# Patient Record
Sex: FEMALE | Race: WHITE | Employment: UNEMPLOYED | URBAN - NONMETROPOLITAN AREA
[De-identification: names, ages, dates, MRNs, and addresses within clinical notes are randomized per-mention and may not be internally consistent; named-entity substitution may affect disease eponyms.]

---

## 2017-01-05 ENCOUNTER — OFFICE VISIT (OUTPATIENT)
Dept: NEUROLOGY | Age: 67
End: 2017-01-05
Payer: COMMERCIAL

## 2017-01-05 VITALS
HEART RATE: 95 BPM | OXYGEN SATURATION: 92 % | DIASTOLIC BLOOD PRESSURE: 68 MMHG | BODY MASS INDEX: 27.82 KG/M2 | SYSTOLIC BLOOD PRESSURE: 111 MMHG | HEIGHT: 63 IN | WEIGHT: 157 LBS

## 2017-01-05 DIAGNOSIS — G61.0 GUILLAIN BARRÉ SYNDROME (HCC): Primary | ICD-10-CM

## 2017-01-05 DIAGNOSIS — M79.642 PAIN IN BOTH HANDS: ICD-10-CM

## 2017-01-05 DIAGNOSIS — R26.9 GAIT ABNORMALITY: ICD-10-CM

## 2017-01-05 DIAGNOSIS — R20.0 NUMBNESS: ICD-10-CM

## 2017-01-05 DIAGNOSIS — R53.1 WEAKNESS: ICD-10-CM

## 2017-01-05 DIAGNOSIS — M79.641 PAIN IN BOTH HANDS: ICD-10-CM

## 2017-01-05 PROCEDURE — 99214 OFFICE O/P EST MOD 30 MIN: CPT | Performed by: PSYCHIATRY & NEUROLOGY

## 2017-01-05 RX ORDER — HYDROCODONE BITARTRATE AND ACETAMINOPHEN 5; 325 MG/1; MG/1
1 TABLET ORAL EVERY 6 HOURS PRN
Qty: 60 TABLET | Refills: 0 | Status: SHIPPED | OUTPATIENT
Start: 2017-01-05 | End: 2017-02-04

## 2017-04-05 ENCOUNTER — OFFICE VISIT (OUTPATIENT)
Dept: NEUROLOGY | Age: 67
End: 2017-04-05
Payer: COMMERCIAL

## 2017-04-05 VITALS
SYSTOLIC BLOOD PRESSURE: 136 MMHG | DIASTOLIC BLOOD PRESSURE: 84 MMHG | RESPIRATION RATE: 16 BRPM | HEART RATE: 112 BPM | WEIGHT: 170 LBS | HEIGHT: 63 IN | BODY MASS INDEX: 30.12 KG/M2

## 2017-04-05 DIAGNOSIS — M79.641 PAIN IN BOTH HANDS: ICD-10-CM

## 2017-04-05 DIAGNOSIS — R20.0 NUMBNESS: ICD-10-CM

## 2017-04-05 DIAGNOSIS — G61.0 GUILLAIN BARRÉ SYNDROME (HCC): Primary | ICD-10-CM

## 2017-04-05 DIAGNOSIS — R53.1 WEAKNESS: ICD-10-CM

## 2017-04-05 DIAGNOSIS — M79.642 PAIN IN BOTH HANDS: ICD-10-CM

## 2017-04-05 DIAGNOSIS — R26.9 GAIT ABNORMALITY: ICD-10-CM

## 2017-04-05 PROCEDURE — 99214 OFFICE O/P EST MOD 30 MIN: CPT | Performed by: PSYCHIATRY & NEUROLOGY

## 2017-04-05 RX ORDER — GABAPENTIN 300 MG/1
300 CAPSULE ORAL 3 TIMES DAILY
Qty: 270 CAPSULE | Refills: 3 | Status: SHIPPED | OUTPATIENT
Start: 2017-04-05 | End: 2017-08-22

## 2017-06-28 ENCOUNTER — TELEPHONE (OUTPATIENT)
Dept: NEUROLOGY | Age: 67
End: 2017-06-28

## 2017-08-22 ENCOUNTER — OFFICE VISIT (OUTPATIENT)
Dept: NEUROLOGY | Age: 67
End: 2017-08-22
Payer: MEDICARE

## 2017-08-22 VITALS
HEIGHT: 63 IN | WEIGHT: 170 LBS | HEART RATE: 107 BPM | OXYGEN SATURATION: 92 % | SYSTOLIC BLOOD PRESSURE: 133 MMHG | DIASTOLIC BLOOD PRESSURE: 83 MMHG | BODY MASS INDEX: 30.12 KG/M2

## 2017-08-22 DIAGNOSIS — R26.9 GAIT ABNORMALITY: ICD-10-CM

## 2017-08-22 DIAGNOSIS — R20.0 NUMBNESS: ICD-10-CM

## 2017-08-22 DIAGNOSIS — M79.641 PAIN IN BOTH HANDS: ICD-10-CM

## 2017-08-22 DIAGNOSIS — R53.1 WEAKNESS: ICD-10-CM

## 2017-08-22 DIAGNOSIS — G61.0 GUILLAIN BARRÉ SYNDROME (HCC): Primary | ICD-10-CM

## 2017-08-22 DIAGNOSIS — M79.642 PAIN IN BOTH HANDS: ICD-10-CM

## 2017-08-22 PROCEDURE — 99214 OFFICE O/P EST MOD 30 MIN: CPT | Performed by: PSYCHIATRY & NEUROLOGY

## 2018-08-14 LAB
ALBUMIN SERPL-MCNC: 4.5 G/DL (ref 3.5–5.2)
ALP BLD-CCNC: 59 U/L (ref 35–104)
ALT SERPL-CCNC: 13 U/L (ref 5–33)
ANION GAP SERPL CALCULATED.3IONS-SCNC: 17 MMOL/L (ref 7–19)
AST SERPL-CCNC: 20 U/L (ref 5–32)
BASOPHILS ABSOLUTE: 0.1 K/UL (ref 0–0.2)
BASOPHILS RELATIVE PERCENT: 0.6 % (ref 0–1)
BILIRUB SERPL-MCNC: 0.4 MG/DL (ref 0.2–1.2)
BUN BLDV-MCNC: 15 MG/DL (ref 8–23)
CALCIUM SERPL-MCNC: 9.3 MG/DL (ref 8.8–10.2)
CHLORIDE BLD-SCNC: 99 MMOL/L (ref 98–111)
CHOLESTEROL, TOTAL: 231 MG/DL (ref 160–199)
CO2: 26 MMOL/L (ref 22–29)
CREAT SERPL-MCNC: <0.5 MG/DL (ref 0.5–0.9)
EOSINOPHILS ABSOLUTE: 0.2 K/UL (ref 0–0.6)
EOSINOPHILS RELATIVE PERCENT: 2.7 % (ref 0–5)
GFR NON-AFRICAN AMERICAN: >60
GLUCOSE BLD-MCNC: 100 MG/DL (ref 74–109)
HCT VFR BLD CALC: 42.2 % (ref 37–47)
HDLC SERPL-MCNC: 130 MG/DL (ref 65–121)
HEMOGLOBIN: 13.5 G/DL (ref 12–16)
LDL CHOLESTEROL CALCULATED: 90 MG/DL
LYMPHOCYTES ABSOLUTE: 1.7 K/UL (ref 1.1–4.5)
LYMPHOCYTES RELATIVE PERCENT: 18.5 % (ref 20–40)
MCH RBC QN AUTO: 31.8 PG (ref 27–31)
MCHC RBC AUTO-ENTMCNC: 32 G/DL (ref 33–37)
MCV RBC AUTO: 99.3 FL (ref 81–99)
MONOCYTES ABSOLUTE: 0.7 K/UL (ref 0–0.9)
MONOCYTES RELATIVE PERCENT: 7.3 % (ref 0–10)
NEUTROPHILS ABSOLUTE: 6.3 K/UL (ref 1.5–7.5)
NEUTROPHILS RELATIVE PERCENT: 70.5 % (ref 50–65)
PDW BLD-RTO: 14.1 % (ref 11.5–14.5)
PLATELET # BLD: 237 K/UL (ref 130–400)
PMV BLD AUTO: 10.8 FL (ref 9.4–12.3)
POTASSIUM SERPL-SCNC: 4.1 MMOL/L (ref 3.5–5)
RBC # BLD: 4.25 M/UL (ref 4.2–5.4)
SODIUM BLD-SCNC: 142 MMOL/L (ref 136–145)
TOTAL PROTEIN: 7.7 G/DL (ref 6.6–8.7)
TRIGL SERPL-MCNC: 55 MG/DL (ref 0–149)
TSH SERPL DL<=0.05 MIU/L-ACNC: 1.24 UIU/ML (ref 0.27–4.2)
WBC # BLD: 9 K/UL (ref 4.8–10.8)

## 2018-12-27 ENCOUNTER — OFFICE VISIT (OUTPATIENT)
Dept: PULMONOLOGY | Facility: CLINIC | Age: 68
End: 2018-12-27

## 2018-12-27 VITALS
WEIGHT: 177 LBS | RESPIRATION RATE: 16 BRPM | OXYGEN SATURATION: 94 % | SYSTOLIC BLOOD PRESSURE: 130 MMHG | HEIGHT: 63 IN | BODY MASS INDEX: 31.36 KG/M2 | DIASTOLIC BLOOD PRESSURE: 80 MMHG | HEART RATE: 76 BPM

## 2018-12-27 DIAGNOSIS — J44.9 COPD, VERY SEVERE (HCC): Primary | ICD-10-CM

## 2018-12-27 DIAGNOSIS — J84.10 PULMONARY FIBROSIS, UNSPECIFIED (HCC): ICD-10-CM

## 2018-12-27 DIAGNOSIS — J96.11 CHRONIC RESPIRATORY FAILURE WITH HYPOXIA (HCC): ICD-10-CM

## 2018-12-27 PROCEDURE — 99213 OFFICE O/P EST LOW 20 MIN: CPT | Performed by: INTERNAL MEDICINE

## 2018-12-27 RX ORDER — ACETAMINOPHEN 500 MG
500 TABLET ORAL EVERY 6 HOURS PRN
COMMUNITY

## 2018-12-27 RX ORDER — ALBUTEROL SULFATE 2.5 MG/3ML
2.5 SOLUTION RESPIRATORY (INHALATION)
Status: ON HOLD | COMMUNITY
End: 2019-07-05

## 2018-12-27 RX ORDER — BUDESONIDE AND FORMOTEROL FUMARATE DIHYDRATE 160; 4.5 UG/1; UG/1
2 AEROSOL RESPIRATORY (INHALATION)
COMMUNITY
End: 2019-12-27

## 2018-12-27 NOTE — PROGRESS NOTES
"Subjective   Joana Cuevas is a 68 y.o. female.     Chief Complaint   Patient presents with   • COPD      Pt with copd , pulmonary fibrosis, chronic respiratory failure with hypoxia, RBILD.  FEV1 32% predicted 2017 with normal dlco.  She has had prior admission with severe acute respiratory failure.  Hypoxic response to exercise with tachycardia on 6MWT.  Prior hx interstitial and nodular infiltrates 2016.    History of Present Illness   She is using the oxygen around the clock except when by open flame.  She is benefiting from it.  No exacerbations, no acute complaints.  She has mild intermittent dyspnea in chest on exertion.    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   has a past surgical history that includes Hysterectomy; Mastectomy; and Tonsilectomy, adenoidectomy, bilateral myringotomy and tubes.  family history includes Cancer in her mother; Diabetes in her mother; Heart failure in her father.   reports that she quit smoking about 3 years ago. Her smoking use included cigarettes. She has a 70.00 pack-year smoking history. she has never used smokeless tobacco.  No Known Allergies  Medications    Current Outpatient Medications:   •  acetaminophen (TYLENOL) 500 MG tablet, Take 500 mg by mouth., Disp: , Rfl:   •  albuterol (PROVENTIL) (2.5 MG/3ML) 0.083% nebulizer solution, 2.5 mg., Disp: , Rfl:   •  budesonide-formoterol (SYMBICORT) 160-4.5 MCG/ACT inhaler, Inhale., Disp: , Rfl:   •  O2 (OXYGEN), Inhale., Disp: , Rfl:   •  tiotropium (SPIRIVA) 18 MCG per inhalation capsule, Place  into inhaler and inhale., Disp: , Rfl:     Review of Systems   Constitutional: Negative for chills and fever.   Cardiovascular: Negative for chest pain.   Gastrointestinal: Negative for nausea and vomiting.     ------------------------------------  Objective   /80   Pulse 76   Resp 16   Ht 160 cm (63\")   Wt " 80.3 kg (177 lb)   SpO2 94% Comment: pd of 3  Breastfeeding? No   BMI 31.35 kg/m²   Physical Exam   Constitutional: She appears well-developed. She does not appear ill. No distress.   HENT:   Head: Atraumatic.   Nose: Nose normal.   Eyes: Conjunctivae and EOM are normal. No scleral icterus.   Neck: Neck supple.   Cardiovascular: Normal rate, regular rhythm, S1 normal and S2 normal.   Pulmonary/Chest: Effort normal and breath sounds normal. No stridor.   Abdominal: Soft.   Musculoskeletal: She exhibits no deformity.   Lymphadenopathy:     She has no cervical adenopathy.   Neurological: She is alert.   Skin: Skin is warm. No rash noted.   Psychiatric: She has a normal mood and affect.         ------------------------------------  Assessment/Plan   Joana was seen today for copd.    Diagnoses and all orders for this visit:    COPD, very severe (CMS/HCC)    Chronic respiratory failure with hypoxia (CMS/HCC)    Pulmonary fibrosis, unspecified (CMS/HCC)  -     XR Chest 2 View; Future      Patient's Body mass index is 31.35 kg/m². BMI is above normal parameters. Recommendations include: referral to primary care.      Pulmonary status stable with copd/chronic RF compensated and pulm fibrosis probably postinflammatory.  No changes in regimen today.

## 2019-01-21 ENCOUNTER — HOSPITAL ENCOUNTER (OUTPATIENT)
Dept: GENERAL RADIOLOGY | Facility: HOSPITAL | Age: 69
Discharge: HOME | End: 2019-01-21
Attending: INTERNAL MEDICINE | Admitting: INTERNAL MEDICINE

## 2019-01-21 DIAGNOSIS — J84.10 PULMONARY FIBROSIS, UNSPECIFIED (HCC): ICD-10-CM

## 2019-01-21 PROCEDURE — 71046 X-RAY EXAM CHEST 2 VIEWS: CPT

## 2019-01-22 DIAGNOSIS — J84.10 PULMONARY FIBROSIS (HCC): Primary | ICD-10-CM

## 2019-02-07 ENCOUNTER — TELEPHONE (OUTPATIENT)
Dept: PULMONOLOGY | Facility: CLINIC | Age: 69
End: 2019-02-07

## 2019-02-07 RX ORDER — CEFDINIR 300 MG/1
300 CAPSULE ORAL 2 TIMES DAILY
Qty: 14 CAPSULE | Refills: 0 | Status: SHIPPED | OUTPATIENT
Start: 2019-02-07 | End: 2019-02-14

## 2019-02-07 NOTE — TELEPHONE ENCOUNTER
Patient called to say she has started coughing up dark green sputum, chest congestion, no fever.  Patient is supposed to have a repeat cxr on 2/22/19.

## 2019-03-01 ENCOUNTER — HOSPITAL ENCOUNTER (OUTPATIENT)
Dept: GENERAL RADIOLOGY | Facility: HOSPITAL | Age: 69
Discharge: HOME | End: 2019-03-01
Admitting: INTERNAL MEDICINE

## 2019-03-01 DIAGNOSIS — R91.8 LUNG INFILTRATE: Primary | ICD-10-CM

## 2019-03-01 DIAGNOSIS — J84.10 PULMONARY FIBROSIS (HCC): ICD-10-CM

## 2019-03-01 PROCEDURE — 71046 X-RAY EXAM CHEST 2 VIEWS: CPT

## 2019-03-07 ENCOUNTER — APPOINTMENT (OUTPATIENT)
Dept: CT IMAGING | Facility: HOSPITAL | Age: 69
End: 2019-03-07

## 2019-03-08 ENCOUNTER — HOSPITAL ENCOUNTER (OUTPATIENT)
Dept: NEUROLOGY | Age: 69
Discharge: HOME | End: 2019-03-08
Payer: MEDICARE

## 2019-03-08 PROCEDURE — 95911 NRV CNDJ TEST 9-10 STUDIES: CPT | Performed by: PSYCHIATRY & NEUROLOGY

## 2019-03-08 PROCEDURE — 95886 MUSC TEST DONE W/N TEST COMP: CPT

## 2019-03-08 PROCEDURE — 95911 NRV CNDJ TEST 9-10 STUDIES: CPT

## 2019-03-08 PROCEDURE — 95886 MUSC TEST DONE W/N TEST COMP: CPT | Performed by: PSYCHIATRY & NEUROLOGY

## 2019-03-12 ENCOUNTER — HOSPITAL ENCOUNTER (OUTPATIENT)
Dept: CT IMAGING | Facility: HOSPITAL | Age: 69
Discharge: HOME | End: 2019-03-12
Admitting: INTERNAL MEDICINE

## 2019-03-12 DIAGNOSIS — R91.8 LUNG INFILTRATE: ICD-10-CM

## 2019-03-12 PROCEDURE — 71250 CT THORAX DX C-: CPT

## 2019-03-13 ENCOUNTER — TELEPHONE (OUTPATIENT)
Dept: PULMONOLOGY | Facility: CLINIC | Age: 69
End: 2019-03-13

## 2019-03-13 NOTE — TELEPHONE ENCOUNTER
Ar Kendrick MD  [facility]             Please call the patient regarding her abnormal result.  There is a nodule that we will need to recheck with a follow up ct in a few months.  Have her keep follow up and we will discuss further.

## 2019-03-13 NOTE — TELEPHONE ENCOUNTER
Called Daughter (Dex) of the results.    Daughter says that there is residual pneumonia and is still coughing up mucus that is dark green.    She is wanting to know if she needs antibiotics or steroids.

## 2019-03-14 DIAGNOSIS — R05.9 COUGH: Primary | ICD-10-CM

## 2019-03-14 RX ORDER — CEFDINIR 300 MG/1
300 CAPSULE ORAL 2 TIMES DAILY
Qty: 14 CAPSULE | Refills: 0 | Status: SHIPPED | OUTPATIENT
Start: 2019-03-14 | End: 2019-03-21

## 2019-04-02 ENCOUNTER — APPOINTMENT (OUTPATIENT)
Dept: CT IMAGING | Facility: HOSPITAL | Age: 69
End: 2019-04-02

## 2019-04-10 PROBLEM — J44.9 STAGE 4 VERY SEVERE COPD BY GOLD CLASSIFICATION: Status: ACTIVE | Noted: 2019-04-10

## 2019-04-17 ENCOUNTER — OFFICE VISIT (OUTPATIENT)
Dept: PULMONOLOGY | Facility: CLINIC | Age: 69
End: 2019-04-17

## 2019-04-17 VITALS
HEIGHT: 63 IN | DIASTOLIC BLOOD PRESSURE: 90 MMHG | SYSTOLIC BLOOD PRESSURE: 140 MMHG | OXYGEN SATURATION: 97 % | BODY MASS INDEX: 31.54 KG/M2 | HEART RATE: 109 BPM | WEIGHT: 178 LBS

## 2019-04-17 DIAGNOSIS — J44.9 STAGE 4 VERY SEVERE COPD BY GOLD CLASSIFICATION (HCC): Primary | ICD-10-CM

## 2019-04-17 DIAGNOSIS — R91.1 LUNG NODULE, SOLITARY: ICD-10-CM

## 2019-04-17 DIAGNOSIS — R05.9 COUGH: ICD-10-CM

## 2019-04-17 DIAGNOSIS — J84.10 PULMONARY FIBROSIS (HCC): ICD-10-CM

## 2019-04-17 DIAGNOSIS — J96.11 CHRONIC RESPIRATORY FAILURE WITH HYPOXIA (HCC): ICD-10-CM

## 2019-04-17 DIAGNOSIS — J84.115 RESPIRATORY BRONCHIOLITIS INTERSTITIAL LUNG DISEASE (HCC): ICD-10-CM

## 2019-04-17 LAB
DIFF CAP.CO: NORMAL ML/MMHG SEC
FEV1/FVC: NORMAL %
FEV1: NORMAL LITERS
FVC VOL RESPIRATORY: NORMAL LITERS

## 2019-04-17 PROCEDURE — 94010 BREATHING CAPACITY TEST: CPT | Performed by: INTERNAL MEDICINE

## 2019-04-17 PROCEDURE — 94729 DIFFUSING CAPACITY: CPT | Performed by: INTERNAL MEDICINE

## 2019-04-17 PROCEDURE — 99214 OFFICE O/P EST MOD 30 MIN: CPT | Performed by: INTERNAL MEDICINE

## 2019-04-17 RX ORDER — GLUCOSAMINE/D3/BOSWELLIA SERRA 1500MG-400
1000 TABLET ORAL DAILY
COMMUNITY

## 2019-04-17 NOTE — PROGRESS NOTES
Subjective   Joana Cuevas is a 68 y.o. female.     Background: Pt with copd , pulmonary fibrosis, chronic respiratory failure with hypoxia, RBILD.  FEV1 32% predicted 2017 with normal dlco.  She has had prior admission with severe acute respiratory failure.  Hypoxic response to exercise with tachycardia on 6MWT.  Prior hx interstitial and nodular infiltrates 2016    Chief Complaint   Patient presents with   • Shortness of Breath        History of Present Illness   She has chronic shortness of breath in chest on exertion alleviated by oxygen and alleviated with rest, associated with intermittent aggravating cough.  She has hx guillain barre syndrome and cant take flu shots.    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   has a past surgical history that includes Hysterectomy; Mastectomy; and Tonsilectomy, adenoidectomy, bilateral myringotomy and tubes.  family history includes Cancer in her mother; Diabetes in her mother; Heart failure in her father.   reports that she quit smoking about 4 years ago. Her smoking use included cigarettes. She has a 70.00 pack-year smoking history. She has never used smokeless tobacco. She reports that she does not use drugs.  No Known Allergies  Medications    Current Outpatient Medications:   •  acetaminophen (TYLENOL) 500 MG tablet, Take 500 mg by mouth., Disp: , Rfl:   •  albuterol (PROVENTIL) (2.5 MG/3ML) 0.083% nebulizer solution, 2.5 mg., Disp: , Rfl:   •  Biotin 16002 MCG tablet, Take  by mouth., Disp: , Rfl:   •  budesonide-formoterol (SYMBICORT) 160-4.5 MCG/ACT inhaler, Inhale., Disp: , Rfl:   •  MAGNESIUM PO, Take  by mouth., Disp: , Rfl:   •  O2 (OXYGEN), Inhale., Disp: , Rfl:   •  tiotropium (SPIRIVA) 18 MCG per inhalation capsule, Place  into inhaler and inhale., Disp: , Rfl:     Review of Systems   Constitutional: Negative for chills and fever.  "  Respiratory: Positive for shortness of breath. Negative for cough.    Cardiovascular: Negative for chest pain.   Gastrointestinal: Negative for nausea and vomiting.   Neurological: Positive for weakness.       Objective   /90   Pulse 109   Ht 160 cm (63\")   Wt 80.7 kg (178 lb)   SpO2 97% Comment: 3l  Breastfeeding? No   BMI 31.53 kg/m²   Physical Exam   Constitutional: She appears well-developed. She does not appear ill. No distress.   HENT:   Head: Atraumatic.   Nose: Nose normal.   Eyes: Conjunctivae and EOM are normal. No scleral icterus.   Neck: Neck supple.   Cardiovascular: Normal rate, regular rhythm, S1 normal and S2 normal.   Pulmonary/Chest: Effort normal. She has wheezes. She has rales.   Abdominal: Soft. She exhibits no distension. There is no tenderness.   Musculoskeletal: She exhibits no deformity.   Lymphadenopathy:     She has no cervical adenopathy.   Neurological: She is alert.   Skin: Skin is warm. No rash noted.   Psychiatric: She has a normal mood and affect.     -----------------------------------------------------------------------------------------------  Recent Imaging:  EXAMINATION: CT CHEST WO CONTRAST- 3/12/2019 3:29 PM CDT  HISTORY: Follow up infiltrate on cxr.; R91.8-Other nonspecific abnormal  finding of lung field. Remote history of breast cancer.  DOSE: 260 mGycm (Automatic exposure control technique was implemented in  an effort to keep the radiation dose as low as possible without  compromising image quality)  REPORT: Spiral CT of the chest was performed without intravenous  contrast from the thoracic inlet through the upper abdomen.  Reconstructed coronal and sagittal images are also reviewed.  Comparison: CT chest without contrast 12/13/2016.  Review of lung windows demonstrates hyperinflation of lungs compatible  with moderate diffuse centrilobular emphysema. There are coarse  peripheral predominantly reticular interstitial infiltrates in the left  lower lobe and " lingula that may represent residual pneumonia but no lung  masses identified. In the deep posterior costophrenic sulcus on image  129 there is a 9.8 mm pleural-based nodule which is noncalcified which  needs follow-up. There is mild interstitial thickening within the  anterior aspect of the right upper lobe, without consolidation. This  could be acute or chronic. No pneumothorax or pleural effusion is  identified. The airways are patent. Review of soft tissue windows  demonstrate some low-attenuation nodules in the thyroid gland, greater  on the right. One nodule right measures 1.4 cm and another measures  approximately 1.7 cm. Ultrasound of the thyroid gland is recommended.  There is evidence of previous bilateral mastectomies and axillary lymph  node dissection. No intrathoracic lymphadenopathy is identified. Heavy  coronary artery calcification is noted at the proximal LAD. Heart size  is normal. In the upper abdomen, there is increased attenuation  dependently within the gallbladder wall related to cholelithiasis or  sludge. No inflammation of the gallbladder is visualized. Review of bone  windows shows no acute findings.  IMPRESSION:  1. No lung mass is identified, there are coarse reticular interstitial  infiltrates in the left lower lobe and lingula which may represent  residual pneumonia. Some degree of interstitial fibrosis is also likely.  Follow-up is recommended for a 9.8 mm pleural-based nodule in the deep  left posterior costophrenic sulcus which is not calcified. Consider  repeat chest CT in 3 months.  2. No evidence of intrathoracic lymphadenopathy.   3. Previous bilateral mastectomies and axillary lymph node dissection.  4. Hyperattenuating dependent density in the gallbladder possibly  cholelithiasis or sludge.  5. 2 low-attenuation nodules in the right thyroid lobe measuring greater  than 1 cm for which thyroid ultrasound is recommended for  follow-up.  -----------------------------------------------------------------------------------------------  Pulmonary Functions Testing Results:  FEV1   Date Value Ref Range Status   04/17/2019 36% liters Final     FVC   Date Value Ref Range Status   04/17/2019 68% liters Final     FEV1/FVC   Date Value Ref Range Status   04/17/2019 42.15% % Final     DLCO   Date Value Ref Range Status   04/17/2019 42% ml/mmHg sec Final      My interpretation of PFT: severe airflow obstruction with reduced   -----------------------------------------------------------------------------------------------  Assessment/Plan   Problem List Items Addressed This Visit        Respiratory    Stage 4 very severe COPD by GOLD classification (CMS/[facility]) - Primary    Relevant Orders    Pulmonary Function Test (Completed)    Respiratory bronchiolitis interstitial lung disease (CMS/[facility])    Chronic respiratory failure with hypoxia (CMS/[facility])    Pulmonary fibrosis (CMS/[facility])    Cough      Other Visit Diagnoses     Lung nodule, solitary        Relevant Orders    CT Chest Without Contrast        Patient's Body mass index is 31.53 kg/m². BMI is above normal parameters. Recommendations include: referral to primary care.      She has had pneumonia and also has a lung nodule that needs follow up.  She is not a candidate for vaccines.  pft appears stable.  Cough is generally better since smoking cessation, so rbild appears to be resolved.  Repeat ct in June.       Electronically signed by Ar Kendrick MD, 4/17/2019, 11:03 AM

## 2019-05-15 RX ORDER — TIOTROPIUM BROMIDE 18 UG/1
CAPSULE ORAL; RESPIRATORY (INHALATION)
Qty: 90 CAPSULE | Refills: 3 | Status: ON HOLD | OUTPATIENT
Start: 2019-05-15 | End: 2019-07-05

## 2019-07-05 ENCOUNTER — APPOINTMENT (OUTPATIENT)
Dept: MRI IMAGING | Facility: HOSPITAL | Age: 69
End: 2019-07-05

## 2019-07-05 ENCOUNTER — HOSPITAL ENCOUNTER (OUTPATIENT)
Facility: HOSPITAL | Age: 69
Setting detail: OBSERVATION
Discharge: HOME | End: 2019-07-06
Attending: EMERGENCY MEDICINE | Admitting: INTERNAL MEDICINE

## 2019-07-05 ENCOUNTER — APPOINTMENT (OUTPATIENT)
Dept: CARDIOLOGY | Facility: HOSPITAL | Age: 69
End: 2019-07-05

## 2019-07-05 ENCOUNTER — APPOINTMENT (OUTPATIENT)
Dept: CT IMAGING | Facility: HOSPITAL | Age: 69
End: 2019-07-05

## 2019-07-05 ENCOUNTER — APPOINTMENT (OUTPATIENT)
Dept: ULTRASOUND IMAGING | Facility: HOSPITAL | Age: 69
End: 2019-07-05

## 2019-07-05 DIAGNOSIS — S01.01XA LACERATION OF SCALP, INITIAL ENCOUNTER: ICD-10-CM

## 2019-07-05 DIAGNOSIS — R55 SYNCOPE, UNSPECIFIED SYNCOPE TYPE: Primary | ICD-10-CM

## 2019-07-05 PROBLEM — S01.91XA LACERATION OF HEAD: Status: ACTIVE | Noted: 2019-07-05

## 2019-07-05 LAB
ABO GROUP BLD: NORMAL
ALBUMIN SERPL-MCNC: 4.3 G/DL (ref 3.5–5)
ALBUMIN/GLOB SERPL: 1.4 G/DL (ref 1.1–2.5)
ALP SERPL-CCNC: 46 U/L (ref 24–120)
ALT SERPL W P-5'-P-CCNC: 20 U/L (ref 0–54)
ANION GAP SERPL CALCULATED.3IONS-SCNC: 12 MMOL/L (ref 4–13)
APTT PPP: 30 SECONDS (ref 24.1–35)
ARTICHOKE IGE QN: 64 MG/DL (ref 0–99)
AST SERPL-CCNC: 49 U/L (ref 7–45)
BASOPHILS # BLD AUTO: 0.08 10*3/MM3 (ref 0–0.2)
BASOPHILS NFR BLD AUTO: 0.8 % (ref 0–2)
BH CV ECHO MEAS - AO MAX PG (FULL): 4.1 MMHG
BH CV ECHO MEAS - AO MAX PG: 8.6 MMHG
BH CV ECHO MEAS - AO MEAN PG (FULL): 1 MMHG
BH CV ECHO MEAS - AO MEAN PG: 3 MMHG
BH CV ECHO MEAS - AO ROOT AREA (BSA CORRECTED): 1.7
BH CV ECHO MEAS - AO ROOT AREA: 7.1 CM^2
BH CV ECHO MEAS - AO ROOT DIAM: 3 CM
BH CV ECHO MEAS - AO V2 MAX: 147 CM/SEC
BH CV ECHO MEAS - AO V2 MEAN: 73.7 CM/SEC
BH CV ECHO MEAS - AO V2 VTI: 18.5 CM
BH CV ECHO MEAS - AVA(I,A): 2.9 CM^2
BH CV ECHO MEAS - AVA(I,D): 2.9 CM^2
BH CV ECHO MEAS - AVA(V,A): 2.3 CM^2
BH CV ECHO MEAS - AVA(V,D): 2.3 CM^2
BH CV ECHO MEAS - BSA(HAYCOCK): 1.9 M^2
BH CV ECHO MEAS - BSA: 1.8 M^2
BH CV ECHO MEAS - BZI_BMI: 30.3 KILOGRAMS/M^2
BH CV ECHO MEAS - BZI_METRIC_HEIGHT: 160 CM
BH CV ECHO MEAS - BZI_METRIC_WEIGHT: 77.6 KG
BH CV ECHO MEAS - EDV(CUBED): 89.3 ML
BH CV ECHO MEAS - EDV(MOD-SP4): 104 ML
BH CV ECHO MEAS - EDV(TEICH): 91 ML
BH CV ECHO MEAS - EF(CUBED): 63 %
BH CV ECHO MEAS - EF(MOD-SP4): 51.8 %
BH CV ECHO MEAS - EF(TEICH): 54.6 %
BH CV ECHO MEAS - ESV(CUBED): 33.1 ML
BH CV ECHO MEAS - ESV(MOD-SP4): 50.1 ML
BH CV ECHO MEAS - ESV(TEICH): 41.3 ML
BH CV ECHO MEAS - FS: 28.2 %
BH CV ECHO MEAS - IVS/LVPW: 0.94
BH CV ECHO MEAS - IVSD: 0.75 CM
BH CV ECHO MEAS - LA DIMENSION: 2.9 CM
BH CV ECHO MEAS - LA/AO: 0.97
BH CV ECHO MEAS - LAT PEAK E' VEL: 7.7 CM/SEC
BH CV ECHO MEAS - LV DIASTOLIC VOL/BSA (35-75): 57.5 ML/M^2
BH CV ECHO MEAS - LV MASS(C)D: 106.7 GRAMS
BH CV ECHO MEAS - LV MASS(C)DI: 59 GRAMS/M^2
BH CV ECHO MEAS - LV MAX PG: 4.6 MMHG
BH CV ECHO MEAS - LV MEAN PG: 2 MMHG
BH CV ECHO MEAS - LV SYSTOLIC VOL/BSA (12-30): 27.7 ML/M^2
BH CV ECHO MEAS - LV V1 MAX: 107 CM/SEC
BH CV ECHO MEAS - LV V1 MEAN: 64.4 CM/SEC
BH CV ECHO MEAS - LV V1 VTI: 16.9 CM
BH CV ECHO MEAS - LVIDD: 4.5 CM
BH CV ECHO MEAS - LVIDS: 3.2 CM
BH CV ECHO MEAS - LVLD AP4: 8 CM
BH CV ECHO MEAS - LVLS AP4: 6.7 CM
BH CV ECHO MEAS - LVOT AREA (M): 3.1 CM^2
BH CV ECHO MEAS - LVOT AREA: 3.1 CM^2
BH CV ECHO MEAS - LVOT DIAM: 2 CM
BH CV ECHO MEAS - LVPWD: 0.79 CM
BH CV ECHO MEAS - MED PEAK E' VEL: 8.92 CM/SEC
BH CV ECHO MEAS - MV DEC TIME: 0.19 SEC
BH CV ECHO MEAS - MV E MAX VEL: 63 CM/SEC
BH CV ECHO MEAS - RAP SYSTOLE: 5 MMHG
BH CV ECHO MEAS - RVSP: 10.5 MMHG
BH CV ECHO MEAS - SI(AO): 72.3 ML/M^2
BH CV ECHO MEAS - SI(CUBED): 31.1 ML/M^2
BH CV ECHO MEAS - SI(LVOT): 29.3 ML/M^2
BH CV ECHO MEAS - SI(MOD-SP4): 29.8 ML/M^2
BH CV ECHO MEAS - SI(TEICH): 27.5 ML/M^2
BH CV ECHO MEAS - SV(AO): 130.8 ML
BH CV ECHO MEAS - SV(CUBED): 56.2 ML
BH CV ECHO MEAS - SV(LVOT): 53.1 ML
BH CV ECHO MEAS - SV(MOD-SP4): 53.9 ML
BH CV ECHO MEAS - SV(TEICH): 49.7 ML
BH CV ECHO MEAS - TR MAX VEL: 117 CM/SEC
BH CV ECHO MEASUREMENTS AVERAGE E/E' RATIO: 7.58
BILIRUB SERPL-MCNC: 0.4 MG/DL (ref 0.1–1)
BLD GP AB SCN SERPL QL: NEGATIVE
BUN BLD-MCNC: 8 MG/DL (ref 5–21)
BUN/CREAT SERPL: 21.6 (ref 7–25)
CALCIUM SPEC-SCNC: 8.9 MG/DL (ref 8.4–10.4)
CHLORIDE SERPL-SCNC: 94 MMOL/L (ref 98–110)
CHOLEST SERPL-MCNC: 172 MG/DL (ref 130–200)
CO2 SERPL-SCNC: 30 MMOL/L (ref 24–31)
CREAT BLD-MCNC: 0.37 MG/DL (ref 0.5–1.4)
CRP SERPL-MCNC: 0.51 MG/DL (ref 0–0.99)
DEPRECATED RDW RBC AUTO: 48.5 FL (ref 40–54)
EOSINOPHIL # BLD AUTO: 0.57 10*3/MM3 (ref 0–0.7)
EOSINOPHIL NFR BLD AUTO: 5.9 % (ref 0–4)
ERYTHROCYTE [DISTWIDTH] IN BLOOD BY AUTOMATED COUNT: 13.4 % (ref 12–15)
ERYTHROCYTE [SEDIMENTATION RATE] IN BLOOD: 11 MM/HR (ref 0–20)
FOLATE SERPL-MCNC: 9.9 NG/ML (ref 4.78–24.2)
GFR SERPL CREATININE-BSD FRML MDRD: >150 ML/MIN/1.73
GLOBULIN UR ELPH-MCNC: 3.1 GM/DL
GLUCOSE BLD-MCNC: 116 MG/DL (ref 70–100)
HCT VFR BLD AUTO: 37.1 % (ref 37–47)
HDLC SERPL-MCNC: 93 MG/DL
HGB BLD-MCNC: 12.4 G/DL (ref 12–16)
HOLD SPECIMEN: NORMAL
HOLD SPECIMEN: NORMAL
IMM GRANULOCYTES # BLD AUTO: 0.02 10*3/MM3 (ref 0–0.05)
IMM GRANULOCYTES NFR BLD AUTO: 0.2 % (ref 0–5)
INR PPP: 0.86 (ref 0.91–1.09)
LDLC/HDLC SERPL: 0.68 {RATIO}
LEFT ATRIUM VOLUME INDEX: 16.9 ML/M2
LEFT ATRIUM VOLUME: 30.6 CM3
LV EF 2D ECHO EST: 55 %
LYMPHOCYTES # BLD AUTO: 2.52 10*3/MM3 (ref 0.72–4.86)
LYMPHOCYTES NFR BLD AUTO: 26.3 % (ref 15–45)
MAXIMAL PREDICTED HEART RATE: 154 BPM
MCH RBC QN AUTO: 32.5 PG (ref 28–32)
MCHC RBC AUTO-ENTMCNC: 33.4 G/DL (ref 33–36)
MCV RBC AUTO: 97.1 FL (ref 82–98)
MONOCYTES # BLD AUTO: 0.85 10*3/MM3 (ref 0.19–1.3)
MONOCYTES NFR BLD AUTO: 8.9 % (ref 4–12)
NEUTROPHILS # BLD AUTO: 5.54 10*3/MM3 (ref 1.87–8.4)
NEUTROPHILS NFR BLD AUTO: 57.9 % (ref 39–78)
NRBC BLD AUTO-RTO: 0 /100 WBC (ref 0–0.2)
NT-PROBNP SERPL-MCNC: 172 PG/ML (ref 0–900)
PLATELET # BLD AUTO: 254 10*3/MM3 (ref 130–400)
PMV BLD AUTO: 10.5 FL (ref 6–12)
POTASSIUM BLD-SCNC: 4.3 MMOL/L (ref 3.5–5.3)
PROT SERPL-MCNC: 7.4 G/DL (ref 6.3–8.7)
PROTHROMBIN TIME: 12 SECONDS (ref 11.9–14.6)
RBC # BLD AUTO: 3.82 10*6/MM3 (ref 4.2–5.4)
RH BLD: POSITIVE
SODIUM BLD-SCNC: 136 MMOL/L (ref 135–145)
STRESS TARGET HR: 131 BPM
T&S EXPIRATION DATE: NORMAL
TRIGL SERPL-MCNC: 80 MG/DL (ref 0–149)
TROPONIN I SERPL-MCNC: <0.012 NG/ML (ref 0–0.03)
VIT B12 BLD-MCNC: 576 PG/ML (ref 239–931)
WBC NRBC COR # BLD: 9.58 10*3/MM3 (ref 4.8–10.8)
WHOLE BLOOD HOLD SPECIMEN: NORMAL
WHOLE BLOOD HOLD SPECIMEN: NORMAL

## 2019-07-05 PROCEDURE — 71260 CT THORAX DX C+: CPT

## 2019-07-05 PROCEDURE — 25010000002 ONDANSETRON PER 1 MG: Performed by: INTERNAL MEDICINE

## 2019-07-05 PROCEDURE — 94799 UNLISTED PULMONARY SVC/PX: CPT

## 2019-07-05 PROCEDURE — G0378 HOSPITAL OBSERVATION PER HR: HCPCS

## 2019-07-05 PROCEDURE — 25010000002 PERFLUTREN 6.52 MG/ML SUSPENSION: Performed by: INTERNAL MEDICINE

## 2019-07-05 PROCEDURE — 72125 CT NECK SPINE W/O DYE: CPT

## 2019-07-05 PROCEDURE — 93005 ELECTROCARDIOGRAM TRACING: CPT | Performed by: INTERNAL MEDICINE

## 2019-07-05 PROCEDURE — 86901 BLOOD TYPING SEROLOGIC RH(D): CPT | Performed by: EMERGENCY MEDICINE

## 2019-07-05 PROCEDURE — 99214 OFFICE O/P EST MOD 30 MIN: CPT | Performed by: INTERNAL MEDICINE

## 2019-07-05 PROCEDURE — 36415 COLL VENOUS BLD VENIPUNCTURE: CPT

## 2019-07-05 PROCEDURE — 93880 EXTRACRANIAL BILAT STUDY: CPT

## 2019-07-05 PROCEDURE — 93010 ELECTROCARDIOGRAM REPORT: CPT | Performed by: INTERNAL MEDICINE

## 2019-07-05 PROCEDURE — 93880 EXTRACRANIAL BILAT STUDY: CPT | Performed by: SURGERY

## 2019-07-05 PROCEDURE — 85025 COMPLETE CBC W/AUTO DIFF WBC: CPT | Performed by: EMERGENCY MEDICINE

## 2019-07-05 PROCEDURE — 70450 CT HEAD/BRAIN W/O DYE: CPT

## 2019-07-05 PROCEDURE — 86900 BLOOD TYPING SEROLOGIC ABO: CPT | Performed by: EMERGENCY MEDICINE

## 2019-07-05 PROCEDURE — 85651 RBC SED RATE NONAUTOMATED: CPT | Performed by: INTERNAL MEDICINE

## 2019-07-05 PROCEDURE — 86140 C-REACTIVE PROTEIN: CPT | Performed by: INTERNAL MEDICINE

## 2019-07-05 PROCEDURE — 80053 COMPREHEN METABOLIC PANEL: CPT | Performed by: EMERGENCY MEDICINE

## 2019-07-05 PROCEDURE — 93005 ELECTROCARDIOGRAM TRACING: CPT | Performed by: EMERGENCY MEDICINE

## 2019-07-05 PROCEDURE — 85730 THROMBOPLASTIN TIME PARTIAL: CPT | Performed by: EMERGENCY MEDICINE

## 2019-07-05 PROCEDURE — 72128 CT CHEST SPINE W/O DYE: CPT

## 2019-07-05 PROCEDURE — 86850 RBC ANTIBODY SCREEN: CPT | Performed by: EMERGENCY MEDICINE

## 2019-07-05 PROCEDURE — 84484 ASSAY OF TROPONIN QUANT: CPT | Performed by: EMERGENCY MEDICINE

## 2019-07-05 PROCEDURE — 96375 TX/PRO/DX INJ NEW DRUG ADDON: CPT

## 2019-07-05 PROCEDURE — 25010000002 HYDROMORPHONE PER 4 MG: Performed by: INTERNAL MEDICINE

## 2019-07-05 PROCEDURE — 99285 EMERGENCY DEPT VISIT HI MDM: CPT

## 2019-07-05 PROCEDURE — 84484 ASSAY OF TROPONIN QUANT: CPT | Performed by: INTERNAL MEDICINE

## 2019-07-05 PROCEDURE — 80061 LIPID PANEL: CPT | Performed by: INTERNAL MEDICINE

## 2019-07-05 PROCEDURE — 93306 TTE W/DOPPLER COMPLETE: CPT | Performed by: INTERNAL MEDICINE

## 2019-07-05 PROCEDURE — 82746 ASSAY OF FOLIC ACID SERUM: CPT | Performed by: INTERNAL MEDICINE

## 2019-07-05 PROCEDURE — 83880 ASSAY OF NATRIURETIC PEPTIDE: CPT | Performed by: EMERGENCY MEDICINE

## 2019-07-05 PROCEDURE — 93306 TTE W/DOPPLER COMPLETE: CPT

## 2019-07-05 PROCEDURE — 70551 MRI BRAIN STEM W/O DYE: CPT

## 2019-07-05 PROCEDURE — 85610 PROTHROMBIN TIME: CPT | Performed by: EMERGENCY MEDICINE

## 2019-07-05 PROCEDURE — 72131 CT LUMBAR SPINE W/O DYE: CPT

## 2019-07-05 PROCEDURE — 93005 ELECTROCARDIOGRAM TRACING: CPT

## 2019-07-05 PROCEDURE — 25010000002 IOPAMIDOL 61 % SOLUTION: Performed by: EMERGENCY MEDICINE

## 2019-07-05 PROCEDURE — 82607 VITAMIN B-12: CPT | Performed by: INTERNAL MEDICINE

## 2019-07-05 PROCEDURE — 94760 N-INVAS EAR/PLS OXIMETRY 1: CPT

## 2019-07-05 RX ORDER — NALOXONE HCL 0.4 MG/ML
0.4 VIAL (ML) INJECTION
Status: DISCONTINUED | OUTPATIENT
Start: 2019-07-05 | End: 2019-07-06 | Stop reason: HOSPADM

## 2019-07-05 RX ORDER — ONDANSETRON 4 MG/1
4 TABLET, FILM COATED ORAL EVERY 6 HOURS PRN
Status: DISCONTINUED | OUTPATIENT
Start: 2019-07-05 | End: 2019-07-06 | Stop reason: HOSPADM

## 2019-07-05 RX ORDER — IPRATROPIUM BROMIDE AND ALBUTEROL SULFATE 2.5; .5 MG/3ML; MG/3ML
3 SOLUTION RESPIRATORY (INHALATION)
Status: DISCONTINUED | OUTPATIENT
Start: 2019-07-05 | End: 2019-07-06 | Stop reason: HOSPADM

## 2019-07-05 RX ORDER — ALBUTEROL SULFATE 90 UG/1
2 AEROSOL, METERED RESPIRATORY (INHALATION) EVERY 4 HOURS PRN
COMMUNITY
End: 2019-12-27

## 2019-07-05 RX ORDER — BUDESONIDE AND FORMOTEROL FUMARATE DIHYDRATE 160; 4.5 UG/1; UG/1
2 AEROSOL RESPIRATORY (INHALATION)
Status: DISCONTINUED | OUTPATIENT
Start: 2019-07-05 | End: 2019-07-06 | Stop reason: HOSPADM

## 2019-07-05 RX ORDER — LIDOCAINE HYDROCHLORIDE AND EPINEPHRINE BITARTRATE 20; .01 MG/ML; MG/ML
INJECTION, SOLUTION SUBCUTANEOUS
Status: COMPLETED
Start: 2019-07-05 | End: 2019-07-05

## 2019-07-05 RX ORDER — LIDOCAINE HYDROCHLORIDE AND EPINEPHRINE BITARTRATE 20; .01 MG/ML; MG/ML
10 INJECTION, SOLUTION SUBCUTANEOUS ONCE
Status: COMPLETED | OUTPATIENT
Start: 2019-07-05 | End: 2019-07-05

## 2019-07-05 RX ORDER — SODIUM CHLORIDE 0.9 % (FLUSH) 0.9 %
3-10 SYRINGE (ML) INJECTION AS NEEDED
Status: DISCONTINUED | OUTPATIENT
Start: 2019-07-05 | End: 2019-07-06 | Stop reason: HOSPADM

## 2019-07-05 RX ORDER — HYDROMORPHONE HYDROCHLORIDE 1 MG/ML
0.5 INJECTION, SOLUTION INTRAMUSCULAR; INTRAVENOUS; SUBCUTANEOUS
Status: DISCONTINUED | OUTPATIENT
Start: 2019-07-05 | End: 2019-07-06 | Stop reason: HOSPADM

## 2019-07-05 RX ORDER — ONDANSETRON 2 MG/ML
4 INJECTION INTRAMUSCULAR; INTRAVENOUS EVERY 6 HOURS PRN
Status: DISCONTINUED | OUTPATIENT
Start: 2019-07-05 | End: 2019-07-06 | Stop reason: HOSPADM

## 2019-07-05 RX ORDER — SODIUM CHLORIDE 0.9 % (FLUSH) 0.9 %
3 SYRINGE (ML) INJECTION EVERY 12 HOURS SCHEDULED
Status: DISCONTINUED | OUTPATIENT
Start: 2019-07-05 | End: 2019-07-06 | Stop reason: HOSPADM

## 2019-07-05 RX ORDER — IPRATROPIUM BROMIDE AND ALBUTEROL SULFATE 2.5; .5 MG/3ML; MG/3ML
3 SOLUTION RESPIRATORY (INHALATION) EVERY 4 HOURS PRN
Status: DISCONTINUED | OUTPATIENT
Start: 2019-07-05 | End: 2019-07-06 | Stop reason: HOSPADM

## 2019-07-05 RX ORDER — OXYCODONE AND ACETAMINOPHEN 7.5; 325 MG/1; MG/1
1 TABLET ORAL EVERY 4 HOURS PRN
Status: DISCONTINUED | OUTPATIENT
Start: 2019-07-05 | End: 2019-07-06 | Stop reason: HOSPADM

## 2019-07-05 RX ORDER — OXYCODONE HYDROCHLORIDE AND ACETAMINOPHEN 5; 325 MG/1; MG/1
1 TABLET ORAL ONCE
Status: COMPLETED | OUTPATIENT
Start: 2019-07-05 | End: 2019-07-05

## 2019-07-05 RX ADMIN — OXYCODONE HYDROCHLORIDE AND ACETAMINOPHEN 1 TABLET: 7.5; 325 TABLET ORAL at 19:56

## 2019-07-05 RX ADMIN — IOPAMIDOL 100 ML: 612 INJECTION, SOLUTION INTRAVENOUS at 03:19

## 2019-07-05 RX ADMIN — IPRATROPIUM BROMIDE AND ALBUTEROL SULFATE 3 ML: 2.5; .5 SOLUTION RESPIRATORY (INHALATION) at 18:31

## 2019-07-05 RX ADMIN — HYDROMORPHONE HYDROCHLORIDE 0.5 MG: 1 INJECTION, SOLUTION INTRAMUSCULAR; INTRAVENOUS; SUBCUTANEOUS at 13:45

## 2019-07-05 RX ADMIN — LIDOCAINE HYDROCHLORIDE AND EPINEPHRINE BITARTRATE 10 ML: 20; .01 INJECTION, SOLUTION SUBCUTANEOUS at 02:00

## 2019-07-05 RX ADMIN — OXYCODONE HYDROCHLORIDE AND ACETAMINOPHEN 1 TABLET: 5; 325 TABLET ORAL at 05:08

## 2019-07-05 RX ADMIN — THROMBIN, TOPICAL (BOVINE) 5000 UNITS: KIT at 02:14

## 2019-07-05 RX ADMIN — BUDESONIDE AND FORMOTEROL FUMARATE DIHYDRATE 2 PUFF: 160; 4.5 AEROSOL RESPIRATORY (INHALATION) at 18:31

## 2019-07-05 RX ADMIN — LIDOCAINE HYDROCHLORIDE,EPINEPHRINE BITARTRATE 10 ML: 20; .01 INJECTION, SOLUTION INFILTRATION; PERINEURAL at 02:00

## 2019-07-05 RX ADMIN — IPRATROPIUM BROMIDE AND ALBUTEROL SULFATE 3 ML: 2.5; .5 SOLUTION RESPIRATORY (INHALATION) at 07:05

## 2019-07-05 RX ADMIN — IPRATROPIUM BROMIDE AND ALBUTEROL SULFATE 3 ML: 2.5; .5 SOLUTION RESPIRATORY (INHALATION) at 10:24

## 2019-07-05 RX ADMIN — OXYCODONE HYDROCHLORIDE AND ACETAMINOPHEN 1 TABLET: 7.5; 325 TABLET ORAL at 08:48

## 2019-07-05 RX ADMIN — PERFLUTREN: 6.52 INJECTION, SUSPENSION INTRAVENOUS at 11:45

## 2019-07-05 RX ADMIN — ONDANSETRON HYDROCHLORIDE 4 MG: 2 SOLUTION INTRAMUSCULAR; INTRAVENOUS at 13:42

## 2019-07-05 RX ADMIN — SILVER NITRATE APPLICATORS 1 EACH: 25; 75 STICK TOPICAL at 02:11

## 2019-07-05 RX ADMIN — SODIUM CHLORIDE, PRESERVATIVE FREE 3 ML: 5 INJECTION INTRAVENOUS at 08:48

## 2019-07-05 RX ADMIN — IPRATROPIUM BROMIDE AND ALBUTEROL SULFATE 3 ML: 2.5; .5 SOLUTION RESPIRATORY (INHALATION) at 14:48

## 2019-07-05 RX ADMIN — BUDESONIDE AND FORMOTEROL FUMARATE DIHYDRATE 2 PUFF: 160; 4.5 AEROSOL RESPIRATORY (INHALATION) at 09:26

## 2019-07-05 RX ADMIN — SODIUM CHLORIDE, PRESERVATIVE FREE 3 ML: 5 INJECTION INTRAVENOUS at 20:05

## 2019-07-05 RX ADMIN — IPRATROPIUM BROMIDE AND ALBUTEROL SULFATE 3 ML: 2.5; .5 SOLUTION RESPIRATORY (INHALATION) at 22:47

## 2019-07-05 NOTE — ED NOTES
MD GONZALEZ REMAINS AT BEDSIDE.  BLEEDING CONTROLLED.  WOUND CLOSURE CONTINUING AT THIS TIME. .     Otis Zimmer, RN  07/05/19 [id number]

## 2019-07-05 NOTE — CONSULTS
PULMONARY & CRITICAL CARE CONSULT - [facility]    19, 3:41 PM  Patient Care Team:  Sonu Lennon MD as PCP - General  Sonu Lennon MD as PCP - Family Medicine  Ar Kendrick MD as Consulting Physician (Pulmonary Disease)  [facility]  Name: Joana Cuevas  : 1950  MRN: [id number]  Contact Serial Number [id number]    Chief complaint: Pulmonary fibrosis  HPI:  We have been consulted by Cuba Velázquez DO to see this 68 y.o. female born on 1950.  She is known to me to have a history of COPD pulmonary fibrosis chronic respiratory failure with hypoxia, respiratory bronchiolitis interstitial lung disease with an FEV1 of 32% predicted in 2017 and normal diffusion capacity.  She has had a hypoxic response to exercise with tachycardia on a 6-minute walk test.  We have been following her with repeat imaging to reassess pulmonary fibrosis and bronchiectasis.  She was due to see me in the next couple of weeks with plans for a repeat CT of the chest.  In the meantime she has had a syncopal episode leading to a fall and a severe superficial head injury requiring extensive suturing and fortunately no associated brain injury.  She is in the hospital for evaluation of the syncope.  CT angiogram was done as part of that work-up and Dr. Velázquez is asked me to see her.  She has mild intermittent shortness of breath in her chest aggravated by exertion alleviated by rest and worsening for 2 nights in a row earlier this week but that aspect of this has resolved, with symptoms present for several months and unchanged overall.  Past Medical History:   has a past medical history of Chronic bronchitis (CMS/HCC), Guillain Barré syndrome (CMS/HCC) (2016), and Pneumonia.   has a past surgical history that includes Hysterectomy; Mastectomy; and Tonsilectomy, adenoidectomy, bilateral myringotomy and tubes.  No Known Allergies  Medications:    budesonide-formoterol 2 puff Inhalation BID - RT    ipratropium-albuterol 3 mL Nebulization Q4H - RT   sodium chloride 3 mL Intravenous Q12H        Family History:  Family History   Problem Relation Age of Onset   • Cancer Mother    • Diabetes Mother    • Heart failure Father      Social History:   reports that she quit smoking about 4 years ago. Her smoking use included cigarettes. She has a 70.00 pack-year smoking history. She has never used smokeless tobacco. She reports that she drinks alcohol. She reports that she does not use drugs.  Review of Systems:  Review of Systems   Constitutional: Negative for appetite change, diaphoresis, fatigue and fever.   HENT: Negative for congestion, sinus pressure, sneezing and trouble swallowing.    Eyes: Negative for visual disturbance.   Respiratory: Positive for cough and shortness of breath. Negative for chest tightness and stridor.    Cardiovascular: Negative for chest pain and leg swelling.   Gastrointestinal: Negative for abdominal pain, constipation, diarrhea, nausea and vomiting.   Endocrine: Negative for heat intolerance.   Genitourinary: Negative for hematuria.   Musculoskeletal: Negative for joint swelling.   Neurological: Positive for syncope and headaches. Negative for dizziness, seizures and light-headedness.   Hematological: Negative for adenopathy.   Psychiatric/Behavioral: Negative for agitation and sleep disturbance. The patient is not nervous/anxious.       Physical Exam:  Temp:  [97.5 °F (36.4 °C)-98.2 °F (36.8 °C)] 97.7 °F (36.5 °C)  Heart Rate:  [] 104  Resp:  [16-23] 18  BP: (108-151)/(68-99) 108/69No intake or output data in the 24 hours ending 07/05/19 1541      07/05/19  0157 07/05/19  0556   Weight: 88 kg (194 lb) 77.6 kg (171 lb 1.6 oz)     SpO2 Percentage    07/05/19 0733 07/05/19 1024 07/05/19 1448   SpO2: 96% 94% 96%     Physical Exam   Constitutional: She appears well-developed. No distress.   HENT:   Head: Normocephalic.   Nose: Nose normal.   Sutures left lateral head   Eyes: EOM are  normal. No scleral icterus.   Neck: No JVD present. No tracheal deviation present.   Cardiovascular: Normal rate and regular rhythm. Exam reveals no friction rub.   No murmur heard.  Pulmonary/Chest: Effort normal. No stridor. No respiratory distress. She has no wheezes. She has rales (non-velcro type).   Abdominal: Soft. There is no tenderness. There is no guarding.   Musculoskeletal: She exhibits no edema.   Skin: Skin is warm and dry. She is not diaphoretic.   Psychiatric: She has a normal mood and affect. Her behavior is normal.     Results from last 7 days   Lab Units 07/05/19  0155   WBC 10*3/mm3 9.58   HEMOGLOBIN g/dL 12.4   PLATELETS 10*3/mm3 254     Results from last 7 days   Lab Units 07/05/19  0155   SODIUM mmol/L 136   POTASSIUM mmol/L 4.3   CO2 mmol/L 30.0   BUN mg/dL 8   CREATININE mg/dL 0.37*   GLUCOSE mg/dL 116*         Lab Results   Component Value Date    PROBNP 172.0 07/05/2019        Recent radiology:   Imaging Results (last 72 hours)     Procedure Component Value Units Date/Time    MRI Brain Without Contrast [[id number]] Collected:  07/05/19 1328     Updated:  07/05/19 1340    Narrative:       EXAMINATION: MRI BRAIN WO CONTRAST- 7/5/2019 1:28 PM CDT  HISTORY: Syncope, fainting  COMPARISON: 07/05/2019  Technical: Multiplanar, multisequence imaging was performed through the  brain without the use of IV contrast.  FINDINGS:  There is no diffusion restriction to suggest acute ischemia.  There is normal-appearing anatomy at the craniocervical junction. The  pituitary gland is grossly normal in appearance.  There is no evidence of acute intracranial hemorrhage or midline shift.  There is a large left scalp hematoma with associated laceration.  The ventricles appear normal in configuration. Normal signal void is  seen in the carotid and basilar arteries.  There is some fluid in the sphenoid sinuses. Paranasal sinuses and  mastoid air cells otherwise appear clear. The visualized globes and  orbits are  unremarkable.  There are a few scattered periventricular and subcortical FLAIR signal  hyperintensities, a nonspecific finding most often seen as sequela of  chronic microvascular ischemia.    Impression:         1. Left scalp laceration with large hematoma.  2. No acute intracranial findings.  3. FLAIR signal abnormalities in the white matter which are nonspecific  but most often seen as sequela of chronic microvascular ischemia.  This report was finalized on 07/05/2019 13:37 by Dr. Ishan Jim MD.    US Carotid Bilateral [[id number]] Updated:  07/05/19 1243    CT Lumbar Spine Without Contrast [[id number]] Collected:  07/05/19 0719     Updated:  07/05/19 0724    Narrative:       EXAMINATION: CT LUMBAR SPINE WITHOUT IV CONTRAST 07/05/2019   COMPARISON: None.  HISTORY: Female, 68 years-old. FALL  TECHNIQUE: Multiple CT images were obtained of the lumbar spine without  IV contrast. The images were formatted in the axial, coronal and  sagittal planes.  Radiation dose equals DLP 1032 mGy-cm.  Automated exposure control dose  reduction technique was implemented.  FINDINGS:   Preservation of the normal lordosis of the lumbar spine.No acute  compression deformity. Subtle grade 1 anterolisthesis of L4 on L5  without pars defects identified. No abnormal soft tissue density within  the spinal canal.Mild disc height loss at multiple levels, worst at  L5-S1.The prevertebral and paraspinal soft tissues are unremarkable.    Impression:       No acute posttraumatic osseous finding.  This report was finalized on 07/05/2019 07:21 by Dr. Anita Nagel MD.    CT Thoracic Spine Without Contrast [[id number]] Collected:  07/05/19 0717     Updated:  07/05/19 0722    Narrative:       EXAMINATION:   CT thoracic spine without  contrast   DATE:   07/05/2019   HISTORY:  Fall  COMPARISON:  None  TECHNIQUE:  Routine non contrast of the thoracic spine were obtained.  The images were formatted in the axial, coronal and sagittal  planes.  Radiation dose equals  mGy-cm.  Automated exposure control dose  reduction technique was implemented.  FINDINGS:    THORACIC.  The thoracic spine is normal in position and alignment. No  acute compression deformity. No dislocation. No abnormal soft tissue  density within the spinal canal. Multilevel degenerative disc disease  with few Schmorl's nodes identified.     Emphysema.    Impression:       No acute osseous posttraumatic finding.  This report was finalized on 07/05/2019 07:19 by Dr. Anita Nagel MD.    CT Head Without Contrast [[id number]] Collected:  07/05/19 0713     Updated:  07/05/19 0721    Narrative:       EXAM:   CT OF THE HEAD WITHOUT IV CONTRAST   CT CERVICAL SPINE WITHOUT IV CONTRAST 07/05/2019  COMPARISON: None.   INDICATION: Trauma   PROCEDURE: Non contrast enhanced head CT and cervical spine CT were  performed. The head images were formatted in the axial plane at 5 mm  thick intervals. The cervical spine images were formatted in the axial,  coronal and sagittal planes.  FINDINGS:   HEAD: Left superficial scalp hematoma and laceration with radiopaque  foreign body present. Ventricles and cerebrospinal fluid spaces are  normal in size and configuration for the patient's age. There is no  evidence of mass-effect or midline shift. The gray-white differentiation  is preserved..  There is no evidence of intracranial contusion,  hemorrhage, or skull fracture.  The visualized portions of the paranasal  sinuses and mastoid air cells are unremarkable.  CERVICAL SPINE: The odontoid process is well approximated with the  anterior body of C1 and well aligned between the lateral masses of C1.  Subtle grade 1 anterolisthesis of C3 on C4. No acute compression  deformity. No dislocation. Well corticated calcific density along the  inferior endplate of C5 is favored to be chronic. There is no acute  fracture or dislocation. There is no paraspinal hematoma. Multilevel  degenerative changes with  disc disease and facet arthropathy.    Impression:       CT head. Left superficial scalp hematoma and laceration with radiopaque  foreign body. No associated calvarial fracture or acute intracranial  abnormalities.  CT cervical spine. No acute osseous posttraumatic finding.  This report was finalized on 07/05/2019 07:17 by Dr. Anita Nagel MD.    CT Cervical Spine Without Contrast [[id number]] Collected:  07/05/19 0713     Updated:  07/05/19 0721    Narrative:       EXAM:   CT OF THE HEAD WITHOUT IV CONTRAST   CT CERVICAL SPINE WITHOUT IV CONTRAST 07/05/2019  COMPARISON: None.   INDICATION: Trauma   PROCEDURE: Non contrast enhanced head CT and cervical spine CT were  performed. The head images were formatted in the axial plane at 5 mm  thick intervals. The cervical spine images were formatted in the axial,  coronal and sagittal planes.  FINDINGS:   HEAD: Left superficial scalp hematoma and laceration with radiopaque  foreign body present. Ventricles and cerebrospinal fluid spaces are  normal in size and configuration for the patient's age. There is no  evidence of mass-effect or midline shift. The gray-white differentiation  is preserved..  There is no evidence of intracranial contusion,  hemorrhage, or skull fracture.  The visualized portions of the paranasal  sinuses and mastoid air cells are unremarkable.  CERVICAL SPINE: The odontoid process is well approximated with the  anterior body of C1 and well aligned between the lateral masses of C1.  Subtle grade 1 anterolisthesis of C3 on C4. No acute compression  deformity. No dislocation. Well corticated calcific density along the  inferior endplate of C5 is favored to be chronic. There is no acute  fracture or dislocation. There is no paraspinal hematoma. Multilevel  degenerative changes with disc disease and facet arthropathy.    Impression:       CT head. Left superficial scalp hematoma and laceration with radiopaque  foreign body. No associated calvarial  fracture or acute intracranial  abnormalities.  CT cervical spine. No acute osseous posttraumatic finding.  This report was finalized on 07/05/2019 07:17 by Dr. Anita Nagel MD.    CT Chest With Contrast [[id number]] Collected:  07/05/19 0710     Updated:  07/05/19 0716    Narrative:       CT CHEST W CONTRAST- 7/5/2019 3:16 AM CDT  HISTORY: TRAUMA   COMPARISON: CT chest dated 03/12/2019   FINDINGS:   Neck base: The imaged portion of the neck and thyroid gland is  unremarkable.   Lungs: Emphysema. Bilateral jugular nodular opacities, which may reflect  atypical infection the proper clinical setting. No pneumothorax. No  pleural effusion is present. The trachea and bronchial tree are patent.   Heart: Normal in size and appearance. There is no pericardial effusion.   Vasculature: Aorta is normal in caliber and appearance without  significant atherosclerosis, stenosis, or aneurysm. Calcifications are  seen in the coronary arteries. There is atherosclerosis in the great  vessels without definite stenosis. The pulmonary arteries are enlarged,  suggestive of pulmonary arterial hypertension.  Lymph nodes: No enlarged axillary, hilar, or mediastinal lymph nodes.   Bones and soft tissues: No acute osseous or soft tissue abnormality is  seen. There are no worrisome osseous lesions.   Upper abdomen: No acute pathology. Hepatosteatosis.     Impression:       1.   No acute posttraumatic finding.  2.  Reticulonodular opacities in the lower lobes bilaterally and in the  lingula. Consider atypical infection the proper clinical setting..  This report was finalized on 07/05/2019 07:13 by Dr. Anita Nagel MD.        My radiograph interpretation/independent review of imaging: CT chest shows some fibrotic changes in the peripheral lateral lingula, bronchiectasis, absence of subpleural honeycombing  Other test results (not lab or imaging): Results for orders placed during the hospital encounter of 12/26/14   CONVERTED (HISTORICAL)  ECHO    Narrative Transthoracic Echocardiogram    Patient:     NEEL RAINEY                                      Med Rec#:    [id number]                    Date:     2014  MRN:         [id number]                    Pt. Type: Outpatient  Accession#:  [id number]                    Height:   162.56 cm/63.4   :         1950                 Weight:   64.41 kg/141.7   Age:         64y                        BSA:      1.69                        Sex:         F                            Referring:      [facility] ([facility])  Reading:        Driss Gray MD  Sonographer:    Madelin Whaley  Ordering:       SUNDAR SU  ______________________________________________________________________      Indication: Syncope    Rhythm:     HR         BP 98/57    Conclusions:     Evidence of mild diastolic dysfunction, otherwise normal cardiac  study.    Findings     Technical Comments:    The study quality is good.    Left Ventricle:    The left ventricular chamber size is normal. Global left ventricular  wall motion and contractility are within normal limits. There is normal  left ventricular systolic function. The estimated ejection fraction is  60-65%.  Abnormal left ventricular diastolic function is observed.   Left Atrium:    The left atrial chamber size is normal.   Right Ventricle:    The right ventricular chamber size and systolic function are within  normal limits.   Right Atrium:    The right atrial cavity size is normal. The intratrial septum is  intact.   Aortic Valve:    The aortic valve structure is normal. There is no evidence of aortic  regurgitation. There is no evidence of aortic stenosis.   Mitral Valve:    The mitral valve appears normal in structure and function. The mitral  valve leaflets appear normal. There is no evidence of mitral  regurgitation. There is no evidence of mitral stenosis.   Tricuspid Valve:    The tricuspid valve leaflets are normal.  There is no evidence of  tricuspid valve  regurgitation. There is no tricuspid stenosis.   Pulmonic Valve:    The pulmonic valve is not well visualized.   Pericardium:    The pericardium appears normal. There is no pericardial effusion.   Aorta:    There is no dilatation of the ascending aorta.   Measurements       Chambers 2D  Name                          Value                  Normal Range     IVSd (2D)                     0.9 cm                 none  LVPWd (2D)                    0.9 cm                 none  IVS:LVPW ratio (2D)           1 ratio                none  LVIDd (2D)                    5 cm                   none  LVIDs (2D)                    3.3 cm                 none  LVIDd (2D) index              2.96 cm/m2             none  LVIDs (2D) index              1.95 cm/m2             none  LV FS (2D)                    34 %                   none  LV FS (Teichholz) (2D)        34 %                   none  LV FS (cube) (2D)             34 %                   none  EF Teichholz (2D)             63 %                   none  Ao root diameter (2D)         3 cm                   none  LA dimension (AP) 2D          3.1 cm                 none  LA:Ao ratio (2D)              1.03 ratio             none  Aortic root diameter (2D) inde1.78 cm/m2             none  LA dimension (2D) index       1.83 cm/m2             none    Volumes/Mass  Name                          Value                  Normal Range     LA ESV SP 4CH (MOD)           37 ml                  none  LA ESV SP 2CH (MOD)           43 ml                  none  LA ESV BP (MOD)               40 ml                  none  LA ESV BP (MOD) index         23.67 ml/m2            none  LV EDV SP 4CH (MOD)           80 ml                  none  LV ESV SP 4CH (MOD)           32 ml                  none  EF SP 4CH (MOD)               60 %                   none  LV mass (2D)                  158.21 g               none  LV mass (2D) index            93.62 g/m2             none    Diastolic/Systolic  Function  Name                          Value                  Normal Range     MV E-wave Vmax                0.745 m/sec            none  MV deceleration time          127 msec               none  MV A-wave Vmax                0.893 m/sec            none  MV E:A ratio                  0.83 ratio             none  LV septal e' Vmax             0.0497 m/sec           none  LV lateral e' Vmax            0.0809 m/sec           none  LV average e' Vmax            0.07 m/sec             none  LV E:e' septal ratio          14.99 ratio            none  LV E:e' lateral ratio         9.21 ratio             none  LV average E:e' ratio         10.64 ratio            none    Aortic Valve  Name                          Value                  Normal Range     AV Vmax                       1.32 m/sec             none  AV VTI                        27.1 cm                none  AV peak gradient              6.97 mmHg              none  AV mean gradient              3 mmHg                 none  LVOT diameter                 2.3 cm                 none  LVOT Vmax                     1.05 m/sec             none  LVOT VTI                      23.2 cm                none  LVOT peak gradient            4 mmHg                 none  LVOT mean gradient            2 mmHg                 none  DOI (VTI)                     0.86 ratio             none  DOI (Vmax)                    0.8 ratio              none  SV LVOT                       96.34 ml               none  CARYL (continuity Vmax)         3.3 cm2                none  CARYL (continuity Vmax) index   1.95 cm2/m2            none  CARYL (continuity VTI)          3.56 cm2               none  CARYL (continuity VTI) index    2.11 cm2/m2            none            Electronically signed by Driss Gray MD on 12/26/2014 15:34:55  Thank you for the courtesy of this referral.     Independent review of ekg: Normal QT interval.  Sinus rhythm.  Problem List as identified by Epic (may contain historical,  inactive problems)  Patient Active Problem List   Diagnosis   • Stage 4 very severe COPD by GOLD classification (CMS/[facility])   • Respiratory bronchiolitis interstitial lung disease (CMS/HCC)   • Chronic respiratory failure with hypoxia (CMS/HCC)   • Pulmonary fibrosis (CMS/HCC)   • Cough   • Syncope   • Laceration of head     Pulmonary Assessment:  New problem (to me), with additional workup planned: Pulmonary fibrosis with pulmonary infiltrates described on CT of the chest.  I think this is probably part of her chronic process, warrants continued follow-up.  We will plan repeat permanent function test in the outpatient setting for this.  The CT scan that is scheduled in the coming days can be canceled.  New problem (to me), no additional workup planned: Syncope, under work-up  Other problems either stable, failing to improve or worsenin. Bronchiectasis, uncomplicated without acute exacerbation  2. Personal history of nicotine dependency, resolved  3. Laceration of head, sutured    Recommend/plan:   · We will plan outpatient follow-up.  We will follow-up pulmonary function testing in the outpatient setting.  · No need to repeat the CT since one was done here.  · Doubt syncopal episode is related to her underlying pulmonary disease.  · Okay with me for her to go home tomorrow in absence of new problems.  Call me if any other issues warranting my attention arise.    Thank you for this consult.  Electronically signed by Ar Kendrick MD, 19, 3:41 PM.

## 2019-07-05 NOTE — PLAN OF CARE
Problem: Patient Care Overview  Goal: Plan of Care Review  Outcome: Ongoing (interventions implemented as appropriate)   07/05/19 1523   Coping/Psychosocial   Plan of Care Reviewed With patient   Plan of Care Review   Progress no change   OTHER   Outcome Summary pt co head pain, medication given with relief. MRI done today. pt co dizzness and nausea. cont to monitor.      Goal: Individualization and Mutuality  Outcome: Ongoing (interventions implemented as appropriate)    Goal: Discharge Needs Assessment  Outcome: Ongoing (interventions implemented as appropriate)    Goal: Interprofessional Rounds/Family Conf  Outcome: Ongoing (interventions implemented as appropriate)      Problem: Syncope (Adult)  Goal: Identify Related Risk Factors and Signs and Symptoms  Outcome: Ongoing (interventions implemented as appropriate)    Goal: Physical Safety/Health Maintenance  Outcome: Outcome(s) achieved Date Met: 07/05/19    Goal: Optimal Emotional/Functional [place]  Outcome: Ongoing (interventions implemented as appropriate)      Problem: Fall Risk (Adult)  Goal: Identify Related Risk Factors and Signs and Symptoms  Outcome: Ongoing (interventions implemented as appropriate)    Goal: Absence of Fall  Outcome: Ongoing (interventions implemented as appropriate)      Problem: Pain, Chronic (Adult)  Goal: Identify Related Risk Factors and Signs and Symptoms  Outcome: Ongoing (interventions implemented as appropriate)    Goal: Acceptable Pain/Comfort Level and Functional Ability  Outcome: Ongoing (interventions implemented as appropriate)

## 2019-07-05 NOTE — ED NOTES
MD GONZALEZ REMAINS AT BEDSIDE FOR ATTEMPTED WOUND CLOSURE AND BLEEDING CONTROL.  DIRECT PRESSURE REMAINS IN EFFECT AFTER THROMBIN PLACEMENT.       Otis Zimmer RN  07/05/19 8668

## 2019-07-05 NOTE — ED NOTES
WOUND CLOSURE COMPLETED PER MD MCNAIR.  BLEEDING CONTROLLED WITH 4X4 DRESSING TO THE LEFT PARIETAL.      Otis Zimmer, RN  07/05/19 0255

## 2019-07-05 NOTE — ED PROVIDER NOTES
Subjective   This is a 68-year-old female presenting to the emergency department with a syncopal episode and a laceration to the head.  Patient states that she does not member everything but however she was getting ready for bed when she was putting on her nightgown and then she fell to the ground.  She believes that she may have hit the edge of her table and got a laceration to the head.  Daughter who is with her states that she was over the found her and called EMS there is a lot of bleeding on the ground.  Patient was alert is not able to get herself up.  Patient has a large amount of bleeding coming from the left side of her scalp thus the rest of the history cannot be obtained at this time secondary to needing control bleeding.            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   • Pneumonia        No Known Allergies    Past Surgical History:   Procedure Laterality Date   • HYSTERECTOMY     • MASTECTOMY     • TONSILECTOMY, ADENOIDECTOMY, BILATERAL MYRINGOTOMY AND TUBES         Family History   Problem Relation Age of Onset   • Cancer Mother    • Diabetes Mother    • Heart failure Father        Social History     Socioeconomic History   • Marital status:      Spouse name: Not on file   • Number of children: Not on file   • Years of education: Not on file   • Highest education level: Not on file   Tobacco Use   • Smoking status: Former Smoker     Packs/day: 2.00     Years: 35.00     Pack years: 70.00     Types: Cigarettes     Last attempt to quit:      Years since quittin.5   • Smokeless tobacco: Never Used   Substance and Sexual Activity   • Alcohol use: Yes   • Drug use: No   • Sexual activity: Defer           Objective   Physical Exam   Constitutional: She is oriented to person, place, and time. She appears well-developed and well-nourished.    HENT:   Head: Normocephalic.   Patient has what appears to be a venous bleed coming from the scalp difficult to control with direct pressure.  There is a large amount of hair as well as dried blood obscuring the view.   Eyes: EOM are normal. Pupils are equal, round, and reactive to light.   Neck: Normal range of motion. Neck supple.   C-collar in place no C-spine tenderness   Cardiovascular: Normal rate, regular rhythm and normal heart sounds.   2+ pulses in upper and lower extremities no chest wall tenderness   Pulmonary/Chest: Effort normal and breath sounds normal.   Abdominal: Soft. Bowel sounds are normal.   No abdominal tenderness   Musculoskeletal: Normal range of motion.   No pain to palpation of any of the extremities   Neurological: She is alert and oriented to person, place, and time.   Skin: Skin is warm. Capillary refill takes less than 2 seconds.   Psychiatric: She has a normal mood and affect. Her behavior is normal. Thought content normal.       Laceration Repair  Date/Time: 7/5/2019 4:14 AM  Performed by: Dipti Perez MD  Authorized by: Dipti Perez MD     Consent:     Consent obtained:  Verbal and emergent situation    Consent given by:  Patient    Risks discussed:  Infection, need for additional repair, nerve damage, pain, poor cosmetic result, poor wound healing, retained foreign body, vascular damage and tendon damage    Alternatives discussed:  No treatment  Anesthesia (see MAR for exact dosages):     Anesthesia method:  Local infiltration    Local anesthetic:  Lidocaine 1% WITH epi  Laceration details:     Location:  Scalp    Scalp location:  L parietal    Length (cm):  2    Depth (mm):  30  Exploration:     Hemostasis achieved with:  Direct pressure (Thrombin, silver nitrate sticks, figure 8 sutures)    Wound exploration: wound explored through full range of motion and entire depth of wound probed and visualized      Wound extent: fascia violated      Contaminated: no    Treatment:      Area cleansed with:  Saline (Hydrogen peroxide)    Amount of cleaning:  Extensive    Irrigation solution:  Sterile saline    Irrigation volume:  Large amounts of irrigation with hydrogen peroxide as well as saline were used to control bleeding and then cleaned around the wound  Mucous membrane repair:     Suture size:  3-0    Suture material:  Vicryl    Suture technique:  Simple interrupted    Number of sutures:  3  Skin repair:     Repair method:  Sutures    Suture size:  3-0    Suture technique:  Simple interrupted    Number of sutures:  8  Approximation:     Approximation:  Close  Post-procedure details:     Patient tolerance of procedure:  Tolerated with difficulty  Comments:      Hemostasis was difficult to control patient needed multiple figure 8 sutures in the deeper layer as well as a simple interrupted Vicryl suture to close the galea.               ED Course  ED Course as of Jul 05 0515 Fri Jul 05, 2019   0412 Patient emergently had her left scalp repaired with large amounts of gauze as well as soaked gauze and thrombin as well as silver nitrate sticks internally into the wound.  Upon evaluation of the wound it was approximately half a finger in diameter into the wound though the opening was approximately 2 cm on the left aspect of the parietal scalp.  Evaluation of the wound showed violation of the glia which was able to be repaired with absorbable Vicryl sutures.  Hemostasis was eventually controlled with figure-of-eight sutures.  [AP]   0510 Patient on reevaluation has decreased swelling of the wound and hemostasis is well controlled.  Patient's work-up thus far is negative but she will be admitted for drop syncope as she has high risk.  Case was discussed with Dr. Escalona.  I have a low suspicion for sepsis in this patient.  Imaging unremarkable for any injury.  The foreign body that they are visualizing on CAT scan of the head is a silver nitrate there was used and formulated with the blood which is  why it is visible on CAT scan.  [AP]      ED Course User Index  [AP] Dipti Perez MD                  [facility]      Final diagnoses:   Syncope, unspecified syncope type   Laceration of scalp, initial encounter            Dipti Perez MD  07/05/19 0418       Dipti Perez MD  07/05/19 0515

## 2019-07-05 NOTE — PROGRESS NOTES
Up in bed.  No distress.  No family currently present.  Discussed with her nurse, April.    She was seen after midnight by Dr. Escalona.  His notes are reviewed.  Also reviewed Dr. Perez's notes from the emergency department.  He had to repair a complex laceration of her left scalp over the temporoparietal area.  Dr. Arguelles checked on this today as well at Dr. Escalona's request.  He called me and felt that the repair was done very well.  She does have a hematoma and some bruising.    The patient had a syncopal episode in her kitchen last night while trying to get ready for bed.  She tells me that her family had been over and they had all had dinner together.  They did fireworks outside and then she came in to get ready for bed.  She does not remember anything about the event of passing out.  She woke up with blood on the floor and called her daughter who had her transported here.  She does not claim any dizziness or near syncope prior to the event.  She did not become overheated while being outside last night.    Her telemetry has been uneventful.  She has been in sinus rhythm the entire time that she has been here.  Orthostatic vital signs have been negative.  Dr. Escalona got a carotid duplex examination which shows no significant stenosis or abnormal flow.  The official report of her echocardiogram is pending, however, she has a normal aortic valve area and a normal ejection fraction by the measurements.  She does have a history of a PFO.  She had an MRI that failed to show a stroke.  She is intact neurologically.    We discussed the possibility of going home tonight.  However, she states that she has been dizzy upon standing up a few times today.  That was not evident yesterday.  We will continue to monitor orthostatic vital signs.  She has also been nauseous, which she feels is secondary to pain medication for her scalp laceration.    She does give history that she has been more short of breath than her baseline  over the last week.  However, she has clear lungs on exam and is on her home dose of oxygen.  She is due to have a repeat CT scan of her chest as an outpatient next week with Dr. Kendrick.  However, a CT of the chest was obtained in the emergency department last night showed reticulonodular opacities in the lower lobes bilaterally and in the lingula.  These are likely chronic findings.    She very much wants to see Dr. Kendrick as a courtesy prior to going home.    I told her that she will likely be able to go home tomorrow and she is agreeable.    Cuba Velázquez,   07/05/19  2:06 PM

## 2019-07-05 NOTE — ED NOTES
Pt was cleaned up (blood on head/face/neck/hair) and linens/gown changed.  Pain meds given, and ice water.  Belongings (pants) placed in bag and given to family     Cait Graves RN  07/05/19 8948

## 2019-07-05 NOTE — ED NOTES
"Approx. Time 02:00:  Dr. Perez at bedside to repair lac on left parietal head, bleeding not yet controlled.  Approx 2\" hakan area was shaved with pt. Permission to suture.       Cait Graves, RN  07/05/19 0528    "

## 2019-07-05 NOTE — PLAN OF CARE
Problem: Patient Care Overview  Goal: Plan of Care Review  Outcome: Ongoing (interventions implemented as appropriate)   07/05/19 0631   Coping/Psychosocial   Plan of Care Reviewed With patient   Plan of Care Review   Progress no change   OTHER   Outcome Summary Pt admitted this AM with syncopal episode resulting in fall and head laceration. Neurosurgery consulted to ensure wound is suitably sutured. Pt's family requests Dr. Kendrick be consulted as a courtesty. Pt to have 2DE, NICS, MRI brain today. VSS. ST on tele.

## 2019-07-05 NOTE — H&P
[facility] Medicine Services  HISTORY AND PHYSICAL    Date of Admission: 7/5/2019  Primary Care Physician: Sonu Lennon MD    Subjective     Chief Complaint: Syncope    History of Present Illness  Patient is a 68-year-old female past medical history of severe COPD and pulmonary fibrosis on home oxygen as well as remote history of Dixon Barré syndrome.  She was in her usual state of health tonight and she was getting ready for bed at about 1:00 in the morning.  She states she already had her gown on and was getting ready to climb into bed when she had a kathe syncopal episode.  She does not remember anything about it she woke up with her daughter and felt moisture on her hand when she touched her head.  She had significant hemorrhage.  She presented the emergency room and had a large deep laceration repaired on her posterior left scalp.  CT scans of the head and neck chest were all unremarkable for fractures.  The final report are still pending on them.  She does have a history of a PFO with significant right to left shunt that was supposed to be repaired several years ago.  The insurance company refused to cover the procedure because she had not had any events yet.  They primarily wanted to do it because of her oxygen demands and the thought that the shunt was causing her need for oxygen.  She denies any chest pain or note of anything abnormal prior to this.  She states she was wearing her oxygen and has been doing well otherwise recently.  She has been sutured up in the emergency room her examination is unremarkable neuro exam is normal.  She is being admitted for further evaluation of syncope.        Review of Systems   14 point review of systems negative except for as per HPI    Otherwise complete ROS reviewed and negative except as mentioned in the HPI.    Past Medical History:   Past Medical History:   Diagnosis Date   • Chronic bronchitis (CMS/HCC)    • Guillain  "Barré syndrome (CMS/[facility]) 8/12/2016   • Pneumonia      Past Surgical History:  Past Surgical History:   Procedure Laterality Date   • HYSTERECTOMY     • MASTECTOMY     • TONSILECTOMY, ADENOIDECTOMY, BILATERAL MYRINGOTOMY AND TUBES       Social History:  reports that she quit smoking about 4 years ago. Her smoking use included cigarettes. She has a 70.00 pack-year smoking history. She has never used smokeless tobacco. She reports that she drinks alcohol. She reports that she does not use drugs.    Family History: family history includes Cancer in her mother; Diabetes in her mother; Heart failure in her father.       Allergies:  No Known Allergies  Medications:  Prior to Admission medications    Medication Sig Start Date End Date Taking? Authorizing Provider   acetaminophen (TYLENOL) 500 MG tablet Take 500 mg by mouth.    Mike Davidson MD   albuterol (PROVENTIL) (2.5 MG/3ML) 0.083% nebulizer solution 2.5 mg.    Mike Davidson MD   Biotin 96963 MCG tablet Take  by mouth.    Mike Davidson MD   budesonide-formoterol (SYMBICORT) 160-4.5 MCG/ACT inhaler Inhale.    Mike Davidson MD   MAGNESIUM PO Take  by mouth.    Mike Davidson MD   O2 (OXYGEN) Inhale.    Mike Davidson MD   SPIRIVA HANDIHALER 18 MCG per inhalation capsule INHALE THE CONTENTS OF 1 CAPSULE EVERY DAY 5/15/19   Ar Kendrick MD     Objective     Vital Signs: /69   Pulse 108   Temp 97.5 °F (36.4 °C) (Oral)   Resp 20   Ht 160 cm (63\")   Wt 88 kg (194 lb)   SpO2 96%   BMI 34.37 kg/m²   Physical Exam  Gen.:  Well-nourished well-developed female in no acute distress  HEENT: Laceration left posterior scalp with significant swelling, normocephalic.  Pupils equal, round, and reactive to light. Extraocular movements intact.  Sclerae anicteric.  TMs negative.  Mucous membranes moist.  Neck is supple without lymphadenopathy.  No JVD is noted.  No carotid bruits are auscultated.  Oropharynx is without " erythema or exudate.   Chest: Mild wheezes bilaterally crackles bilaterally   CV: Regular rate and rhythm.  Normal S1-S2.  No gallops, murmurs. or rubs.  Abdomen: Soft, nontender, nondistended.  Active bowel sounds,  No hepatosplenomegaly.  No masses.  No hernias.  Extremities: No clubbing, edema, or cyanosis.  Capillary refill is normal.  Pulses are 2+ and symmetric at radial and dorsalis pedis.  Posterior tibial pulses are intact and 2+ palpable.  Neuro: Patient is awake, alert, and oriented ×3.  Cranial nerves II through XII are grossly intact.  Motor is 5 out of 5 bilaterally.  DTRs are 2+ and symmetric bilaterally. Sensory exam is nonfocal  Skin: Warm, dry, and intact.  No evidence of breakdown.  Sensorium: Normal thought and affect    Nursing notes and vital signs reviewed        Results Reviewed:  Lab Results (last 24 hours)     Procedure Component Value Units Date/Time    Troponin [[id number]]  (Normal) Collected:  07/05/19 0155    Specimen:  Blood Updated:  07/05/19 0455     Troponin I <0.012 ng/mL     BNP [[id number]]  (Normal) Collected:  07/05/19 0155    Specimen:  Blood Updated:  07/05/19 0455     proBNP 172.0 pg/mL     [place] Draw [[id number]] Collected:  07/05/19 0155    Specimen:  Blood Updated:  07/05/19 0300    Narrative:       The following orders were created for panel order [place] Draw.  Procedure                               Abnormality         Status                     ---------                               -----------         ------                     Light Blue Top[[id number]]                                   Final result               Green Top (Gel)[[id number]]                                  Final result               Lavender Top[[id number]]                                     Final result               Red Top[[id number]]                                          Final result                 Please view results for these tests on the individual orders.    Light Blue Top [[id number]]  Collected:  07/05/19 0155    Specimen:  Blood Updated:  07/05/19 0300     Extra Tube hold for add-on     Comment: Auto resulted       Green Top (Gel) [[id number]] Collected:  07/05/19 0155    Specimen:  Blood Updated:  07/05/19 0300     Extra Tube Hold for add-ons.     Comment: Auto resulted.       Lavender Top [[id number]] Collected:  07/05/19 0155    Specimen:  Blood Updated:  07/05/19 0300     Extra Tube hold for add-on     Comment: Auto resulted       Red Top [[id number]] Collected:  07/05/19 0155    Specimen:  Blood Updated:  07/05/19 0300     Extra Tube Hold for add-ons.     Comment: Auto resulted.       Comprehensive Metabolic Panel [[id number]]  (Abnormal) Collected:  07/05/19 0155    Specimen:  Blood Updated:  07/05/19 0258     Glucose 116 mg/dL      BUN 8 mg/dL      Creatinine 0.37 mg/dL      Sodium 136 mmol/L      Potassium 4.3 mmol/L      Chloride 94 mmol/L      CO2 30.0 mmol/L      Calcium 8.9 mg/dL      Total Protein 7.4 g/dL      Albumin 4.30 g/dL      ALT (SGPT) 20 U/L      AST (SGOT) 49 U/L      Alkaline Phosphatase 46 U/L      Total Bilirubin 0.4 mg/dL      eGFR Non African Amer >150 mL/min/1.73      Globulin 3.1 gm/dL      A/G Ratio 1.4 g/dL      BUN/Creatinine Ratio 21.6     Anion Gap 12.0 mmol/L     Narrative:       GFR Normal >60  Chronic Kidney Disease <60  Kidney Failure <15    aPTT [[id number]]  (Normal) Collected:  07/05/19 0155    Specimen:  Blood Updated:  07/05/19 0256     PTT 30.0 seconds     Protime-INR [[id number]]  (Abnormal) Collected:  07/05/19 0155    Specimen:  Blood Updated:  07/05/19 0256     Protime 12.0 Seconds      INR 0.86    CBC & Differential [[id number]] Collected:  07/05/19 0155    Specimen:  Blood Updated:  07/05/19 0250    Narrative:       The following orders were created for panel order CBC & Differential.  Procedure                               Abnormality         Status                     ---------                               -----------         ------                      CBC Auto Differential[[id number]]        Abnormal            Final result                 Please view results for these tests on the individual orders.    CBC Auto Differential [[id number]]  (Abnormal) Collected:  07/05/19 0155    Specimen:  Blood Updated:  07/05/19 0250     WBC 9.58 10*3/mm3      RBC 3.82 10*6/mm3      Hemoglobin 12.4 g/dL      Hematocrit 37.1 %      MCV 97.1 fL      MCH 32.5 pg      MCHC 33.4 g/dL      RDW 13.4 %      RDW-SD 48.5 fl      MPV 10.5 fL      Platelets 254 10*3/mm3      Neutrophil % 57.9 %      Lymphocyte % 26.3 %      Monocyte % 8.9 %      Eosinophil % 5.9 %      Basophil % 0.8 %      Immature Grans % 0.2 %      Neutrophils, Absolute 5.54 10*3/mm3      Lymphocytes, Absolute 2.52 10*3/mm3      Monocytes, Absolute 0.85 10*3/mm3      Eosinophils, Absolute 0.57 10*3/mm3      Basophils, Absolute 0.08 10*3/mm3      Immature Grans, Absolute 0.02 10*3/mm3      nRBC 0.0 /100 WBC         Imaging Results (last 24 hours)     Procedure Component Value Units Date/Time    CT Chest With Contrast [[id number]] Updated:  07/05/19 0347    CT Head Without Contrast [[id number]] Updated:  07/05/19 0347    CT Cervical Spine Without Contrast [[id number]] Updated:  07/05/19 0347    CT Thoracic Spine Without Contrast [[id number]] Updated:  07/05/19 0347    CT Lumbar Spine Without Contrast [[id number]] Updated:  07/05/19 0347        I have personally reviewed and interpreted the radiology studies and ECG obtained at time of admission.     Assessment / Plan     Assessment:   Active Hospital Problems    Diagnosis   • **Syncope   • Laceration of head   • Chronic respiratory failure with hypoxia (CMS/HCC)   1.  Syncope in someone with a known PFO plan is to admit serial neuro checks MRI of the brain.  We will also obtain a 2D echo with agitated saline as well as carotid ultrasound.  Patient will be monitored on telemetry and serial enzymes EKGs will be obtained.  She probably also needs a cardiology consultation  because if she does have this PFO and right-to-left shunt she very likely could have flipped a piece of black or clot over to the left side and cause this.  Which would indicate need for closure.  I am going to hold off on initiation of aspirin therapy due to her size and significant laceration.  Might want to make sure it is not in a bleed.  We will also check lipid panel.  2.  Head laceration after fall per recommendations of the ER and going to get neurosurgery to check the wound to make sure that it it is suitably sutured.  3.  COPD requiring oxygen continue bronchodilator therapy and supplemental oxygen.        Patient seen after midnight         Code Status: Full     I discussed the patient's findings and my recommendations with the patient and several other family members.  Her  is her healthcare power surrogate should she not be able to speak for herself    Estimated length of stay 1 to 2 days    Kb Escalona MD   07/05/19   5:53 AM

## 2019-07-06 VITALS
DIASTOLIC BLOOD PRESSURE: 71 MMHG | SYSTOLIC BLOOD PRESSURE: 121 MMHG | TEMPERATURE: 98.2 F | HEIGHT: 63 IN | BODY MASS INDEX: 30.55 KG/M2 | WEIGHT: 172.4 LBS | RESPIRATION RATE: 20 BRPM | HEART RATE: 105 BPM | OXYGEN SATURATION: 92 %

## 2019-07-06 PROCEDURE — 94799 UNLISTED PULMONARY SVC/PX: CPT

## 2019-07-06 PROCEDURE — G0378 HOSPITAL OBSERVATION PER HR: HCPCS

## 2019-07-06 PROCEDURE — 94760 N-INVAS EAR/PLS OXIMETRY 1: CPT

## 2019-07-06 PROCEDURE — 93005 ELECTROCARDIOGRAM TRACING: CPT | Performed by: EMERGENCY MEDICINE

## 2019-07-06 PROCEDURE — 93010 ELECTROCARDIOGRAM REPORT: CPT | Performed by: INTERNAL MEDICINE

## 2019-07-06 RX ORDER — CYCLOBENZAPRINE HCL 10 MG
5 TABLET ORAL 3 TIMES DAILY PRN
Status: DISCONTINUED | OUTPATIENT
Start: 2019-07-06 | End: 2019-07-06 | Stop reason: HOSPADM

## 2019-07-06 RX ORDER — OXYCODONE AND ACETAMINOPHEN 7.5; 325 MG/1; MG/1
1 TABLET ORAL EVERY 6 HOURS PRN
Qty: 12 TABLET | Refills: 0 | Status: SHIPPED | OUTPATIENT
Start: 2019-07-06 | End: 2019-07-15

## 2019-07-06 RX ORDER — CYCLOBENZAPRINE HCL 5 MG
5 TABLET ORAL 3 TIMES DAILY PRN
Qty: 15 TABLET | Refills: 1 | Status: SHIPPED | OUTPATIENT
Start: 2019-07-06 | End: 2020-01-16 | Stop reason: ALTCHOICE

## 2019-07-06 RX ADMIN — OXYCODONE HYDROCHLORIDE AND ACETAMINOPHEN 1 TABLET: 7.5; 325 TABLET ORAL at 06:21

## 2019-07-06 RX ADMIN — IPRATROPIUM BROMIDE AND ALBUTEROL SULFATE 3 ML: 2.5; .5 SOLUTION RESPIRATORY (INHALATION) at 06:19

## 2019-07-06 RX ADMIN — OXYCODONE HYDROCHLORIDE AND ACETAMINOPHEN 1 TABLET: 7.5; 325 TABLET ORAL at 11:54

## 2019-07-06 RX ADMIN — BUDESONIDE AND FORMOTEROL FUMARATE DIHYDRATE 2 PUFF: 160; 4.5 AEROSOL RESPIRATORY (INHALATION) at 06:19

## 2019-07-06 RX ADMIN — IPRATROPIUM BROMIDE AND ALBUTEROL SULFATE 3 ML: 2.5; .5 SOLUTION RESPIRATORY (INHALATION) at 10:18

## 2019-07-06 RX ADMIN — SODIUM CHLORIDE, PRESERVATIVE FREE 3 ML: 5 INJECTION INTRAVENOUS at 08:32

## 2019-07-06 RX ADMIN — IPRATROPIUM BROMIDE AND ALBUTEROL SULFATE 3 ML: 2.5; .5 SOLUTION RESPIRATORY (INHALATION) at 02:40

## 2019-07-06 RX ADMIN — CYCLOBENZAPRINE 5 MG: 10 TABLET, FILM COATED ORAL at 12:40

## 2019-07-06 NOTE — DISCHARGE SUMMARY
[facility]  DISCHARGE SUMMARY       Date of Admission: 7/5/2019  Date of Discharge:  7/6/2019  Primary Care Physician: Sonu Lennon MD    Presenting Problem/History of Present Illness:  Syncope, head laceration.     Final Discharge Diagnoses:  Active Hospital Problems    Diagnosis   • **Syncope   • Laceration of head   • Chronic respiratory failure with hypoxia (CMS/HCC)   • Pulmonary fibrosis (CMS/HCC)   • Stage 4 very severe COPD by GOLD classification (CMS/HCC)     Consults:   1. Dr. Arguelles with neurosurgery inspected her laceration repair.   2. Dr. Kendrick with pulmonary at the patient's request.     Procedures Performed: Layered laceration repair by Dr. Perez in the ER.     Pertinent Test Results:   Imaging Results (last 7 days)     Procedure Component Value Units Date/Time    MRI Brain Without Contrast [[id number]] Collected:  07/05/19 1328     Updated:  07/05/19 1340    Narrative:       EXAMINATION: MRI BRAIN WO CONTRAST- 7/5/2019 1:28 PM CDT     HISTORY: Syncope, fainting     COMPARISON: 07/05/2019     Technical: Multiplanar, multisequence imaging was performed through the  brain without the use of IV contrast.     FINDINGS:  There is no diffusion restriction to suggest acute ischemia.     There is normal-appearing anatomy at the craniocervical junction. The  pituitary gland is grossly normal in appearance.     There is no evidence of acute intracranial hemorrhage or midline shift.     There is a large left scalp hematoma with associated laceration.     The ventricles appear normal in configuration. Normal signal void is  seen in the carotid and basilar arteries.     There is some fluid in the sphenoid sinuses. Paranasal sinuses and  mastoid air cells otherwise appear clear. The visualized globes and  orbits are unremarkable.     There are a few scattered periventricular and subcortical FLAIR signal  hyperintensities, a nonspecific finding most often  seen as sequela of  chronic microvascular ischemia.       Impression:          1. Left scalp laceration with large hematoma.  2. No acute intracranial findings.  3. FLAIR signal abnormalities in the white matter which are nonspecific  but most often seen as sequela of chronic microvascular ischemia.  This report was finalized on 07/05/2019 13:37 by Dr. Ishan Jim MD.    US Carotid Bilateral [[id number]] Updated:  07/05/19 1243    CT Lumbar Spine Without Contrast [[id number]] Collected:  07/05/19 0719     Updated:  07/05/19 0724    Narrative:       EXAMINATION: CT LUMBAR SPINE WITHOUT IV CONTRAST 07/05/2019      COMPARISON: None.     HISTORY: Female, 68 years-old. FALL     TECHNIQUE: Multiple CT images were obtained of the lumbar spine without  IV contrast. The images were formatted in the axial, coronal and  sagittal planes.     Radiation dose equals DLP 1032 mGy-cm.  Automated exposure control dose  reduction technique was implemented.     FINDINGS:      Preservation of the normal lordosis of the lumbar spine.No acute  compression deformity. Subtle grade 1 anterolisthesis of L4 on L5  without pars defects identified. No abnormal soft tissue density within  the spinal canal.Mild disc height loss at multiple levels, worst at  L5-S1.The prevertebral and paraspinal soft tissues are unremarkable.          Impression:       No acute posttraumatic osseous finding.        This report was finalized on 07/05/2019 07:21 by Dr. Anita Nagel MD.    CT Thoracic Spine Without Contrast [[id number]] Collected:  07/05/19 0717     Updated:  07/05/19 0722    Narrative:       EXAMINATION:      CT thoracic spine without  contrast      DATE:   07/05/2019      HISTORY:  Fall     COMPARISON:  None     TECHNIQUE:  Routine non contrast of the thoracic spine were obtained.  The images were formatted in the axial, coronal and sagittal planes.     Radiation dose equals  mGy-cm.  Automated exposure control dose  reduction technique  was implemented.        FINDINGS:       THORACIC.  The thoracic spine is normal in position and alignment. No  acute compression deformity. No dislocation. No abnormal soft tissue  density within the spinal canal. Multilevel degenerative disc disease  with few Schmorl's nodes identified.     Emphysema.       Impression:       No acute osseous posttraumatic finding.  This report was finalized on 07/05/2019 07:19 by Dr. Anita Nagel MD.    CT Head Without Contrast [[id number]] Collected:  07/05/19 0713     Updated:  07/05/19 0721    Narrative:       EXAM:   CT OF THE HEAD WITHOUT IV CONTRAST   CT CERVICAL SPINE WITHOUT IV CONTRAST 07/05/2019     COMPARISON: None.      INDICATION: Trauma      PROCEDURE: Non contrast enhanced head CT and cervical spine CT were  performed. The head images were formatted in the axial plane at 5 mm  thick intervals. The cervical spine images were formatted in the axial,  coronal and sagittal planes.           FINDINGS:   HEAD: Left superficial scalp hematoma and laceration with radiopaque  foreign body present. Ventricles and cerebrospinal fluid spaces are  normal in size and configuration for the patient's age. There is no  evidence of mass-effect or midline shift. The gray-white differentiation  is preserved..  There is no evidence of intracranial contusion,  hemorrhage, or skull fracture.  The visualized portions of the paranasal  sinuses and mastoid air cells are unremarkable.     CERVICAL SPINE: The odontoid process is well approximated with the  anterior body of C1 and well aligned between the lateral masses of C1.  Subtle grade 1 anterolisthesis of C3 on C4. No acute compression  deformity. No dislocation. Well corticated calcific density along the  inferior endplate of C5 is favored to be chronic. There is no acute  fracture or dislocation. There is no paraspinal hematoma. Multilevel  degenerative changes with disc disease and facet arthropathy.          Impression:       CT  head. Left superficial scalp hematoma and laceration with radiopaque  foreign body. No associated calvarial fracture or acute intracranial  abnormalities.     CT cervical spine. No acute osseous posttraumatic finding.  This report was finalized on 07/05/2019 07:17 by Dr. Anita Nagel MD.    CT Cervical Spine Without Contrast [[id number]] Collected:  07/05/19 0713     Updated:  07/05/19 0721    Narrative:       EXAM:   CT OF THE HEAD WITHOUT IV CONTRAST   CT CERVICAL SPINE WITHOUT IV CONTRAST 07/05/2019     COMPARISON: None.      INDICATION: Trauma      PROCEDURE: Non contrast enhanced head CT and cervical spine CT were  performed. The head images were formatted in the axial plane at 5 mm  thick intervals. The cervical spine images were formatted in the axial,  coronal and sagittal planes.           FINDINGS:   HEAD: Left superficial scalp hematoma and laceration with radiopaque  foreign body present. Ventricles and cerebrospinal fluid spaces are  normal in size and configuration for the patient's age. There is no  evidence of mass-effect or midline shift. The gray-white differentiation  is preserved..  There is no evidence of intracranial contusion,  hemorrhage, or skull fracture.  The visualized portions of the paranasal  sinuses and mastoid air cells are unremarkable.     CERVICAL SPINE: The odontoid process is well approximated with the  anterior body of C1 and well aligned between the lateral masses of C1.  Subtle grade 1 anterolisthesis of C3 on C4. No acute compression  deformity. No dislocation. Well corticated calcific density along the  inferior endplate of C5 is favored to be chronic. There is no acute  fracture or dislocation. There is no paraspinal hematoma. Multilevel  degenerative changes with disc disease and facet arthropathy.          Impression:       CT head. Left superficial scalp hematoma and laceration with radiopaque  foreign body. No associated calvarial fracture or acute  intracranial  abnormalities.     CT cervical spine. No acute osseous posttraumatic finding.  This report was finalized on 07/05/2019 07:17 by Dr. Anita Nagel MD.    CT Chest With Contrast [[id number]] Collected:  07/05/19 0710     Updated:  07/05/19 0716    Narrative:       CT CHEST W CONTRAST- 7/5/2019 3:16 AM CDT     HISTORY: TRAUMA      COMPARISON: CT chest dated 03/12/2019      DLP: 519 mGy cm     TECHNIQUE: Serial helical tomographic images of the chest were acquired  following the infusion of Isovue contrast intravenously. Bone and soft  tissue algorithms were provided.       FINDINGS:   Neck base: The imaged portion of the neck and thyroid gland is  unremarkable.      Lungs: Emphysema. Bilateral jugular nodular opacities, which may reflect  atypical infection the proper clinical setting. No pneumothorax. No  pleural effusion is present. The trachea and bronchial tree are patent.      Heart: Normal in size and appearance. There is no pericardial effusion.      Vasculature: Aorta is normal in caliber and appearance without  significant atherosclerosis, stenosis, or aneurysm. Calcifications are  seen in the coronary arteries. There is atherosclerosis in the great  vessels without definite stenosis. The pulmonary arteries are enlarged,  suggestive of pulmonary arterial hypertension.     Lymph nodes: No enlarged axillary, hilar, or mediastinal lymph nodes.      Bones and soft tissues: No acute osseous or soft tissue abnormality is  seen. There are no worrisome osseous lesions.      Upper abdomen: No acute pathology. Hepatosteatosis.        Impression:       1.   No acute posttraumatic finding.  2.  Reticulonodular opacities in the lower lobes bilaterally and in the  lingula. Consider atypical infection the proper clinical setting..        This report was finalized on 07/05/2019 07:13 by Dr. Anita Nagel MD.        Results for orders placed during the hospital encounter of 07/05/19   Adult Transthoracic Echo  Complete With Contrast if Necessary Per Protocol (With Agitated Saline)    Narrative · Estimated EF = 55%.  · Left ventricular diastolic dysfunction.  · No evidence of pulmonary hypertension is present.  · No evidence of a patent foramen ovale. Saline test results are negative.  · Right ventricular cavity is moderately dilated.  · Mildly reduced right ventricular systolic function noted.  · RV to LV ratio >1        Lab Results (last 7 days)     Procedure Component Value Units Date/Time    Troponin [[id number]]  (Normal) Collected:  07/05/19 0847    Specimen:  Blood Updated:  07/05/19 0916     Troponin I <0.012 ng/mL     Folate [[id number]]  (Normal) Collected:  07/05/19 0641    Specimen:  Blood Updated:  07/05/19 0835     Folate 9.90 ng/mL     Vitamin B12 [[id number]]  (Normal) Collected:  07/05/19 0641    Specimen:  Blood Updated:  07/05/19 0835     Vitamin B-12 576 pg/mL     Troponin [[id number]]  (Normal) Collected:  07/05/19 0641    Specimen:  Blood Updated:  07/05/19 0723     Troponin I <0.012 ng/mL     Lipid Panel [[id number]] Collected:  07/05/19 0641    Specimen:  Blood Updated:  07/05/19 0721     Total Cholesterol 172 mg/dL      Triglycerides 80 mg/dL      HDL Cholesterol 93 mg/dL      LDL Cholesterol  64 mg/dL      LDL/HDL Ratio 0.68    C-reactive Protein [[id number]]  (Normal) Collected:  07/05/19 0641    Specimen:  Blood Updated:  07/05/19 0713     C-Reactive Protein 0.51 mg/dL     Sedimentation Rate [[id number]]  (Normal) Collected:  07/05/19 0641    Specimen:  Blood Updated:  07/05/19 0709     Sed Rate 11 mm/hr     Troponin [[id number]]  (Normal) Collected:  07/05/19 0155    Specimen:  Blood Updated:  07/05/19 0455     Troponin I <0.012 ng/mL     BNP [[id number]]  (Normal) Collected:  07/05/19 0155    Specimen:  Blood Updated:  07/05/19 0455     proBNP 172.0 pg/mL     Comprehensive Metabolic Panel [[id number]]  (Abnormal) Collected:  07/05/19 0155    Specimen:  Blood Updated:  07/05/19 0258     Glucose 116 mg/dL       BUN 8 mg/dL      Creatinine 0.37 mg/dL      Sodium 136 mmol/L      Potassium 4.3 mmol/L      Chloride 94 mmol/L      CO2 30.0 mmol/L      Calcium 8.9 mg/dL      Total Protein 7.4 g/dL      Albumin 4.30 g/dL      ALT (SGPT) 20 U/L      AST (SGOT) 49 U/L      Alkaline Phosphatase 46 U/L      Total Bilirubin 0.4 mg/dL      eGFR Non African Amer >150 mL/min/1.73      Globulin 3.1 gm/dL      A/G Ratio 1.4 g/dL      BUN/Creatinine Ratio 21.6     Anion Gap 12.0 mmol/L     Narrative:       GFR Normal >60  Chronic Kidney Disease <60  Kidney Failure <15    aPTT [[id number]]  (Normal) Collected:  07/05/19 0155    Specimen:  Blood Updated:  07/05/19 0256     PTT 30.0 seconds     Protime-INR [[id number]]  (Abnormal) Collected:  07/05/19 0155    Specimen:  Blood Updated:  07/05/19 0256     Protime 12.0 Seconds      INR 0.86    CBC Auto Differential [[id number]]  (Abnormal) Collected:  07/05/19 0155    Specimen:  Blood Updated:  07/05/19 0250     WBC 9.58 10*3/mm3      RBC 3.82 10*6/mm3      Hemoglobin 12.4 g/dL      Hematocrit 37.1 %      MCV 97.1 fL      MCH 32.5 pg      MCHC 33.4 g/dL      RDW 13.4 %      RDW-SD 48.5 fl      MPV 10.5 fL      Platelets 254 10*3/mm3      Neutrophil % 57.9 %      Lymphocyte % 26.3 %      Monocyte % 8.9 %      Eosinophil % 5.9 %      Basophil % 0.8 %      Immature Grans % 0.2 %      Neutrophils, Absolute 5.54 10*3/mm3      Lymphocytes, Absolute 2.52 10*3/mm3      Monocytes, Absolute 0.85 10*3/mm3      Eosinophils, Absolute 0.57 10*3/mm3      Basophils, Absolute 0.08 10*3/mm3      Immature Grans, Absolute 0.02 10*3/mm3      nRBC 0.0 /100 WBC         Hospital Course:  She was seen after midnight on 7/5 by Dr. Escalona.  His notes were reviewed. I also reviewed Dr. Perez's notes from the emergency department. He had to repair a complex laceration of her left scalp over the temporoparietal area.  Dr. Arguelles checked on this as well at Dr. Escalona's request.  He called me and felt that the repair was done  very well. She does have a hematoma and some bruising.     The patient had a syncopal episode in her kitchen while trying to get ready for bed.  She tells me that her family had been over and they had all had dinner together.  They did fireworks outside and then she came in to get ready for bed.  She does not remember anything about the event of passing out.  She woke up with blood on the floor and called her daughter who had her transported here.  She does not claim any dizziness or near syncope prior to the event.  She did not become overheated while being outside last night.     Her telemetry has been uneventful.  She occasionally has some low-grade sinus tachycardia. She has been in sinus rhythm the entire time that she has been here.  Orthostatic vital signs have been negative.  Dr. Escalona got a carotid duplex examination which shows no significant stenosis or abnormal flow. There are no significant problems on echocardiogram.  She had an MRI that failed to show a stroke.  She is intact neurologically.     She had some transient feelings of dizziness and nausea yesterday.  She attributed that to the pain medication.  These symptoms have resolved today.  She has not been orthostatic.       She does give history that she has been more short of breath than her baseline over the last week.  However, she has clear lungs on exam and is on her home dose of oxygen.  She is due to have a repeat CT scan of her chest as an outpatient next week with Dr. Kendrick.  However, a CT of the chest was obtained in the emergency department last night showed reticulonodular opacities in the lower lobes bilaterally and in the lingula. These are likely chronic findings. She very much wanted to see Dr. Kendrick as a courtesy prior to going home.  He saw her on the afternoon of 7/5.  He had no new recommendations.  He will cancel her upcoming CT as an outpatient to use the one that was done here in the emergency department.  She actually has  "an appointment to see him the week after next.    She is doing quite well.  Her only complaint today is some spasms, which is a chronic problem for her.  We will write a small dose of Flexeril and try dose here in the hospital.  She otherwise can follow with primary care this week and then keep regular scheduled follow-up with pulmonology and neurology as an outpatient.    Physical Exam on Discharge:  /71 (BP Location: Right arm, Patient Position: Lying)   Pulse 105   Temp 98.2 °F (36.8 °C) (Oral)   Resp 20   Ht 160 cm (63\")   Wt 78.2 kg (172 lb 6.4 oz)   SpO2 92%   BMI 30.54 kg/m²   Physical Exam   Constitutional: She is oriented to person, place, and time. She appears well-developed and well-nourished.   Up in bed.  No distress.  No family present.  Discussed with her nurse, Michelle.   HENT:   Head: Normocephalic.   She has some swelling and bruising around her laceration of the left temporoparietal area.  Well sutured and clean.  The sutures are dissolvable.   Eyes: Conjunctivae and EOM are normal. Pupils are equal, round, and reactive to light.   Neck: Neck supple. No JVD present.   Cardiovascular: Normal rate, regular rhythm, normal heart sounds and intact distal pulses. Exam reveals no gallop and no friction rub.   No murmur heard.  Pulmonary/Chest: Effort normal and breath sounds normal. No respiratory distress. She has no wheezes. She has no rales. She exhibits no tenderness.   On 3 L nasal cannula, which is her home dose.   Abdominal: Soft. Bowel sounds are normal. She exhibits no distension. There is no tenderness. There is no rebound and no guarding.   Musculoskeletal: Normal range of motion. She exhibits no edema, tenderness or deformity.   Neurological: She is alert and oriented to person, place, and time. She displays normal reflexes. No cranial nerve deficit. She exhibits normal muscle tone.   Skin: Skin is warm and dry. No rash noted.   Psychiatric: She has a normal mood and affect. Her " behavior is normal. Judgment and thought content normal.     Condition on Discharge: Good.     Discharge Disposition:  Home or Self Care    Discharge Medications:     Discharge Medications      New Medications      Instructions Start Date   cyclobenzaprine 5 MG tablet  Commonly known as:  FLEXERIL   5 mg, Oral, 3 Times Daily PRN      oxyCODONE-acetaminophen 7.5-325 MG per tablet  Commonly known as:  PERCOCET   1 tablet, Oral, Every 6 Hours PRN         Continue These Medications      Instructions Start Date   acetaminophen 500 MG tablet  Commonly known as:  TYLENOL   500 mg, Oral, Every 6 Hours PRN      albuterol sulfate  (90 Base) MCG/ACT inhaler  Commonly known as:  PROVENTIL HFA;VENTOLIN HFA;PROAIR HFA   2 puffs, Inhalation, Every 4 Hours PRN      Biotin 85226 MCG tablet   1,000 mcg, Oral, Daily      budesonide-formoterol 160-4.5 MCG/ACT inhaler  Commonly known as:  SYMBICORT   2 puffs, Inhalation, 2 Times Daily - RT      MAGNESIUM PO   400 mg, Oral, Daily      tiotropium 18 MCG per inhalation capsule  Commonly known as:  SPIRIVA   1 capsule, Inhalation, Daily - RT           Discharge Diet:   Diet Instructions     Diet: Regular; Thin      Discharge Diet:  Regular    Fluid Consistency:  Thin        Activity at Discharge:   Activity Instructions     Up WIth Assist          Follow-up Appointments:   Dr. Lennon in 1 week.     Future Appointments   Date Time Provider Department Center   7/15/2019  2:00 PM PAD [facility] CT 1 BH PAD CT BI [facility]   7/18/2019  3:30 PM Ar Kendrick MD [facility] PAD None     Test Results Pending at Discharge: None.     Cuba Velázquez DO  07/06/19  12:36 PM    Time: 25 minutes.

## 2019-07-06 NOTE — PLAN OF CARE
Problem: Patient Care Overview  Goal: Plan of Care Review  Outcome: Ongoing (interventions implemented as appropriate)   07/06/19 0633   Coping/Psychosocial   Plan of Care Reviewed With patient   Plan of Care Review   Progress improving   OTHER   Outcome Summary Pt c/o pain r/t scalp lac; medicated as ordered. Pt should d/c home today.       Problem: Fall Risk (Adult)  Goal: Absence of Fall  Outcome: Ongoing (interventions implemented as appropriate)   07/06/19 0633   Fall Risk (Adult)   Absence of Fall achieves outcome       Problem: Pain, Chronic (Adult)  Goal: Acceptable Pain/Comfort Level and Functional Ability  Outcome: Ongoing (interventions implemented as appropriate)   07/06/19 0633   Pain, Chronic (Adult)   Acceptable Pain/Comfort Level and Functional Ability making progress toward outcome

## 2019-07-18 ENCOUNTER — OFFICE VISIT (OUTPATIENT)
Dept: PULMONOLOGY | Facility: CLINIC | Age: 69
End: 2019-07-18

## 2019-07-18 VITALS
WEIGHT: 172 LBS | HEART RATE: 108 BPM | BODY MASS INDEX: 30.48 KG/M2 | OXYGEN SATURATION: 90 % | DIASTOLIC BLOOD PRESSURE: 80 MMHG | SYSTOLIC BLOOD PRESSURE: 140 MMHG | HEIGHT: 63 IN

## 2019-07-18 DIAGNOSIS — J84.115 RESPIRATORY BRONCHIOLITIS INTERSTITIAL LUNG DISEASE (HCC): ICD-10-CM

## 2019-07-18 DIAGNOSIS — J96.11 CHRONIC RESPIRATORY FAILURE WITH HYPOXIA (HCC): ICD-10-CM

## 2019-07-18 DIAGNOSIS — J84.10 PULMONARY FIBROSIS (HCC): ICD-10-CM

## 2019-07-18 DIAGNOSIS — J44.9 STAGE 4 VERY SEVERE COPD BY GOLD CLASSIFICATION (HCC): Primary | ICD-10-CM

## 2019-07-18 PROCEDURE — 99213 OFFICE O/P EST LOW 20 MIN: CPT | Performed by: INTERNAL MEDICINE

## 2019-07-18 RX ORDER — CYCLOBENZAPRINE HCL 5 MG
TABLET ORAL
COMMUNITY
End: 2020-01-16 | Stop reason: ALTCHOICE

## 2019-07-18 RX ORDER — SULFAMETHOXAZOLE AND TRIMETHOPRIM 800; 160 MG/1; MG/1
TABLET ORAL
Refills: 1 | COMMUNITY
Start: 2019-07-15 | End: 2020-01-16 | Stop reason: ALTCHOICE

## 2019-07-18 RX ORDER — HYDROXYZINE HYDROCHLORIDE 25 MG/1
TABLET, FILM COATED ORAL
COMMUNITY
End: 2020-01-16 | Stop reason: ALTCHOICE

## 2019-07-18 NOTE — PROGRESS NOTES
Subjective   Joana Cuevas is a 68 y.o. female.     Background: Pt with copd , pulmonary fibrosis, chronic respiratory failure with hypoxia, RBILD.  FEV1 32% predicted 2017 with normal dlco.  She has had prior admission with severe acute respiratory failure.  Hypoxic response to exercise with tachycardia on 6MWT.  Pr  ior hx interstitial and nodular infiltrates 2016    Chief Complaint   Patient presents with   • f/u copd        History of Present Illness   She has been doing better.  She uses o2 all the time and she is benefiting from it.  Inhalers are helpful.  Sat is 94 at home.    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   has a past surgical history that includes Hysterectomy; Mastectomy; and Tonsilectomy, adenoidectomy, bilateral myringotomy and tubes.  family history includes Cancer in her mother; Diabetes in her mother; Heart failure in her father.   reports that she quit smoking about 4 years ago. Her smoking use included cigarettes. She has a 70.00 pack-year smoking history. She has never used smokeless tobacco. She reports that she drinks alcohol. She reports that she does not use drugs.  No Known Allergies  Medications    Current Outpatient Medications:   •  albuterol sulfate  (90 Base) MCG/ACT inhaler, Inhale 2 puffs Every 4 (Four) Hours As Needed for Wheezing or Shortness of Air., Disp: , Rfl:   •  Biotin 51536 MCG tablet, Take 1,000 mcg by mouth Daily., Disp: , Rfl:   •  budesonide-formoterol (SYMBICORT) 160-4.5 MCG/ACT inhaler, Inhale 2 puffs 2 (Two) Times a Day., Disp: , Rfl:   •  cyclobenzaprine (FLEXERIL) 5 MG tablet, Take 1 tablet by mouth 3 (Three) Times a Day As Needed for Muscle Spasms., Disp: 15 tablet, Rfl: 1  •  cyclobenzaprine (FLEXERIL) 5 MG tablet, cyclobenzaprine 5 mg tablet  three times daily as needed, Disp: , Rfl:   •  hydrOXYzine (ATARAX) 25 MG tablet,  "hydroxyzine HCl 25 mg tablet  Take 1 tablet as needed by oral route at bedtime., Disp: , Rfl:   •  MAGNESIUM PO, Take 400 mg by mouth Daily., Disp: , Rfl:   •  sulfamethoxazole-trimethoprim (BACTRIM DS,SEPTRA DS) 800-160 MG per tablet, TAKE 1 TABLET BY MOUTH EVERY 12 HOURS FOR 7 DAYS, Disp: , Rfl: 1  •  tiotropium (SPIRIVA) 18 MCG per inhalation capsule, Place 1 capsule into inhaler and inhale Daily., Disp: , Rfl:   •  acetaminophen (TYLENOL) 500 MG tablet, Take 500 mg by mouth Every 6 (Six) Hours As Needed for Mild Pain ., Disp: , Rfl:     Review of Systems   Constitutional: Negative for chills and fever.   Cardiovascular: Negative for chest pain.   Gastrointestinal: Negative for nausea and vomiting.   Psychiatric/Behavioral: The patient is nervous/anxious.      Objective   /80   Pulse 108   Ht 160 cm (63\")   Wt 78 kg (172 lb)   SpO2 90% Comment: 3l  Breastfeeding? No   BMI 30.47 kg/m²   Physical Exam   Constitutional: She appears well-developed. She does not appear ill. No distress.   HENT:   Head: Atraumatic.   Nose: Nose normal.   Eyes: Conjunctivae and EOM are normal. No scleral icterus.   Neck: Neck supple.   Cardiovascular: Normal rate, regular rhythm, S1 normal and S2 normal.   Pulmonary/Chest: Effort normal. She has wheezes. She has no rales.   Abdominal: Soft. She exhibits no distension. There is no tenderness.   Musculoskeletal: She exhibits no deformity.   Neurological: She is alert.   Skin: Skin is warm. No rash noted.   Psychiatric: She has a normal mood and affect.     -----------------------------------------------------------------------------------------------  Recent Imaging:  Ct Head Without Contrast    Result Date: 7/5/2019  Impression: CT head. Left superficial scalp hematoma and laceration with radiopaque foreign body. No associated calvarial fracture or acute intracranial abnormalities.  CT cervical spine. No acute osseous posttraumatic finding. This report was finalized on " 07/05/2019 07:17 by Dr. Anita Nagel MD.    Ct Chest With Contrast    Result Date: 7/5/2019  Impression: 1.   No acute posttraumatic finding. 2.  Reticulonodular opacities in the lower lobes bilaterally and in the lingula. Consider atypical infection the proper clinical setting..   This report was finalized on 07/05/2019 07:13 by Dr. Anita Nagel MD.    Ct Cervical Spine Without Contrast    Result Date: 7/5/2019  Impression: CT head. Left superficial scalp hematoma and laceration with radiopaque foreign body. No associated calvarial fracture or acute intracranial abnormalities.  CT cervical spine. No acute osseous posttraumatic finding. This report was finalized on 07/05/2019 07:17 by Dr. Anita Nagel MD.    Ct Thoracic Spine Without Contrast    Result Date: 7/5/2019  Impression: No acute osseous posttraumatic finding. This report was finalized on 07/05/2019 07:19 by Dr. Anita Nagel MD.    Ct Lumbar Spine Without Contrast    Result Date: 7/5/2019  Impression: No acute posttraumatic osseous finding.   This report was finalized on 07/05/2019 07:21 by Dr. Anita Nagel MD.    Mri Brain Without Contrast    Result Date: 7/5/2019  Impression:  1. Left scalp laceration with large hematoma. 2. No acute intracranial findings. 3. FLAIR signal abnormalities in the white matter which are nonspecific but most often seen as sequela of chronic microvascular ischemia. This report was finalized on 07/05/2019 13:37 by Dr. Ishan Jim MD.    Us Carotid Bilateral    Result Date: 7/7/2019  Impression: Impression: 1. There is less than 50% stenosis of the right internal carotid artery. 2. There is less than 50% stenosis of the left internal carotid artery. 3. Antegrade flow is demonstrated in bilateral vertebral arteries.  Comments: Bilateral carotid vertebral arterial duplex scan was performed.  Grayscale imaging shows intimal thickening and calcified elements at the carotid bifurcation. The right internal carotid  artery peak systolic velocity is 108 cm/sec. The end-diastolic velocity is 34.4 cm/sec. The right ICA/CCA ratio is approximately 1.36 . These findings correlate with less than 50% stenosis of the right internal carotid artery.  Grayscale imaging shows intimal thickening and calcified elements at the carotid bifurcation. The left internal carotid artery peak systolic velocity is 89.9 cm/sec. The end-diastolic velocity is 32.3 cm/sec. The left ICA/CCA ratio is approximately 1.02 . These findings correlate with less than 50% stenosis of the left internal carotid artery.  Antegrade flow is demonstrated in bilateral vertebral arteries.  This report was finalized on 07/07/2019 10:02 by Dr. Ashutosh Virgen MD.    -----------------------------------------------------------------------------------------------  Pulmonary Functions Testing Results:  FEV1   Date Value Ref Range Status   04/17/2019 36% liters Final     FVC   Date Value Ref Range Status   04/17/2019 68% liters Final     FEV1/FVC   Date Value Ref Range Status   04/17/2019 42.15% % Final     DLCO   Date Value Ref Range Status   04/17/2019 42% ml/mmHg sec Final      -----------------------------------------------------------------------------------------------  Assessment/Plan   Problem List Items Addressed This Visit        Respiratory    Stage 4 very severe COPD by GOLD classification (CMS/HCC) - Primary    Respiratory bronchiolitis interstitial lung disease (CMS/HCC)    Chronic respiratory failure with hypoxia (CMS/[facility])    Pulmonary fibrosis (CMS/[facility])        Patient's Body mass index is 30.47 kg/m². BMI is within normal parameters. No follow-up required..    She is doing as well as we can expect.  Main concern is access to meds due to cost.  Continue spiriva, symbicort       Electronically signed by Ar Kendrick MD, 7/18/2019, 4:36 PM

## 2019-08-20 LAB
ALBUMIN SERPL-MCNC: 4.6 G/DL (ref 3.5–5.2)
ALP BLD-CCNC: 54 U/L (ref 35–104)
ALT SERPL-CCNC: 17 U/L (ref 5–33)
ANION GAP SERPL CALCULATED.3IONS-SCNC: 16 MMOL/L (ref 7–19)
AST SERPL-CCNC: 22 U/L (ref 5–32)
BASOPHILS ABSOLUTE: 0 K/UL (ref 0–0.2)
BASOPHILS RELATIVE PERCENT: 0.5 % (ref 0–1)
BILIRUB SERPL-MCNC: 0.3 MG/DL (ref 0.2–1.2)
BUN BLDV-MCNC: 14 MG/DL (ref 8–23)
CALCIUM SERPL-MCNC: 10.4 MG/DL (ref 8.8–10.2)
CHLORIDE BLD-SCNC: 101 MMOL/L (ref 98–111)
CHOLESTEROL, TOTAL: 207 MG/DL (ref 160–199)
CO2: 27 MMOL/L (ref 22–29)
CREAT SERPL-MCNC: <0.5 MG/DL (ref 0.5–0.9)
EOSINOPHILS ABSOLUTE: 0.3 K/UL (ref 0–0.6)
EOSINOPHILS RELATIVE PERCENT: 4.2 % (ref 0–5)
FERRITIN: 77.8 NG/ML (ref 13–150)
GFR NON-AFRICAN AMERICAN: >60
GLUCOSE BLD-MCNC: 114 MG/DL (ref 74–109)
HCT VFR BLD CALC: 42.3 % (ref 37–47)
HDLC SERPL-MCNC: 116 MG/DL (ref 65–121)
HEMOGLOBIN: 13.3 G/DL (ref 12–16)
IMMATURE GRANULOCYTES #: 0 K/UL
IRON: 65 UG/DL (ref 37–145)
LDL CHOLESTEROL CALCULATED: 80 MG/DL
LYMPHOCYTES ABSOLUTE: 1.5 K/UL (ref 1.1–4.5)
LYMPHOCYTES RELATIVE PERCENT: 20.9 % (ref 20–40)
MAGNESIUM: 1.7 MG/DL (ref 1.6–2.4)
MCH RBC QN AUTO: 31.5 PG (ref 27–31)
MCHC RBC AUTO-ENTMCNC: 31.4 G/DL (ref 33–37)
MCV RBC AUTO: 100.2 FL (ref 81–99)
MONOCYTES ABSOLUTE: 0.6 K/UL (ref 0–0.9)
MONOCYTES RELATIVE PERCENT: 8.7 % (ref 0–10)
NEUTROPHILS ABSOLUTE: 4.8 K/UL (ref 1.5–7.5)
NEUTROPHILS RELATIVE PERCENT: 65.4 % (ref 50–65)
PDW BLD-RTO: 13.4 % (ref 11.5–14.5)
PLATELET # BLD: 262 K/UL (ref 130–400)
PMV BLD AUTO: 10.7 FL (ref 9.4–12.3)
POTASSIUM SERPL-SCNC: 4.3 MMOL/L (ref 3.5–5)
RBC # BLD: 4.22 M/UL (ref 4.2–5.4)
SODIUM BLD-SCNC: 144 MMOL/L (ref 136–145)
TOTAL PROTEIN: 7.7 G/DL (ref 6.6–8.7)
TRIGL SERPL-MCNC: 53 MG/DL (ref 0–149)
TSH SERPL DL<=0.05 MIU/L-ACNC: 1.87 UIU/ML (ref 0.27–4.2)
VITAMIN B-12: 561 PG/ML (ref 211–946)
VITAMIN D 25-HYDROXY: 13.3 NG/ML
WBC # BLD: 7.4 K/UL (ref 4.8–10.8)

## 2019-12-26 ENCOUNTER — TELEPHONE (OUTPATIENT)
Dept: PULMONOLOGY | Facility: CLINIC | Age: 69
End: 2019-12-26

## 2019-12-26 DIAGNOSIS — J44.9 STAGE 4 VERY SEVERE COPD BY GOLD CLASSIFICATION (HCC): Primary | ICD-10-CM

## 2019-12-27 RX ORDER — ALBUTEROL SULFATE 90 UG/1
2 AEROSOL, METERED RESPIRATORY (INHALATION) EVERY 4 HOURS PRN
Qty: 3 INHALER | Refills: 3 | Status: SHIPPED | OUTPATIENT
Start: 2019-12-27 | End: 2020-01-03

## 2019-12-27 RX ORDER — BUDESONIDE AND FORMOTEROL FUMARATE DIHYDRATE 160; 4.5 UG/1; UG/1
AEROSOL RESPIRATORY (INHALATION)
Qty: 3 INHALER | Refills: 3 | Status: SHIPPED | OUTPATIENT
Start: 2019-12-27 | End: 2021-09-23 | Stop reason: SDUPTHER

## 2020-01-03 RX ORDER — ALBUTEROL SULFATE 90 UG/1
2 AEROSOL, METERED RESPIRATORY (INHALATION) EVERY 4 HOURS PRN
Qty: 3 INHALER | Refills: 3 | Status: SHIPPED | OUTPATIENT
Start: 2020-01-03 | End: 2020-04-02

## 2020-01-14 PROBLEM — R55 SYNCOPE: Status: RESOLVED | Noted: 2019-07-05 | Resolved: 2020-01-14

## 2020-01-14 PROBLEM — S01.91XA LACERATION OF HEAD: Status: RESOLVED | Noted: 2019-07-05 | Resolved: 2020-01-14

## 2020-01-16 ENCOUNTER — OFFICE VISIT (OUTPATIENT)
Dept: PULMONOLOGY | Facility: CLINIC | Age: 70
End: 2020-01-16

## 2020-01-16 VITALS
DIASTOLIC BLOOD PRESSURE: 80 MMHG | OXYGEN SATURATION: 95 % | BODY MASS INDEX: 31.01 KG/M2 | SYSTOLIC BLOOD PRESSURE: 120 MMHG | WEIGHT: 175 LBS | HEIGHT: 63 IN | HEART RATE: 105 BPM

## 2020-01-16 DIAGNOSIS — J84.10 PULMONARY FIBROSIS (HCC): ICD-10-CM

## 2020-01-16 DIAGNOSIS — J44.9 STAGE 4 VERY SEVERE COPD BY GOLD CLASSIFICATION (HCC): Primary | ICD-10-CM

## 2020-01-16 DIAGNOSIS — J84.115 RESPIRATORY BRONCHIOLITIS INTERSTITIAL LUNG DISEASE (HCC): ICD-10-CM

## 2020-01-16 DIAGNOSIS — J96.11 CHRONIC RESPIRATORY FAILURE WITH HYPOXIA (HCC): ICD-10-CM

## 2020-01-16 PROCEDURE — 99213 OFFICE O/P EST LOW 20 MIN: CPT | Performed by: INTERNAL MEDICINE

## 2020-01-16 RX ORDER — CLOBETASOL PROPIONATE 0.5 MG/G
CREAM TOPICAL
Status: ON HOLD | COMMUNITY
End: 2022-08-09

## 2020-01-16 NOTE — PROGRESS NOTES
Subjective   Joana Cuevas is a 69 y.o. female.     Background: Pt with copd , pulmonary fibrosis, chronic respiratory failure with hypoxia, RBILD.  FEV1 32% predicted 2017 with normal dlco.  She has had prior admission with severe acute respiratory failure.  Hypoxic response to exercise with tachycardia on 6MWT.  Pr  ior hx interstitial and nodular infiltrates 2016    Chief Complaint   Patient presents with   • stage 4 very severe COPD        History of Present Illness   She says she is doing well.  She can't get the flu shot.  No exacerbations. Pt reports moderate intermittent dyspnea on exertion in chest associated with wheeze and alleviated by inhalers.. She is compliant with and benefiting from the supplemental oxygen.    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   has a past surgical history that includes Hysterectomy; Mastectomy; and Tonsilectomy, adenoidectomy, bilateral myringotomy and tubes.  family history includes Cancer in her mother; Diabetes in her mother; Heart failure in her father.   reports that she quit smoking about 5 years ago. Her smoking use included cigarettes. She has a 70.00 pack-year smoking history. She has never used smokeless tobacco. She reports that she drinks alcohol. She reports that she does not use drugs.  No Known Allergies  Medications    Current Outpatient Medications:   •  acetaminophen (TYLENOL) 500 MG tablet, Take 500 mg by mouth Every 6 (Six) Hours As Needed for Mild Pain ., Disp: , Rfl:   •  albuterol sulfate  (90 Base) MCG/ACT inhaler, Inhale 2 puffs Every 4 (Four) Hours As Needed for Wheezing for up to 90 days. Dispense VENTOLIN brand, Disp: 3 inhaler, Rfl: 3  •  Biotin 13015 MCG tablet, Take 1,000 mcg by mouth Daily., Disp: , Rfl:   •  budesonide-formoterol (SYMBICORT) 160-4.5 MCG/ACT inhaler,  "INHALE 2 PUFFS TWICE DAILY, Disp: 3 inhaler, Rfl: 3  •  clobetasol (TEMOVATE) 0.05 % cream, clobetasol 0.05 % topical cream  APPLY A THIN LAYER TO THE AFFECTED AREA(S) BY TOPICAL ROUTE 2 TIMES PER DAY, Disp: , Rfl:   •  MAGNESIUM PO, Take 400 mg by mouth Daily., Disp: , Rfl:   •  tiotropium (SPIRIVA) 18 MCG per inhalation capsule, Place 1 capsule into inhaler and inhale Daily., Disp: , Rfl:     Review of Systems   Constitutional: Negative for chills and fever.   Cardiovascular: Negative for chest pain.   Gastrointestinal: Negative for nausea and vomiting.     Objective   /80   Pulse 105   Ht 160 cm (63\")   Wt 79.4 kg (175 lb)   SpO2 95% Comment: 3 liters  Breastfeeding No   BMI 31.00 kg/m²   Physical Exam   Constitutional: She appears well-developed.  Non-toxic appearance. She does not appear ill. No distress. Nasal cannula in place.   HENT:   Head: Atraumatic.   Nose: Nose normal.   Eyes: Conjunctivae and EOM are normal. No scleral icterus.   Neck: Neck supple.   Cardiovascular: Normal rate, regular rhythm, S1 normal and S2 normal.   Pulmonary/Chest: Effort normal. No accessory muscle usage. She has wheezes (slight).   Abdominal: Soft. She exhibits no distension. There is no tenderness.   Musculoskeletal: She exhibits no deformity.   Lymphadenopathy:     She has no cervical adenopathy.   Neurological: She is alert.   Skin: Skin is warm. No rash noted.   Psychiatric: She has a normal mood and affect.        -----------------------------------------------------------------------------------------------  PFT Values        Some values may be hidden. Unless noted otherwise, only the newest values recorded on each date are displayed.         Old Values PFT Results 4/17/19   FVC 68%   FEV1 36%   FEV1/FVC 42.15%   DLCO 42%      Pre Drug PFT Results 4/17/19   No data to display.      Post Drug PFT Results 4/17/19   No data to display.      Other Tests PFT Results 4/17/19   No data to display.       "         -----------------------------------------------------------------------------------------------  Assessment/Plan   Problem List Items Addressed This Visit        Pulmonary Problems    Stage 4 very severe COPD by GOLD classification (CMS/HCC) - Primary    Respiratory bronchiolitis interstitial lung disease (CMS/[facility])    Chronic respiratory failure with hypoxia (CMS/[facility])    Pulmonary fibrosis (CMS/[facility])        Patient's Body mass index is 31 kg/m². BMI is above normal parameters. Recommendations include: referral to primary care.    Continue the same  Continue same inhalers       Electronically signed by Ar Kendrick MD, 1/16/2020, 4:05 PM

## 2020-03-25 ENCOUNTER — TELEPHONE (OUTPATIENT)
Dept: PULMONOLOGY | Facility: CLINIC | Age: 70
End: 2020-03-25

## 2020-03-25 RX ORDER — CEFDINIR 300 MG/1
300 CAPSULE ORAL 2 TIMES DAILY
Qty: 14 CAPSULE | Refills: 0 | Status: SHIPPED | OUTPATIENT
Start: 2020-03-25 | End: 2020-04-01

## 2020-03-25 RX ORDER — GUAIFENESIN 600 MG/1
1200 TABLET, EXTENDED RELEASE ORAL 2 TIMES DAILY
Qty: 40 TABLET | Refills: 0 | Status: SHIPPED | OUTPATIENT
Start: 2020-03-25 | End: 2020-04-04

## 2020-03-25 NOTE — TELEPHONE ENCOUNTER
Patient's daughter who is a respiratory therapist states the patient is having increased shortness of air, moderately worse than normal. She has chest congestion with thick yellow sputum. Respirations 24 and sat of 94% on 3L. She is on Spiriva, Symbicort and albuterol. She is asking for a script for antibiotics, steroids and mucinex.  She has been staying in and hasn't had any fevers. Her daughter takes care of all errands. She takes a shower and changes clothes before she come in the house.

## 2020-05-06 NOTE — TELEPHONE ENCOUNTER
Pharmacy sent a request for refills on Spiriva handihaler. Refills sent to [facility].

## 2020-07-16 ENCOUNTER — OFFICE VISIT (OUTPATIENT)
Dept: PULMONOLOGY | Facility: CLINIC | Age: 70
End: 2020-07-16

## 2020-07-16 DIAGNOSIS — J96.11 CHRONIC RESPIRATORY FAILURE WITH HYPOXIA (HCC): ICD-10-CM

## 2020-07-16 DIAGNOSIS — J84.10 PULMONARY FIBROSIS (HCC): ICD-10-CM

## 2020-07-16 DIAGNOSIS — J84.115 RESPIRATORY BRONCHIOLITIS INTERSTITIAL LUNG DISEASE (HCC): ICD-10-CM

## 2020-07-16 DIAGNOSIS — J44.9 STAGE 4 VERY SEVERE COPD BY GOLD CLASSIFICATION (HCC): Primary | ICD-10-CM

## 2020-07-16 PROCEDURE — 99441 PR PHYS/QHP TELEPHONE EVALUATION 5-10 MIN: CPT | Performed by: INTERNAL MEDICINE

## 2020-07-16 RX ORDER — ALBUTEROL SULFATE 90 UG/1
2 AEROSOL, METERED RESPIRATORY (INHALATION) EVERY 4 HOURS PRN
Qty: 3 INHALER | Refills: 3 | Status: SHIPPED | OUTPATIENT
Start: 2020-07-16 | End: 2022-01-20 | Stop reason: SDUPTHER

## 2020-07-16 RX ORDER — ALBUTEROL SULFATE 90 UG/1
AEROSOL, METERED RESPIRATORY (INHALATION)
COMMUNITY
End: 2020-07-16 | Stop reason: SDUPTHER

## 2020-07-16 RX ORDER — SERTRALINE HYDROCHLORIDE 25 MG/1
25 TABLET, FILM COATED ORAL DAILY
COMMUNITY

## 2020-07-17 NOTE — PROGRESS NOTES
Subjective   Joana Cuevas is a 69 y.o. female.     Background: Pt with copd , pulmonary fibrosis, chronic respiratory failure with hypoxia, RBILD.  FEV1 36% predicted 2019 normal dlco.  hx admission with severe acute respiratory failure.  Hypoxic response to exercise with tachycardia on 6MWT.     This visit has been rescheduled as a phone visit to comply with patient safety concerns in accordance with CDC recommendations. Total time of discussion was 6 minutes.     Chief Complaint   Patient presents with   • COPD        History of Present Illness   She is doing ok after antibiotics we called in.  She sees Dr. Lennon next month.  She is compliant with oxygen and uses it and benefits from it.  Sat sometimes as high as 97.  She uses 3 lpm.  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   has a past surgical history that includes Hysterectomy; Mastectomy; and Tonsilectomy, adenoidectomy, bilateral myringotomy and tubes.  family history includes Cancer in her mother; Diabetes in her mother; Heart failure in her father.   reports that she quit smoking about 5 years ago. Her smoking use included cigarettes. She has a 70.00 pack-year smoking history. She has never used smokeless tobacco. She reports that she drinks alcohol. She reports that she does not use drugs.  No Known Allergies  Medications    Current Outpatient Medications:   •  acetaminophen (TYLENOL) 500 MG tablet, Take 500 mg by mouth Every 6 (Six) Hours As Needed for Mild Pain ., Disp: , Rfl:   •  albuterol sulfate HFA (Ventolin HFA) 108 (90 Base) MCG/ACT inhaler, Inhale 2 puffs Every 4 (Four) Hours As Needed for Wheezing., Disp: 3 inhaler, Rfl: 3  •  Biotin 90930 MCG tablet, Take 1,000 mcg by mouth Daily., Disp: , Rfl:   •  budesonide-formoterol (SYMBICORT) 160-4.5 MCG/ACT  inhaler, INHALE 2 PUFFS TWICE DAILY, Disp: 3 inhaler, Rfl: 3  •  clobetasol (TEMOVATE) 0.05 % cream, clobetasol 0.05 % topical cream  APPLY A THIN LAYER TO THE AFFECTED AREA(S) BY TOPICAL ROUTE 2 TIMES PER DAY, Disp: , Rfl:   •  MAGNESIUM PO, Take 400 mg by mouth Daily., Disp: , Rfl:   •  sertraline (ZOLOFT) 25 MG tablet, Daily., Disp: , Rfl:   •  tiotropium (SPIRIVA) 18 MCG per inhalation capsule, Place 1 capsule into inhaler and inhale Daily., Disp: 90 capsule, Rfl: 3         PFT Values        Some values may be hidden. Unless noted otherwise, only the newest values recorded on each date are displayed.         Old Values PFT Results 4/17/19   FVC 68%   FEV1 36%   FEV1/FVC 42.15%   DLCO 42%      Pre Drug PFT Results 4/17/19   No data to display.      Post Drug PFT Results 4/17/19   No data to display.      Other Tests PFT Results 4/17/19   No data to display.                Assessment/Plan   Problem List Items Addressed This Visit        Pulmonary Problems    Stage 4 very severe COPD by GOLD classification (CMS/[facility]) - Primary    Relevant Medications    albuterol sulfate HFA (Ventolin HFA) 108 (90 Base) MCG/ACT inhaler    Respiratory bronchiolitis interstitial lung disease (CMS/[facility])    Relevant Medications    albuterol sulfate HFA (Ventolin HFA) 108 (90 Base) MCG/ACT inhaler    Chronic respiratory failure with hypoxia (CMS/HCC)    Pulmonary fibrosis (CMS/[facility])    Relevant Medications    albuterol sulfate HFA (Ventolin HFA) 108 (90 Base) MCG/ACT inhaler          She is stable.  Refill albuterol.  Continue oxygen       Electronically signed by Ar Kendrick MD, 7/16/2020, 22:02

## 2021-01-21 ENCOUNTER — OFFICE VISIT (OUTPATIENT)
Dept: PULMONOLOGY | Facility: CLINIC | Age: 71
End: 2021-01-21

## 2021-01-21 VITALS
DIASTOLIC BLOOD PRESSURE: 80 MMHG | WEIGHT: 175 LBS | TEMPERATURE: 97.3 F | OXYGEN SATURATION: 91 % | HEART RATE: 126 BPM | HEIGHT: 63 IN | SYSTOLIC BLOOD PRESSURE: 142 MMHG | BODY MASS INDEX: 31.01 KG/M2

## 2021-01-21 DIAGNOSIS — J44.9 STAGE 4 VERY SEVERE COPD BY GOLD CLASSIFICATION (HCC): ICD-10-CM

## 2021-01-21 DIAGNOSIS — J84.10 PULMONARY FIBROSIS (HCC): ICD-10-CM

## 2021-01-21 DIAGNOSIS — J96.11 CHRONIC RESPIRATORY FAILURE WITH HYPOXIA (HCC): Primary | ICD-10-CM

## 2021-01-21 PROCEDURE — 99214 OFFICE O/P EST MOD 30 MIN: CPT | Performed by: INTERNAL MEDICINE

## 2021-01-21 NOTE — PROGRESS NOTES
"Background:  Pt with copd , pulmonary fibrosis, chronic respiratory failure with hypoxia, RBILD.  FEV1 36% predicted 2019 normal dlco.  hx admission with severe acute respiratory failure.  Hypoxic response to exercise with tachycardia on 6MWT.    Chief Complaint  COPD    Subjective    History of Present Illness {CC  Problem List  Visit  Diagnosis   Encounters  Notes  Medications  Labs  Result Review Imaging  Media :23}     Joana Cuevas presents to [facility].  She has had guillian barre.  She is worried about the covid vaccine.  Has been doing well over the past 10 months.  Her daughter is a RT.  She desaturates with exercise sometimes.       Objective     Vital Signs:   /80   Pulse (!) 126   Temp 97.3 °F (36.3 °C)   Ht 160 cm (63\")   Wt 79.4 kg (175 lb)   SpO2 91% Comment: 3L  BMI 31.00 kg/m²   Physical Exam  Constitutional:       General: She is not in acute distress.     Appearance: She is not toxic-appearing.   Pulmonary:      Effort: Pulmonary effort is normal.   Skin:     General: Skin is warm and dry.   Neurological:      Mental Status: She is alert.        Result Review  Data Reviewed:{ Labs  Result Review  Imaging  Med Tab  Media :23}   CT Chest With Contrast (07/05/2019 03:17)  my personal interpretation: lingular (predominantly) and bilateral LL reticular opacities    PFT Values        Some values may be hidden. Unless noted otherwise, only the newest values recorded on each date are displayed.         Old Values PFT Results 4/17/19   FVC 68%   FEV1 36%   FEV1/FVC 42.15%   DLCO 42%      Pre Drug PFT Results 4/17/19   No data to display.      Post Drug PFT Results 4/17/19   No data to display.      Other Tests PFT Results 4/17/19   No data to display.                      Assessment and Plan {CC Problem List  Visit Diagnosis  ROS  Review (Popup)  Health Maintenance  Quality  BestPractice  Medications  SmartSets  SnapShot " Encounters  Media :23}   Problem List Items Addressed This Visit        Pulmonary Problems    Stage 4 very severe COPD by GOLD classification (CMS/HCC)    Chronic respiratory failure with hypoxia (CMS/HCC) - Primary    Pulmonary fibrosis (CMS/HCC)      she appears to be as stable as possible  Continue Spiriva and Symbicort.  Albuterol  Hold off on any imaging for now because covid.  Continue oxygen, compliant and benefiting    Follow Up {Instructions Charge Capture  Follow-up Communications :23}   Return in about 8 months (around 9/21/2021) for spirometry.  Patient was given instructions and counseling regarding her condition or for health maintenance advice. Please see specific information pulled into the AVS if appropriate.    Electronically signed by Ar Kendrick MD, 1/21/2021, 16:19 CST

## 2021-06-08 DIAGNOSIS — J44.9 STAGE 4 VERY SEVERE COPD BY GOLD CLASSIFICATION (HCC): Primary | ICD-10-CM

## 2021-09-16 ENCOUNTER — TRANSCRIBE ORDERS (OUTPATIENT)
Dept: LAB | Facility: HOSPITAL | Age: 71
End: 2021-09-16

## 2021-09-16 DIAGNOSIS — Z01.818 PREOPERATIVE TESTING: Primary | ICD-10-CM

## 2021-09-20 ENCOUNTER — LAB (OUTPATIENT)
Dept: LAB | Facility: HOSPITAL | Age: 71
End: 2021-09-20

## 2021-09-20 LAB — SARS-COV-2 ORF1AB RESP QL NAA+PROBE: NOT DETECTED

## 2021-09-20 PROCEDURE — C9803 HOPD COVID-19 SPEC COLLECT: HCPCS | Performed by: INTERNAL MEDICINE

## 2021-09-20 PROCEDURE — U0004 COV-19 TEST NON-CDC HGH THRU: HCPCS | Performed by: INTERNAL MEDICINE

## 2021-09-21 NOTE — PROGRESS NOTES
"Background:  Pt with copd , pulmonary fibrosis, chronic respiratory failure with hypoxia, RBILD.  FEV1 36% predicted 2019 normal dlco.  hx admission with severe acute respiratory failure.  Hypoxic response to exercise with tachycardia on 6MWT.    Chief Complaint  stage 4 very severe copd and chronic respiratory failure    Subjective    History of Present Illness       Joana Cuevas presents to [facility].  She has had a lot of sinus problems this year, better now.  She is out of breath in the mornings when she gets up, and pulse ox drops.  She uses inhaler in the evenings.  She needs refill for Symbicort.  She is using oxygen around the clock, compliant with it, benefiting from it.  No exacerbations.  She has had full vaccine for covid.        Objective     Vital Signs:   /64   Pulse 117   Ht 160 cm (63\")   Wt 75.6 kg (166 lb 9.6 oz)   SpO2 96% Comment: after 02 working  BMI 29.51 kg/m²   Physical Exam  Constitutional:       General: She is not in acute distress.     Appearance: She is well-developed. She is not ill-appearing or toxic-appearing.   HENT:      Head: Atraumatic.   Eyes:      General: No scleral icterus.     Conjunctiva/sclera: Conjunctivae normal.   Cardiovascular:      Rate and Rhythm: Normal rate and regular rhythm.      Heart sounds: S1 normal and S2 normal.   Pulmonary:      Effort: Pulmonary effort is normal.      Breath sounds: Normal breath sounds.   Abdominal:      General: There is no distension.   Musculoskeletal:         General: No deformity.      Cervical back: Neck supple.   Skin:     Coloration: Skin is not pale.      Findings: No rash.   Neurological:      Mental Status: She is alert.        Result Review  Data Reviewed:{ Labs  Result Review  Imaging  Media :23}       PFT Values        Some values may be hidden. Unless noted otherwise, only the newest values recorded on each date are displayed.         Old Values PFT " Results 9/23/21   No data to display.      Pre Drug PFT Results 9/23/21   FVC 68.88   FEV1 41.27   FEF 25-75% 18.12   FEV1/FVC 48      Post Drug PFT Results 9/23/21   No data to display.      Other Tests PFT Results 9/23/21   No data to display.                      Assessment and Plan  {CC Problem List  Visit Diagnosis  ROS  Review (Popup)  Health Maintenance  Quality  BestPractice  Medications  SmartSets  SnapShot Encounters  Media :23}   Problem List Items Addressed This Visit        Pulmonary Problems    Stage 4 very severe COPD by GOLD classification (CMS/HCC)    Relevant Medications    budesonide-formoterol (SYMBICORT) 160-4.5 MCG/ACT inhaler    Other Relevant Orders    Pulmonary Function Test (Completed)    XR Chest 2 View    Chronic respiratory failure with hypoxia (CMS/HCC)    Relevant Orders    XR Chest 2 View    Pulmonary fibrosis (CMS/HCC) - Primary    Relevant Medications    budesonide-formoterol (SYMBICORT) 160-4.5 MCG/ACT inhaler    Other Relevant Orders    XR Chest 2 View      she is stable from resp failure standpoint, continue oxygen.  Stable with copd, stable to slightly improved spirometry, continue inhalers  Fibrosis appears stable, follow up cxr next year.    Follow Up {Instructions Charge Capture  Follow-up Communications :23}   Return in about 6 months (around 3/23/2022).     Patient was given instructions and counseling regarding her condition or for health maintenance advice. Please see specific information pulled into the AVS if appropriate.    Electronically signed by Ar Kendrick MD, 9/23/2021, 15:45 CDT

## 2021-09-23 ENCOUNTER — OFFICE VISIT (OUTPATIENT)
Dept: PULMONOLOGY | Facility: CLINIC | Age: 71
End: 2021-09-23

## 2021-09-23 VITALS
SYSTOLIC BLOOD PRESSURE: 118 MMHG | DIASTOLIC BLOOD PRESSURE: 64 MMHG | HEART RATE: 117 BPM | HEIGHT: 63 IN | OXYGEN SATURATION: 96 % | WEIGHT: 166.6 LBS | BODY MASS INDEX: 29.52 KG/M2

## 2021-09-23 DIAGNOSIS — J84.10 PULMONARY FIBROSIS (HCC): Primary | ICD-10-CM

## 2021-09-23 DIAGNOSIS — J96.11 CHRONIC RESPIRATORY FAILURE WITH HYPOXIA (HCC): ICD-10-CM

## 2021-09-23 DIAGNOSIS — J44.9 STAGE 4 VERY SEVERE COPD BY GOLD CLASSIFICATION (HCC): ICD-10-CM

## 2021-09-23 PROCEDURE — 99214 OFFICE O/P EST MOD 30 MIN: CPT | Performed by: INTERNAL MEDICINE

## 2021-09-23 PROCEDURE — 94010 BREATHING CAPACITY TEST: CPT | Performed by: INTERNAL MEDICINE

## 2021-09-23 RX ORDER — BUDESONIDE AND FORMOTEROL FUMARATE DIHYDRATE 160; 4.5 UG/1; UG/1
2 AEROSOL RESPIRATORY (INHALATION) 2 TIMES DAILY
Qty: 30.6 G | Refills: 3 | Status: SHIPPED | OUTPATIENT
Start: 2021-09-23 | End: 2022-03-31

## 2021-09-23 NOTE — PROGRESS NOTES
Patient had a negative COVID result. PFT performed. See scanned results for additional information.

## 2021-09-23 NOTE — PROCEDURES
Pulmonary Function Test  Performed by: John Mckeon CMA  Authorized by: Ar Kendrick MD      Pre Drug % Predicted    FVC: 68.88%   FEV1: 41.27%   FEF 25-75%: 18.12%   FEV1/FVC: 48%    Interpretation   Spirometry   Spirometry shows severe obstruction. There is reduced midflow suggesting small airway/airflow obstruction.   Review of FVL curve   Patient's effort is normal.

## 2022-01-20 RX ORDER — ALBUTEROL SULFATE 90 UG/1
2 AEROSOL, METERED RESPIRATORY (INHALATION) EVERY 4 HOURS PRN
Qty: 54 G | Refills: 3 | Status: SHIPPED | OUTPATIENT
Start: 2022-01-20 | End: 2023-02-08 | Stop reason: SDUPTHER

## 2022-01-20 NOTE — TELEPHONE ENCOUNTER
Rx Refill Note  Requested Prescriptions     Pending Prescriptions Disp Refills   • albuterol sulfate HFA (Ventolin HFA) 108 (90 Base) MCG/ACT inhaler 54 g 3     Sig: Inhale 2 puffs Every 4 (Four) Hours As Needed for Wheezing.      Last office visit with prescribing clinician: 9/23/2021      Next office visit with prescribing clinician: 3/31/2022            Heaven Rosa CMA  01/20/22, 15:22 CST

## 2022-03-31 ENCOUNTER — OFFICE VISIT (OUTPATIENT)
Dept: PULMONOLOGY | Facility: CLINIC | Age: 72
End: 2022-03-31

## 2022-03-31 VITALS
HEART RATE: 118 BPM | HEIGHT: 63 IN | DIASTOLIC BLOOD PRESSURE: 80 MMHG | SYSTOLIC BLOOD PRESSURE: 136 MMHG | OXYGEN SATURATION: 93 % | BODY MASS INDEX: 30.12 KG/M2 | WEIGHT: 170 LBS

## 2022-03-31 DIAGNOSIS — J44.9 STAGE 4 VERY SEVERE COPD BY GOLD CLASSIFICATION: Primary | ICD-10-CM

## 2022-03-31 DIAGNOSIS — J84.10 PULMONARY FIBROSIS: ICD-10-CM

## 2022-03-31 DIAGNOSIS — G61.0 GUILLAIN-BARRE SYNDROME: ICD-10-CM

## 2022-03-31 DIAGNOSIS — J96.11 CHRONIC RESPIRATORY FAILURE WITH HYPOXIA: ICD-10-CM

## 2022-03-31 PROCEDURE — 99214 OFFICE O/P EST MOD 30 MIN: CPT | Performed by: INTERNAL MEDICINE

## 2022-03-31 RX ORDER — HYDROCHLOROTHIAZIDE 25 MG/1
25 TABLET ORAL DAILY
COMMUNITY
Start: 2022-03-14 | End: 2022-09-23

## 2022-03-31 RX ORDER — BACLOFEN 5 MG/1
TABLET ORAL
Status: ON HOLD | COMMUNITY
Start: 2022-03-14 | End: 2022-08-09

## 2022-03-31 RX ORDER — BUDESONIDE AND FORMOTEROL FUMARATE DIHYDRATE 160; 4.5 UG/1; UG/1
2 AEROSOL RESPIRATORY (INHALATION) 2 TIMES DAILY
Qty: 30.6 G | Refills: 3 | Status: ON HOLD | OUTPATIENT
Start: 2022-03-31 | End: 2022-08-09

## 2022-03-31 NOTE — PROGRESS NOTES
"Background:  Pt with copd , pulmonary fibrosis, chronic respiratory failure with hypoxia, RBILD.  FEV1 36% predicted 2019 normal dlco.  hx admission with severe acute respiratory failure.  Hypoxic response to exercise with tachycardia on 6MWT.    Chief Complaint  COPD    Subjective    History of Present Illness       Joana Cuevas presents to [facility].  She had hypertension and severe hypoxia and muscle spasms and cough, complicated by UTI a couple of weeks ago.  Dr. Lennon gave her steroids and some baclofen, and antibiotics.  She is better from all that.  She is getting some hassle about the Symbicort.  Generic is more expensive than trade name version     Tobacco Use: Medium Risk   • Smoking Tobacco Use: Former Smoker   • Smokeless Tobacco Use: Never Used      Objective     Vital Signs:   /80   Pulse 118   Ht 160 cm (63\")   Wt 77.1 kg (170 lb)   SpO2 93% Comment: 4L  BMI 30.11 kg/m²   Physical Exam  Constitutional:       General: She is not in acute distress.     Appearance: She is well-developed. She is not ill-appearing or toxic-appearing.   HENT:      Head: Atraumatic.   Eyes:      General: No scleral icterus.     Conjunctiva/sclera: Conjunctivae normal.   Cardiovascular:      Rate and Rhythm: Normal rate and regular rhythm.      Heart sounds: S1 normal and S2 normal.   Pulmonary:      Effort: Pulmonary effort is normal.      Breath sounds: Normal breath sounds. No wheezing.   Abdominal:      General: There is no distension.   Musculoskeletal:         General: No deformity.      Cervical back: Neck supple.   Skin:     Coloration: Skin is not pale.      Findings: No rash.   Neurological:      Mental Status: She is alert.        Result Review  Data Reviewed:{ Labs  Result Review  Imaging  Media :23}       PFT Values        Some values may be hidden. Unless noted otherwise, only the newest values recorded on each date are displayed.       "   Old Values PFT Results 9/23/21   No data to display.      Pre Drug PFT Results 9/23/21   FVC 68.88   FEV1 41.27   FEF 25-75% 18.12   FEV1/FVC 48      Post Drug PFT Results 9/23/21   No data to display.      Other Tests PFT Results 9/23/21   No data to display.                 CT Chest With Contrast (07/05/2019 03:17)       Assessment and Plan  {CC Problem List  Visit Diagnosis  ROS  Review (Popup)  Health Maintenance  Quality  BestPractice  Medications  SmartSets  SnapShot Encounters  Media :23}   Diagnoses and all orders for this visit:    1. Stage 4 very severe COPD by GOLD classification (HCC) (Primary)    2. Guillain-Shannon syndrome (HCC)    3. Chronic respiratory failure with hypoxia (HCC)    4. Pulmonary fibrosis (HCC)    Other orders  -     budesonide-formoterol (Symbicort) 160-4.5 MCG/ACT inhaler; Inhale 2 puffs 2 (Two) Times a Day for 90 days.  Dispense: 30.6 g; Refill: 3    will refill Symbicort.  Continue Spiriva and albuterol  Continue oxygen at 4 lpm (up from 3 lpm prior to recent illness).  Monitor sat at home  Still weak in hands from GBS.  Continue supportive care. Unable to get flu vaccine  Old ct scan reviewed showing infiltrate/fibrosis in 2019.  Would be helpful to repeat a little later once she is further out from recent resp infection.      Follow Up {Instructions Charge Capture  Follow-up Communications :23}   No follow-ups on file.  Patient was given instructions and counseling regarding her condition or for health maintenance advice. Please see specific information pulled into the AVS if appropriate.    Electronically signed by Ar Kendrick MD, 3/31/2022, 15:22 CDT

## 2022-04-14 ENCOUNTER — TELEPHONE (OUTPATIENT)
Dept: PULMONOLOGY | Facility: CLINIC | Age: 72
End: 2022-04-14

## 2022-04-14 NOTE — TELEPHONE ENCOUNTER
Spoke to patient and her Daughter.  Patient's daughter is a RRT at [employer].  She is going to walk patient in the house to see if oxygen sats are adequate on 4L.  Dr Kendrick stated that was his reasoning for doing the 6MWT.  Dex, her daughter, is aware if 4L is not adequate, patient will need to be brought into the office for official testing.

## 2022-04-14 NOTE — TELEPHONE ENCOUNTER
Spoke to daughter.  Patient did drop her sats to 85% on 4L with exertion. Patient scheduled for 6MWT.

## 2022-04-26 ENCOUNTER — OFFICE VISIT (OUTPATIENT)
Dept: PULMONOLOGY | Facility: CLINIC | Age: 72
End: 2022-04-26

## 2022-04-26 DIAGNOSIS — J96.11 CHRONIC RESPIRATORY FAILURE WITH HYPOXIA: ICD-10-CM

## 2022-04-26 DIAGNOSIS — J44.9 STAGE 4 VERY SEVERE COPD BY GOLD CLASSIFICATION: ICD-10-CM

## 2022-04-26 PROCEDURE — 94761 N-INVAS EAR/PLS OXIMETRY MLT: CPT | Performed by: INTERNAL MEDICINE

## 2022-04-26 NOTE — PROCEDURES
Walking Oximetry  Performed by: Heaven Hodgson, RRT  Authorized by: Ar Kendrick MD     Supplemental Oxygen: continuous  Rest on O2 @ Liters:  4L cont  SAT %:  96  Exercise on O2 @ Liters:  4L cont  SAT %:  98

## 2022-04-26 NOTE — PROGRESS NOTES
She did not feel should could walk for 6 mins continuous. Patient does walk with a rolling walker. Dr Kendrick did verify he only wanted to make sure patient was on adequate oxygen.  Patient walked in our office around 200 feet total on 4L continuous. Her heartrate was 133 but once stop went back down into the low 100s.  Her sats never dropped below 98%.  She was walked on her 4L pulse dose concentrator and her saturation was never below 92%. At rest on her 4L pulse dose sats were 93%.  Patient is aware to monitor sats at home and to notify us if dropping below 90%.

## 2022-06-06 DIAGNOSIS — J44.9 STAGE 4 VERY SEVERE COPD BY GOLD CLASSIFICATION: ICD-10-CM

## 2022-06-06 RX ORDER — TIOTROPIUM BROMIDE 18 UG/1
CAPSULE ORAL; RESPIRATORY (INHALATION)
Qty: 90 CAPSULE | Refills: 3 | Status: ON HOLD | OUTPATIENT
Start: 2022-06-06 | End: 2022-08-09

## 2022-06-06 NOTE — TELEPHONE ENCOUNTER
Rx Refill Note  Requested Prescriptions     Pending Prescriptions Disp Refills   • Spiriva HandiHaler 18 MCG per inhalation capsule [Pharmacy Med Name: SPIRIVA HHLR CAP 18MCG] 90 capsule 3     Sig: INHALE THE CONTENTS OF ONE CAPSULE DAILY      Last office visit with prescribing clinician: 3/31/2022      Next office visit with prescribing clinician: 10/4/2022            Heaven Rosa CMA  06/06/22, 08:59 CDT

## 2022-07-20 ENCOUNTER — TELEPHONE (OUTPATIENT)
Dept: PULMONOLOGY | Facility: CLINIC | Age: 72
End: 2022-07-20

## 2022-07-20 NOTE — TELEPHONE ENCOUNTER
Do not recommend abx for viral illness. Studies do not support steroids in the outpatient setting. Continue all inhalers. Take full dose mucinex plain or DM for cough/congestion. Use albuterol every 4 hours as needed for SOA, cough, congestion. Monitor O2 levels and if low or SOA worse, go to the ER

## 2022-07-20 NOTE — TELEPHONE ENCOUNTER
Per patient's daughter she tested positive for Covid at home yesterday. Her symptoms are a sore throat, runny nose and cough. She noted that she feels more short of air when up moving but had not checked her oxygen levels.   She is on Symbicort, Spiriva and Albuterol. She has also been taking Vicks formula D cough syrup and has started taking Mucinex.    Daughter is asking if you thought steroids and antibiotics might benefit her taking them?  Daughter is aware that  is not in the office this week. And aware if her symptoms are urgent or severe she should go to the ER.  Please advise.

## 2022-08-09 ENCOUNTER — APPOINTMENT (OUTPATIENT)
Dept: GENERAL RADIOLOGY | Facility: HOSPITAL | Age: 72
End: 2022-08-09

## 2022-08-09 ENCOUNTER — HOSPITAL ENCOUNTER (INPATIENT)
Facility: HOSPITAL | Age: 72
LOS: 3 days | Discharge: HOME | End: 2022-08-12
Attending: FAMILY MEDICINE | Admitting: INTERNAL MEDICINE

## 2022-08-09 DIAGNOSIS — R77.8 ELEVATED TROPONIN: ICD-10-CM

## 2022-08-09 DIAGNOSIS — J44.1 COPD EXACERBATION: Primary | ICD-10-CM

## 2022-08-09 PROBLEM — R73.9 HYPERGLYCEMIA: Status: ACTIVE | Noted: 2022-08-09

## 2022-08-09 PROBLEM — I10 ESSENTIAL HYPERTENSION: Status: ACTIVE | Noted: 2022-08-09

## 2022-08-09 PROBLEM — R79.89 ELEVATED TROPONIN: Status: ACTIVE | Noted: 2022-08-09

## 2022-08-09 PROBLEM — J96.02 ACUTE RESPIRATORY FAILURE WITH HYPERCAPNIA: Status: ACTIVE | Noted: 2022-08-09

## 2022-08-09 LAB
ALBUMIN SERPL-MCNC: 4.8 G/DL (ref 3.5–5.2)
ALBUMIN/GLOB SERPL: 1.7 G/DL
ALP SERPL-CCNC: 60 U/L (ref 39–117)
ALT SERPL W P-5'-P-CCNC: 23 U/L (ref 1–33)
ANION GAP SERPL CALCULATED.3IONS-SCNC: 8 MMOL/L (ref 5–15)
ARTERIAL PATENCY WRIST A: NEGATIVE
ARTERIAL PATENCY WRIST A: NEGATIVE
AST SERPL-CCNC: 27 U/L (ref 1–32)
ATMOSPHERIC PRESS: 751 MMHG
ATMOSPHERIC PRESS: 752 MMHG
BASE EXCESS BLDA CALC-SCNC: 6.7 MMOL/L (ref 0–2)
BASE EXCESS BLDA CALC-SCNC: 9.3 MMOL/L (ref 0–2)
BASOPHILS # BLD AUTO: 0.07 10*3/MM3 (ref 0–0.2)
BASOPHILS NFR BLD AUTO: 0.6 % (ref 0–1.5)
BDY SITE: ABNORMAL
BDY SITE: ABNORMAL
BILIRUB SERPL-MCNC: 0.5 MG/DL (ref 0–1.2)
BODY TEMPERATURE: 37 C
BODY TEMPERATURE: 37 C
BUN SERPL-MCNC: 11 MG/DL (ref 8–23)
BUN/CREAT SERPL: 29.7 (ref 7–25)
CALCIUM SPEC-SCNC: 9.3 MG/DL (ref 8.6–10.5)
CHLORIDE SERPL-SCNC: 97 MMOL/L (ref 98–107)
CO2 SERPL-SCNC: 36 MMOL/L (ref 22–29)
CREAT SERPL-MCNC: 0.37 MG/DL (ref 0.57–1)
D DIMER PPP FEU-MCNC: 0.42 MCGFEU/ML (ref 0–0.5)
D-LACTATE SERPL-SCNC: 0.9 MMOL/L (ref 0.5–2)
DEPRECATED RDW RBC AUTO: 49.3 FL (ref 37–54)
EGFRCR SERPLBLD CKD-EPI 2021: 109.3 ML/MIN/1.73
EOSINOPHIL # BLD AUTO: 0.75 10*3/MM3 (ref 0–0.4)
EOSINOPHIL NFR BLD AUTO: 6.8 % (ref 0.3–6.2)
EPAP: 6
ERYTHROCYTE [DISTWIDTH] IN BLOOD BY AUTOMATED COUNT: 13.2 % (ref 12.3–15.4)
GAS FLOW AIRWAY: 4 LPM
GLOBULIN UR ELPH-MCNC: 2.8 GM/DL
GLUCOSE BLDC GLUCOMTR-MCNC: 145 MG/DL (ref 70–130)
GLUCOSE SERPL-MCNC: 175 MG/DL (ref 65–99)
HCO3 BLDA-SCNC: 35.1 MMOL/L (ref 20–26)
HCO3 BLDA-SCNC: 35.2 MMOL/L (ref 20–26)
HCT VFR BLD AUTO: 42.1 % (ref 34–46.6)
HGB BLD-MCNC: 13.4 G/DL (ref 12–15.9)
HOLD SPECIMEN: NORMAL
IMM GRANULOCYTES # BLD AUTO: 0.02 10*3/MM3 (ref 0–0.05)
IMM GRANULOCYTES NFR BLD AUTO: 0.2 % (ref 0–0.5)
INHALED O2 CONCENTRATION: 30 %
IPAP: 14
LYMPHOCYTES # BLD AUTO: 2.54 10*3/MM3 (ref 0.7–3.1)
LYMPHOCYTES NFR BLD AUTO: 22.9 % (ref 19.6–45.3)
Lab: ABNORMAL
MAGNESIUM SERPL-MCNC: 1.8 MG/DL (ref 1.6–2.4)
MCH RBC QN AUTO: 32.2 PG (ref 26.6–33)
MCHC RBC AUTO-ENTMCNC: 31.8 G/DL (ref 31.5–35.7)
MCV RBC AUTO: 101.2 FL (ref 79–97)
MODALITY: ABNORMAL
MODALITY: ABNORMAL
MONOCYTES # BLD AUTO: 0.91 10*3/MM3 (ref 0.1–0.9)
MONOCYTES NFR BLD AUTO: 8.2 % (ref 5–12)
NEUTROPHILS NFR BLD AUTO: 6.79 10*3/MM3 (ref 1.7–7)
NEUTROPHILS NFR BLD AUTO: 61.3 % (ref 42.7–76)
NOTIFIED BY: ABNORMAL
NOTIFIED WHO: ABNORMAL
NRBC BLD AUTO-RTO: 0 /100 WBC (ref 0–0.2)
NT-PROBNP SERPL-MCNC: 209.8 PG/ML (ref 0–900)
PCO2 BLDA: 51.8 MM HG (ref 35–45)
PCO2 BLDA: 67.2 MM HG (ref 35–45)
PCO2 TEMP ADJ BLD: 51.8 MM HG (ref 35–45)
PCO2 TEMP ADJ BLD: 67.2 MM HG (ref 35–45)
PH BLDA: 7.33 PH UNITS (ref 7.35–7.45)
PH BLDA: 7.44 PH UNITS (ref 7.35–7.45)
PH, TEMP CORRECTED: 7.33 PH UNITS (ref 7.35–7.45)
PH, TEMP CORRECTED: 7.44 PH UNITS (ref 7.35–7.45)
PLATELET # BLD AUTO: 265 10*3/MM3 (ref 140–450)
PMV BLD AUTO: 10.3 FL (ref 6–12)
PO2 BLDA: 105 MM HG (ref 83–108)
PO2 BLDA: 108 MM HG (ref 83–108)
PO2 TEMP ADJ BLD: 105 MM HG (ref 83–108)
PO2 TEMP ADJ BLD: 108 MM HG (ref 83–108)
POTASSIUM SERPL-SCNC: 3.7 MMOL/L (ref 3.5–5.2)
PROCALCITONIN SERPL-MCNC: 0.04 NG/ML (ref 0–0.25)
PROT SERPL-MCNC: 7.6 G/DL (ref 6–8.5)
RBC # BLD AUTO: 4.16 10*6/MM3 (ref 3.77–5.28)
SAO2 % BLDCOA: 98.2 % (ref 94–99)
SAO2 % BLDCOA: 98.5 % (ref 94–99)
SARS-COV-2 RNA PNL SPEC NAA+PROBE: NOT DETECTED
SET MECH RESP RATE: 16
SODIUM SERPL-SCNC: 141 MMOL/L (ref 136–145)
TROPONIN T SERPL-MCNC: 0.12 NG/ML (ref 0–0.03)
TROPONIN T SERPL-MCNC: <0.01 NG/ML (ref 0–0.03)
VENTILATOR MODE: ABNORMAL
VENTILATOR MODE: ABNORMAL
WBC NRBC COR # BLD: 11.08 10*3/MM3 (ref 3.4–10.8)
WHOLE BLOOD HOLD COAG: NORMAL
WHOLE BLOOD HOLD SPECIMEN: NORMAL

## 2022-08-09 PROCEDURE — 85025 COMPLETE CBC W/AUTO DIFF WBC: CPT | Performed by: NURSE PRACTITIONER

## 2022-08-09 PROCEDURE — 83880 ASSAY OF NATRIURETIC PEPTIDE: CPT | Performed by: NURSE PRACTITIONER

## 2022-08-09 PROCEDURE — 94660 CPAP INITIATION&MGMT: CPT

## 2022-08-09 PROCEDURE — 36600 WITHDRAWAL OF ARTERIAL BLOOD: CPT

## 2022-08-09 PROCEDURE — 84484 ASSAY OF TROPONIN QUANT: CPT | Performed by: NURSE PRACTITIONER

## 2022-08-09 PROCEDURE — 94799 UNLISTED PULMONARY SVC/PX: CPT

## 2022-08-09 PROCEDURE — 82962 GLUCOSE BLOOD TEST: CPT

## 2022-08-09 PROCEDURE — 94640 AIRWAY INHALATION TREATMENT: CPT

## 2022-08-09 PROCEDURE — 25010000002 MAGNESIUM SULFATE 2 GM/50ML SOLUTION: Performed by: NURSE PRACTITIONER

## 2022-08-09 PROCEDURE — 87635 SARS-COV-2 COVID-19 AMP PRB: CPT | Performed by: NURSE PRACTITIONER

## 2022-08-09 PROCEDURE — 93005 ELECTROCARDIOGRAM TRACING: CPT

## 2022-08-09 PROCEDURE — 83735 ASSAY OF MAGNESIUM: CPT | Performed by: NURSE PRACTITIONER

## 2022-08-09 PROCEDURE — 80053 COMPREHEN METABOLIC PANEL: CPT | Performed by: NURSE PRACTITIONER

## 2022-08-09 PROCEDURE — 25010000002 ENOXAPARIN PER 10 MG: Performed by: INTERNAL MEDICINE

## 2022-08-09 PROCEDURE — 93010 ELECTROCARDIOGRAM REPORT: CPT | Performed by: INTERNAL MEDICINE

## 2022-08-09 PROCEDURE — 87040 BLOOD CULTURE FOR BACTERIA: CPT | Performed by: NURSE PRACTITIONER

## 2022-08-09 PROCEDURE — 71045 X-RAY EXAM CHEST 1 VIEW: CPT

## 2022-08-09 PROCEDURE — 25010000002 METHYLPREDNISOLONE PER 125 MG: Performed by: NURSE PRACTITIONER

## 2022-08-09 PROCEDURE — 94761 N-INVAS EAR/PLS OXIMETRY MLT: CPT

## 2022-08-09 PROCEDURE — 99285 EMERGENCY DEPT VISIT HI MDM: CPT

## 2022-08-09 PROCEDURE — 82803 BLOOD GASES ANY COMBINATION: CPT

## 2022-08-09 PROCEDURE — 85379 FIBRIN DEGRADATION QUANT: CPT | Performed by: NURSE PRACTITIONER

## 2022-08-09 PROCEDURE — 93005 ELECTROCARDIOGRAM TRACING: CPT | Performed by: NURSE PRACTITIONER

## 2022-08-09 PROCEDURE — 36415 COLL VENOUS BLD VENIPUNCTURE: CPT

## 2022-08-09 PROCEDURE — 84145 PROCALCITONIN (PCT): CPT | Performed by: NURSE PRACTITIONER

## 2022-08-09 PROCEDURE — 83605 ASSAY OF LACTIC ACID: CPT | Performed by: NURSE PRACTITIONER

## 2022-08-09 RX ORDER — ONDANSETRON 4 MG/1
4 TABLET, FILM COATED ORAL EVERY 6 HOURS PRN
Status: DISCONTINUED | OUTPATIENT
Start: 2022-08-09 | End: 2022-08-12 | Stop reason: HOSPADM

## 2022-08-09 RX ORDER — ASPIRIN 81 MG/1
81 TABLET ORAL DAILY
Status: DISCONTINUED | OUTPATIENT
Start: 2022-08-10 | End: 2022-08-11

## 2022-08-09 RX ORDER — SODIUM CHLORIDE 0.9 % (FLUSH) 0.9 %
10 SYRINGE (ML) INJECTION AS NEEDED
Status: DISCONTINUED | OUTPATIENT
Start: 2022-08-09 | End: 2022-08-10 | Stop reason: SDUPTHER

## 2022-08-09 RX ORDER — HEPARIN SODIUM 1000 [USP'U]/ML
60 INJECTION, SOLUTION INTRAVENOUS; SUBCUTANEOUS AS NEEDED
Status: DISCONTINUED | OUTPATIENT
Start: 2022-08-09 | End: 2022-08-09

## 2022-08-09 RX ORDER — DEXTROSE MONOHYDRATE 25 G/50ML
25 INJECTION, SOLUTION INTRAVENOUS
Status: DISCONTINUED | OUTPATIENT
Start: 2022-08-09 | End: 2022-08-12 | Stop reason: HOSPADM

## 2022-08-09 RX ORDER — ACETAMINOPHEN 325 MG/1
650 TABLET ORAL EVERY 4 HOURS PRN
Status: DISCONTINUED | OUTPATIENT
Start: 2022-08-09 | End: 2022-08-09 | Stop reason: SDUPTHER

## 2022-08-09 RX ORDER — HEPARIN SODIUM 10000 [USP'U]/100ML
12 INJECTION, SOLUTION INTRAVENOUS
Status: DISCONTINUED | OUTPATIENT
Start: 2022-08-09 | End: 2022-08-09

## 2022-08-09 RX ORDER — IPRATROPIUM BROMIDE AND ALBUTEROL SULFATE 2.5; .5 MG/3ML; MG/3ML
3 SOLUTION RESPIRATORY (INHALATION)
Status: DISCONTINUED | OUTPATIENT
Start: 2022-08-09 | End: 2022-08-09

## 2022-08-09 RX ORDER — SODIUM CHLORIDE 9 MG/ML
75 INJECTION, SOLUTION INTRAVENOUS CONTINUOUS
Status: DISCONTINUED | OUTPATIENT
Start: 2022-08-09 | End: 2022-08-10

## 2022-08-09 RX ORDER — ACETAMINOPHEN 325 MG/1
650 TABLET ORAL EVERY 6 HOURS PRN
Status: DISCONTINUED | OUTPATIENT
Start: 2022-08-09 | End: 2022-08-09

## 2022-08-09 RX ORDER — ACETAMINOPHEN 650 MG/1
650 SUPPOSITORY RECTAL EVERY 4 HOURS PRN
Status: DISCONTINUED | OUTPATIENT
Start: 2022-08-09 | End: 2022-08-09 | Stop reason: SDUPTHER

## 2022-08-09 RX ORDER — ENOXAPARIN SODIUM 100 MG/ML
40 INJECTION SUBCUTANEOUS DAILY
Status: DISCONTINUED | OUTPATIENT
Start: 2022-08-09 | End: 2022-08-09

## 2022-08-09 RX ORDER — ACETAMINOPHEN 650 MG/1
650 SUPPOSITORY RECTAL EVERY 4 HOURS PRN
Status: DISCONTINUED | OUTPATIENT
Start: 2022-08-09 | End: 2022-08-12 | Stop reason: HOSPADM

## 2022-08-09 RX ORDER — MAGNESIUM SULFATE HEPTAHYDRATE 40 MG/ML
2 INJECTION, SOLUTION INTRAVENOUS ONCE
Status: COMPLETED | OUTPATIENT
Start: 2022-08-09 | End: 2022-08-09

## 2022-08-09 RX ORDER — ACETAMINOPHEN 500 MG
1000 TABLET ORAL ONCE
Status: COMPLETED | OUTPATIENT
Start: 2022-08-09 | End: 2022-08-09

## 2022-08-09 RX ORDER — ASPIRIN 325 MG
325 TABLET, DELAYED RELEASE (ENTERIC COATED) ORAL ONCE
Status: COMPLETED | OUTPATIENT
Start: 2022-08-09 | End: 2022-08-09

## 2022-08-09 RX ORDER — NICOTINE POLACRILEX 4 MG
15 LOZENGE BUCCAL
Status: DISCONTINUED | OUTPATIENT
Start: 2022-08-09 | End: 2022-08-12 | Stop reason: HOSPADM

## 2022-08-09 RX ORDER — SERTRALINE HYDROCHLORIDE 25 MG/1
25 TABLET, FILM COATED ORAL DAILY
Status: DISCONTINUED | OUTPATIENT
Start: 2022-08-10 | End: 2022-08-12 | Stop reason: HOSPADM

## 2022-08-09 RX ORDER — SODIUM CHLORIDE 0.9 % (FLUSH) 0.9 %
10 SYRINGE (ML) INJECTION AS NEEDED
Status: DISCONTINUED | OUTPATIENT
Start: 2022-08-09 | End: 2022-08-12 | Stop reason: HOSPADM

## 2022-08-09 RX ORDER — METHYLPREDNISOLONE SODIUM SUCCINATE 125 MG/2ML
60 INJECTION, POWDER, LYOPHILIZED, FOR SOLUTION INTRAMUSCULAR; INTRAVENOUS EVERY 8 HOURS
Status: DISCONTINUED | OUTPATIENT
Start: 2022-08-09 | End: 2022-08-11

## 2022-08-09 RX ORDER — GUAIFENESIN 600 MG/1
600 TABLET, EXTENDED RELEASE ORAL EVERY 12 HOURS SCHEDULED
Status: DISCONTINUED | OUTPATIENT
Start: 2022-08-09 | End: 2022-08-12 | Stop reason: HOSPADM

## 2022-08-09 RX ORDER — BUDESONIDE AND FORMOTEROL FUMARATE DIHYDRATE 160; 4.5 UG/1; UG/1
2 AEROSOL RESPIRATORY (INHALATION)
Status: DISCONTINUED | OUTPATIENT
Start: 2022-08-09 | End: 2022-08-12 | Stop reason: HOSPADM

## 2022-08-09 RX ORDER — HEPARIN SODIUM 1000 [USP'U]/ML
30 INJECTION, SOLUTION INTRAVENOUS; SUBCUTANEOUS AS NEEDED
Status: DISCONTINUED | OUTPATIENT
Start: 2022-08-09 | End: 2022-08-09

## 2022-08-09 RX ORDER — SERTRALINE HYDROCHLORIDE 25 MG/1
25 TABLET, FILM COATED ORAL ONCE
Status: COMPLETED | OUTPATIENT
Start: 2022-08-09 | End: 2022-08-09

## 2022-08-09 RX ORDER — ENOXAPARIN SODIUM 100 MG/ML
1 INJECTION SUBCUTANEOUS EVERY 12 HOURS
Status: DISCONTINUED | OUTPATIENT
Start: 2022-08-09 | End: 2022-08-10

## 2022-08-09 RX ORDER — HYDROCHLOROTHIAZIDE 25 MG/1
25 TABLET ORAL DAILY
Status: DISCONTINUED | OUTPATIENT
Start: 2022-08-10 | End: 2022-08-09

## 2022-08-09 RX ORDER — IPRATROPIUM BROMIDE AND ALBUTEROL SULFATE 2.5; .5 MG/3ML; MG/3ML
3 SOLUTION RESPIRATORY (INHALATION) ONCE
Status: COMPLETED | OUTPATIENT
Start: 2022-08-09 | End: 2022-08-09

## 2022-08-09 RX ORDER — BUDESONIDE AND FORMOTEROL FUMARATE DIHYDRATE 160; 4.5 UG/1; UG/1
2 AEROSOL RESPIRATORY (INHALATION)
COMMUNITY
End: 2022-10-03

## 2022-08-09 RX ORDER — METHYLPREDNISOLONE SODIUM SUCCINATE 125 MG/2ML
125 INJECTION, POWDER, LYOPHILIZED, FOR SOLUTION INTRAMUSCULAR; INTRAVENOUS ONCE
Status: COMPLETED | OUTPATIENT
Start: 2022-08-09 | End: 2022-08-09

## 2022-08-09 RX ORDER — SODIUM CHLORIDE 0.9 % (FLUSH) 0.9 %
10 SYRINGE (ML) INJECTION EVERY 12 HOURS SCHEDULED
Status: DISCONTINUED | OUTPATIENT
Start: 2022-08-09 | End: 2022-08-12 | Stop reason: HOSPADM

## 2022-08-09 RX ORDER — ONDANSETRON 2 MG/ML
4 INJECTION INTRAMUSCULAR; INTRAVENOUS EVERY 6 HOURS PRN
Status: DISCONTINUED | OUTPATIENT
Start: 2022-08-09 | End: 2022-08-12 | Stop reason: HOSPADM

## 2022-08-09 RX ORDER — INSULIN LISPRO 100 [IU]/ML
2-7 INJECTION, SOLUTION INTRAVENOUS; SUBCUTANEOUS
Status: DISCONTINUED | OUTPATIENT
Start: 2022-08-09 | End: 2022-08-12 | Stop reason: HOSPADM

## 2022-08-09 RX ORDER — ACETAMINOPHEN 325 MG/1
650 TABLET ORAL EVERY 4 HOURS PRN
Status: DISCONTINUED | OUTPATIENT
Start: 2022-08-09 | End: 2022-08-12 | Stop reason: HOSPADM

## 2022-08-09 RX ORDER — CETIRIZINE HYDROCHLORIDE 10 MG/1
10 TABLET ORAL DAILY
Status: DISCONTINUED | OUTPATIENT
Start: 2022-08-09 | End: 2022-08-12 | Stop reason: HOSPADM

## 2022-08-09 RX ORDER — CETIRIZINE HYDROCHLORIDE 10 MG/1
10 TABLET ORAL DAILY
COMMUNITY

## 2022-08-09 RX ORDER — HEPARIN SODIUM 1000 [USP'U]/ML
4000 INJECTION, SOLUTION INTRAVENOUS; SUBCUTANEOUS ONCE
Status: DISCONTINUED | OUTPATIENT
Start: 2022-08-09 | End: 2022-08-09

## 2022-08-09 RX ADMIN — METHYLPREDNISOLONE SODIUM SUCCINATE 60 MG: 125 INJECTION, POWDER, FOR SOLUTION INTRAMUSCULAR; INTRAVENOUS at 21:16

## 2022-08-09 RX ADMIN — METOPROLOL TARTRATE 12.5 MG: 25 TABLET, FILM COATED ORAL at 21:15

## 2022-08-09 RX ADMIN — MAGNESIUM SULFATE HEPTAHYDRATE 2 G: 2 INJECTION, SOLUTION INTRAVENOUS at 12:46

## 2022-08-09 RX ADMIN — SERTRALINE HYDROCHLORIDE 25 MG: 25 TABLET ORAL at 22:29

## 2022-08-09 RX ADMIN — IPRATROPIUM BROMIDE AND ALBUTEROL SULFATE 3 ML: 2.5; .5 SOLUTION RESPIRATORY (INHALATION) at 12:52

## 2022-08-09 RX ADMIN — ASPIRIN 325 MG: 325 TABLET, COATED ORAL at 21:15

## 2022-08-09 RX ADMIN — ALBUTEROL SULFATE 1.25 MG: 2.5 SOLUTION RESPIRATORY (INHALATION) at 20:32

## 2022-08-09 RX ADMIN — SODIUM CHLORIDE 75 ML/HR: 9 INJECTION, SOLUTION INTRAVENOUS at 19:03

## 2022-08-09 RX ADMIN — ENOXAPARIN SODIUM 80 MG: 100 INJECTION SUBCUTANEOUS at 21:15

## 2022-08-09 RX ADMIN — IPRATROPIUM BROMIDE 0.5 MG: 0.5 SOLUTION RESPIRATORY (INHALATION) at 20:33

## 2022-08-09 RX ADMIN — ACETAMINOPHEN 1000 MG: 500 TABLET ORAL at 14:44

## 2022-08-09 RX ADMIN — METHYLPREDNISOLONE SODIUM SUCCINATE 125 MG: 125 INJECTION, POWDER, FOR SOLUTION INTRAMUSCULAR; INTRAVENOUS at 12:47

## 2022-08-09 RX ADMIN — GUAIFENESIN 600 MG: 600 TABLET, EXTENDED RELEASE ORAL at 21:14

## 2022-08-09 RX ADMIN — Medication 10 ML: at 21:16

## 2022-08-09 RX ADMIN — BUDESONIDE AND FORMOTEROL FUMARATE DIHYDRATE 2 PUFF: 160; 4.5 AEROSOL RESPIRATORY (INHALATION) at 20:33

## 2022-08-09 NOTE — ED PROVIDER NOTES
Subjective   Patient is a pleasant 44-year-old female who presents ER today with complaints of shortness of breath and chest pain.  The patient states that she was sleeping this morning around 9 AM when she woke up and was feeling short of breath.  She has a history of COPD stage IV and normally wears O2 at 4 L via nasal cannula continuously.  The patient states that she has some chest tightness that just feels like there is something on her chest making it hard for her to breathe.  She is wheezing.  She tells me that she is just now getting over COVID-19.  She is diagnosed with this on 2022.  Patient normally follows with the pulmonology group at [facility].  She presents here today for further evaluation.  Received an aspirin in route per EMS.      History provided by:  Patient   used: No        Review of Systems   Respiratory: Positive for cough, chest tightness and shortness of breath.    All other systems reviewed and are negative.      Past Medical History:   Diagnosis Date   • Breast cancer (HCC)    • Cancer (HCC)    • Chronic bronchitis (HCC)    • Guillain Barré syndrome (HCC) 2016   • Laceration of head 2019   • Pneumonia    • Syncope 2019       No Known Allergies    Past Surgical History:   Procedure Laterality Date   • HYSTERECTOMY     • MASTECTOMY     • TONSILECTOMY, ADENOIDECTOMY, BILATERAL MYRINGOTOMY AND TUBES         Family History   Problem Relation Age of Onset   • Cancer Mother    • Diabetes Mother    • Heart failure Father        Social History     Socioeconomic History   • Marital status:    Tobacco Use   • Smoking status: Former Smoker     Packs/day: 2.00     Years: 35.00     Pack years: 70.00     Types: Cigarettes     Quit date:      Years since quittin.6   • Smokeless tobacco: Never Used   Substance and Sexual Activity   • Alcohol use: Yes   • Drug use: No   • Sexual activity: Defer           Objective   Physical Exam  Vitals and  nursing note reviewed.   Constitutional:       Appearance: She is well-developed. She is ill-appearing.   HENT:      Head: Normocephalic and atraumatic.      Mouth/Throat:      Mouth: Mucous membranes are moist.      Pharynx: Oropharynx is clear.   Eyes:      Conjunctiva/sclera: Conjunctivae normal.      Pupils: Pupils are equal, round, and reactive to light.   Cardiovascular:      Rate and Rhythm: Regular rhythm. Tachycardia present.   Pulmonary:      Breath sounds: Examination of the right-upper field reveals wheezing. Examination of the right-middle field reveals wheezing. Wheezing present.   Abdominal:      General: Bowel sounds are normal.      Palpations: Abdomen is soft.   Skin:     General: Skin is warm and dry.      Capillary Refill: Capillary refill takes less than 2 seconds.   Neurological:      General: No focal deficit present.      Mental Status: She is alert and oriented to person, place, and time.   Psychiatric:         Mood and Affect: Mood normal.         Behavior: Behavior normal.         Procedures           ED Course  ED Course as of 08/09/22 1449   Tue Aug 09, 2022   1235 WBC(!): 11.08 [LF]   1448 Patient has greatly improved after being placed on BiPAP and given DuoNeb and magnesium.  She was also given Solu-Medrol.  I discussed the case with Dr. Little.  He will admit the patient at this time in stable condition.  Patient was altered so seen by Dr. Barbour in the ER today who agrees with plan of care. [LF]      ED Course User Index  [LF] Yana Cabrera, APRN                                 XR Chest 1 View   Final Result   Stable chronic lung changes and evidence of COPD with   interstitial fibrosis. No superimposed acute infiltrates are identified.   This report was finalized on 08/09/2022 14:17 by Dr. Driss Lopez MD.        Labs Reviewed   COMPREHENSIVE METABOLIC PANEL - Abnormal; Notable for the following components:       Result Value    Glucose 175 (*)     Creatinine 0.37 (*)      Chloride 97 (*)     CO2 36.0 (*)     BUN/Creatinine Ratio 29.7 (*)     All other components within normal limits    Narrative:     GFR Normal >60  Chronic Kidney Disease <60  Kidney Failure <15     CBC WITH AUTO DIFFERENTIAL - Abnormal; Notable for the following components:    WBC 11.08 (*)     .2 (*)     Eosinophil % 6.8 (*)     Monocytes, Absolute 0.91 (*)     Eosinophils, Absolute 0.75 (*)     All other components within normal limits   BLOOD GAS, ARTERIAL - Abnormal; Notable for the following components:    pH, Arterial 7.326 (*)     pCO2, Arterial 67.2 (*)     HCO3, Arterial 35.1 (*)     Base Excess, Arterial 6.7 (*)     pCO2, Temperature Corrected 67.2 (*)     pH, Temp Corrected 7.326 (*)     All other components within normal limits   BLOOD GAS, ARTERIAL - Abnormal; Notable for the following components:    pCO2, Arterial 51.8 (*)     HCO3, Arterial 35.2 (*)     Base Excess, Arterial 9.3 (*)     pCO2, Temperature Corrected 51.8 (*)     All other components within normal limits   COVID-19,PAT BIO IN-HOUSE,NASAL SWAB NO TRANSPORT MEDIA 2 HR TAT - Normal    Narrative:     Fact sheet for providers: https://www.fda.gov/media/008483/download     Fact sheet for patients: https://www.fda.gov/media/554032/download    Test performed by PCR.    Consider negative results in combination with clinical observations, patient history, and epidemiological information.   BNP (IN-HOUSE) - Normal    Narrative:     Among patients with dyspnea, NT-proBNP is highly sensitive for the detection of acute congestive heart failure. In addition NT-proBNP of <300 pg/ml effectively rules out acute congestive heart failure with 99% negative predictive value.    Results may be falsely decreased if patient taking Biotin.     D-DIMER, QUANTITATIVE - Normal    Narrative:     Reference Range is 0-0.50 MCGFEU/mL. However, results <0.50 MCGFEU/mL tends to rule out DVT or PE. Results >0.50 MCGFEU/mL are not useful in predicting absence or  "presence of DVT or PE.     TROPONIN (IN-HOUSE) - Normal    Narrative:     Troponin T Reference Range:  <= 0.03 ng/mL-   Negative for AMI  >0.03 ng/mL-     Abnormal for myocardial necrosis.  Clinicians would have to utilize clinical acumen, EKG, Troponin and serial changes to determine if it is an Acute Myocardial Infarction or myocardial injury due to an underlying chronic condition.       Results may be falsely decreased if patient taking Biotin.     LACTIC ACID, PLASMA - Normal   PROCALCITONIN - Normal    Narrative:     As a Marker for Sepsis (Non-Neonates):    1. <0.5 ng/mL represents a low risk of severe sepsis and/or septic shock.  2. >2 ng/mL represents a high risk of severe sepsis and/or septic shock.    As a Marker for Lower Respiratory Tract Infections that require antibiotic therapy:    PCT on Admission    Antibiotic Therapy       6-12 Hrs later    >0.5                Strongly Recommended  >0.25 - <0.5        Recommended   0.1 - 0.25          Discouraged              Remeasure/reassess PCT  <0.1                Strongly Discouraged     Remeasure/reassess PCT    As 28 day mortality risk marker: \"Change in Procalcitonin Result\" (>80% or <=80%) if Day 0 (or Day 1) and Day 4 values are available. Refer to http://[email or web address] [place]-pct-calculator.com    Change in PCT <=80%  A decrease of PCT levels below or equal to 80% defines a positive change in PCT test result representing a higher risk for 28-day all-cause mortality of patients diagnosed with severe sepsis for septic shock.    Change in PCT >80%  A decrease of PCT levels of more than 80% defines a negative change in PCT result representing a lower risk for 28-day all-cause mortality of patients diagnosed with severe sepsis or septic shock.      MAGNESIUM - Normal   BLOOD CULTURE   BLOOD CULTURE   BLOOD GAS, ARTERIAL   BLOOD GAS, ARTERIAL   RAINBOW DRAW    Narrative:     The following orders were created for panel order Emmett Draw.  Procedure                       "         Abnormality         Status                     ---------                               -----------         ------                     Green Top (Gel)[[id number]]                                  Final result               Lavender Top[[id number]]                                     Final result               Red Top[[id number]]                                          Final result               Crawley Blood Culture Mata...[[id number]]                      Final result               Naidu Top[[id number]]                                         In process                 Light Blue Top[[id number]]                                   Final result                 Please view results for these tests on the individual orders.   GREEN TOP   LAVENDER TOP   RED TOP   RAINBOW BLOOD CULTURE BOTTLES - 1 SET   LIGHT BLUE TOP   CBC AND DIFFERENTIAL    Narrative:     The following orders were created for panel order CBC & Differential.  Procedure                               Abnormality         Status                     ---------                               -----------         ------                     CBC Auto Differential[[id number]]        Abnormal            Final result                 Please view results for these tests on the individual orders.   GRAY TOP               MDM  Number of Diagnoses or Management Options  COPD exacerbation (HCC): new and requires workup     Amount and/or Complexity of Data Reviewed  Clinical lab tests: ordered and reviewed  Tests in the radiology section of CPT®: ordered and reviewed  Tests in the medicine section of CPT®: ordered and reviewed  Decide to obtain previous medical records or to obtain history from someone other than the patient: yes  Discuss the patient with other providers: yes (Dr. Barbour)        Final diagnoses:   COPD exacerbation (HCC)       ED Disposition  ED Disposition     ED Disposition   Decision to Admit    Condition   --    Comment   Level of Care: Telemetry [5]    Diagnosis: COPD exacerbation ([facility]) [[id number]]   Admitting Physician: JOSE AMOR [[id number]]   Attending Physician: JOSE AMOR [[id number]]   Isolate for COVID?: No [0]   Certification: I Certify That Inpatient Hospital Services Are Medically Necessary For Greater Than 2 Midnights               No follow-up provider specified.       Medication List      No changes were made to your prescriptions during this visit.          Yana Cabrera, APRN  08/09/22 144

## 2022-08-09 NOTE — H&P
[facility] Medicine Services  HISTORY AND PHYSICAL    Date of Admission: 8/9/2022  Primary Care Physician: Sonu Lennon MD    Subjective     Chief Complaint: Shortness of breathing    History of Present Illness  Joana Cuevas is a 71-year-old female with a past medical history of breast cancer, chronic bronchitis, [place] Barré syndrome, pneumonia, syncope and COPD.  She does have a history of smoking in the past.  She stopped several years ago.  She presented to [facility] emergency department via EMS with complaints of shortness of breathing and chest pain.  Chest pain was described as tightness.  Patient wears oxygen 4 L continuously.  She did utilize her albuterol inhaler without improvement.  Patient diagnosed with COVID July 19 however she said all her symptoms have resolved.  Currently she is feeling much better tolerating BiPAP without complaint.  Wheezing noted throughout.  Patient is followed by Dr. Ar Kendrick pulmonology as outpatient.  She has no other complaints.  She is admitted for further evaluation treatment.    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    Social History:  reports that she quit smoking about 7 years ago. Her smoking use included cigarettes. She has a 70.00 pack-year smoking history. She has never used  "smokeless tobacco. She reports current alcohol use of about 5.0 standard drinks of alcohol per week. She reports that she does not use drugs.    Code Status: Full, if unable speak for herself her  or children will speak for her      Allergies:  No Known Allergies    Medications:  Prior to Admission medications    Medication Sig Start Date End Date Taking? Authorizing Provider   acetaminophen (TYLENOL) 500 MG tablet Take 500 mg by mouth Every 6 (Six) Hours As Needed for Mild Pain .    Mike aDvidson MD   albuterol sulfate HFA (Ventolin HFA) 108 (90 Base) MCG/ACT inhaler Inhale 2 puffs Every 4 (Four) Hours As Needed for Wheezing. 1/20/22   Ar Kendrick MD   Baclofen 5 MG tablet TAKE 1 TABLET 3 TIMES A DAY BY MOUTH AS NEEDED. 3/14/22   Mike Davidson MD   Biotin 25674 MCG tablet Take 1,000 mcg by mouth Daily.    Mike Davidson MD   budesonide-formoterol (Symbicort) 160-4.5 MCG/ACT inhaler Inhale 2 puffs 2 (Two) Times a Day for 90 days. 3/31/22 6/29/22  Ar Kendrick MD   clobetasol (TEMOVATE) 0.05 % cream clobetasol 0.05 % topical cream   APPLY A THIN LAYER TO THE AFFECTED AREA(S) BY TOPICAL ROUTE 2 TIMES PER DAY    Mike Davidson MD   hydroCHLOROthiazide (HYDRODIURIL) 25 MG tablet Take 25 mg by mouth Daily. 3/14/22   Mike Davidson MD   sertraline (ZOLOFT) 25 MG tablet Daily.    Mike Davidson MD   Spiriva HandiHaler 18 MCG per inhalation capsule INHALE THE CONTENTS OF ONE CAPSULE DAILY 6/6/22   Ar Kendrick MD     I have utilized all available immediate resources to obtain, update, and review the patient's current medications.    Objective     /72 (BP Location: Left arm, Patient Position: Sitting)   Pulse 88   Temp 98.3 °F (36.8 °C) (Oral)   Resp 22   Ht 160 cm (62.99\")   Wt 75 kg (165 lb 6.4 oz)   SpO2 98%   BMI 29.31 kg/m²   Physical Exam  Vitals reviewed.   HENT:      Head: Normocephalic and atraumatic.      Mouth/Throat:      " Mouth: Mucous membranes are dry.      Pharynx: Oropharynx is clear.   Eyes:      Extraocular Movements: Extraocular movements intact.      Conjunctiva/sclera: Conjunctivae normal.   Cardiovascular:      Rate and Rhythm: Regular rhythm. Tachycardia present.   Pulmonary:      Effort: Pulmonary effort is normal.      Breath sounds: Wheezing present.   Abdominal:      General: There is no distension.      Palpations: Abdomen is soft.      Tenderness: There is no abdominal tenderness.   Musculoskeletal:         General: Normal range of motion.      Cervical back: Normal range of motion and neck supple.      Right lower leg: No edema.      Left lower leg: No edema.   Skin:     General: Skin is warm and dry.   Neurological:      General: No focal deficit present.      Mental Status: She is alert and oriented to person, place, and time.   Psychiatric:         Mood and Affect: Mood normal.         Behavior: Behavior normal.       Pertinent Data:   Lab Results (last 72 hours)     Procedure Component Value Units Date/Time    Blood Culture - Blood, Hand, Right [[id number]] Collected: 08/09/22 1406    Specimen: Blood from Hand, Right Updated: 08/09/22 1423    COVID-19,[facility] [employer] IN-HOUSE,Nasal Swab No Transport Media 3-4 HR TAT - Swab, Nasal Cavity [[id number]]  (Normal) Collected: 08/09/22 1305    Specimen: Swab from Nasal Cavity Updated: 08/09/22 1410     COVID19 Not Detected    Blood Gas, Arterial - [[id number]]  (Abnormal) Collected: 08/09/22 1409    Specimen: Arterial Blood Updated: 08/09/22 1409     Site Right Radial     Ming's Test Negative     pH, Arterial 7.440 pH units      pCO2, Arterial 51.8 mm Hg      Comment: 83 Value above reference range        pO2, Arterial 108.0 mm Hg      Comment: 83 Value above reference range        HCO3, Arterial 35.2 mmol/L      Comment: 83 Value above reference range        Base Excess, Arterial 9.3 mmol/L      Comment: 83 Value above reference range        O2 Saturation, Arterial 98.2 %       Temperature 37.0 C      Barometric Pressure for Blood Gas 751 mmHg      Modality BiPap     FIO2 30 %      Ventilator Mode BiPAP     Set [facility] Resp Rate 16.0     IPAP 14     Comment: Meter: [id number]     :  [id number]        EPAP 6     Collected by [id number]     pCO2, Temperature Corrected 51.8 mm Hg      pH, Temp Corrected 7.440 pH Units      pO2, Temperature Corrected 108 mm Hg     Comprehensive Metabolic Panel [[id number]]  (Abnormal) Collected: 08/09/22 1221    Specimen: Blood Updated: 08/09/22 1303     Glucose 175 mg/dL      BUN 11 mg/dL      Creatinine 0.37 mg/dL      Sodium 141 mmol/L      Potassium 3.7 mmol/L      Chloride 97 mmol/L      CO2 36.0 mmol/L      Calcium 9.3 mg/dL      Total Protein 7.6 g/dL      Albumin 4.80 g/dL      ALT (SGPT) 23 U/L      AST (SGOT) 27 U/L      Alkaline Phosphatase 60 U/L      Total Bilirubin 0.5 mg/dL      Globulin 2.8 gm/dL      A/G Ratio 1.7 g/dL      BUN/Creatinine Ratio 29.7     Anion Gap 8.0 mmol/L      eGFR 109.3 mL/min/1.73     Procalcitonin [[id number]]  (Normal) Collected: 08/09/22 1221    Specimen: Blood Updated: 08/09/22 1301     Procalcitonin 0.04 ng/mL     Magnesium [[id number]]  (Normal) Collected: 08/09/22 1221    Specimen: Blood Updated: 08/09/22 1257     Magnesium 1.8 mg/dL     Troponin [[id number]]  (Normal) Collected: 08/09/22 1221    Specimen: Blood Updated: 08/09/22 1257     Troponin T <0.010 ng/mL     Lactic Acid, Plasma [[id number]]  (Normal) Collected: 08/09/22 1221    Specimen: Blood Updated: 08/09/22 1255     Lactate 0.9 mmol/L     BNP [[id number]]  (Normal) Collected: 08/09/22 1221    Specimen: Blood Updated: 08/09/22 1254     proBNP 209.8 pg/mL     D-dimer, Quantitative [[id number]]  (Normal) Collected: 08/09/22 1221    Specimen: Blood Updated: 08/09/22 1247     D-Dimer, Quantitative 0.42 MCGFEU/mL     Blood Gas, Arterial - [[id number]]  (Abnormal) Collected: 08/09/22 1240    Specimen: Arterial Blood Updated: 08/09/22 1240     Site Left Radial      Ming's Test Negative     pH, Arterial 7.326 pH units      Comment: 84 Value below reference range        pCO2, Arterial 67.2 mm Hg      Comment: 86 Value above critical limit        pO2, Arterial 105.0 mm Hg      HCO3, Arterial 35.1 mmol/L      Comment: 83 Value above reference range        Base Excess, Arterial 6.7 mmol/L      Comment: 83 Value above reference range        O2 Saturation, Arterial 98.5 %      Temperature 37.0 C      Barometric Pressure for Blood Gas 752 mmHg      Modality Nasal Cannula     Flow Rate 4.0 lpm      Ventilator Mode NA     Notified Who LENORE PEDERSEN     Notified By [id number]     Notified Time 08/09/2022 12:41     Collected by [id number]     Comment: Meter: [id number]     :  [id number]        pCO2, Temperature Corrected 67.2 mm Hg      pH, Temp Corrected 7.326 pH Units      pO2, Temperature Corrected 105 mm Hg     Blood Culture - Blood, Arm, Right [[id number]] Collected: 08/09/22 1221    Specimen: Blood from Arm, Right Updated: 08/09/22 1238    CBC Auto Differential [[id number]]  (Abnormal) Collected: 08/09/22 1221    Specimen: Blood Updated: 08/09/22 1234     WBC 11.08 10*3/mm3      RBC 4.16 10*6/mm3      Hemoglobin 13.4 g/dL      Hematocrit 42.1 %      .2 fL      MCH 32.2 pg      MCHC 31.8 g/dL      RDW 13.2 %      RDW-SD 49.3 fl      MPV 10.3 fL      Platelets 265 10*3/mm3      Neutrophil % 61.3 %      Lymphocyte % 22.9 %      Monocyte % 8.2 %      Eosinophil % 6.8 %      Basophil % 0.6 %      Immature Grans % 0.2 %      Neutrophils, Absolute 6.79 10*3/mm3      Lymphocytes, Absolute 2.54 10*3/mm3      Monocytes, Absolute 0.91 10*3/mm3      Eosinophils, Absolute 0.75 10*3/mm3      Basophils, Absolute 0.07 10*3/mm3      Immature Grans, Absolute 0.02 10*3/mm3      nRBC 0.0 /100 WBC           Imaging Results (Last 24 Hours)     Procedure Component Value Units Date/Time    XR Chest 1 View [[id number]] Collected: 08/09/22 1416     Updated: 08/09/22 1420    Narrative:       EXAMINATION: XR CHEST 1 VW- 8/9/2022 2:16 PM CDT     HISTORY: shortness of breath.     REPORT: A frontal view of the chest was obtained.     COMPARISON: Chest x-ray 3/1/2019.     The lungs are hyperinflated, there are coarse interstitial markings as  before. This is compatible with COPD. No infiltrate or consolidation is  identified. Heart size is upper limits of normal. No evidence of CHF is  identified. There is no pneumothorax or pleural effusion. Surgical clips  are noted in the axilla bilaterally.       Impression:      Stable chronic lung changes and evidence of COPD with  interstitial fibrosis. No superimposed acute infiltrates are identified.  This report was finalized on 08/09/2022 14:17 by Dr. Driss Lopez MD.          Assessment / Plan     Assessment:   Active Hospital Problems    Diagnosis    • **Acute respiratory failure with hypercapnia (HCC)    • COPD exacerbation (HCC)    • Hyperglycemia    • Essential hypertension    • Elevated troponin    • Pulmonary fibrosis (HCC)    • Chronic respiratory failure with hypoxia (HCC)        Plan:   1.  Admit as inpatient  2.  RT to manage BiPAP, ABGs as needed, transition to home 4 L nasal cannula as tolerated, incentive spirometry, continuous pulse oximetry  3.  Solu-Medrol 60 mg IV every 8 hours, Mucinex, half-strength DuoNebs 4 times a day, Symbicort  4.  Home medications reviewed and restarted as appropriate, hold HCTZ  5.  Start metoprolol tartrate 12.5 mg twice daily for blood pressure management  6.  Monitor glucose AC with regular insulin sliding scale coverage  7.  DVT prophylaxis with Lovenox  8.  Gentle hydration with normal saline 75 mL/hour  9.  Repeat troponin x2, cardiac monitoring      I discussed the patient's findings and my recommendations with: Xu Haque MD      Patient seen and examined by me on 8/9/2022 at 3:45 PM.    Electronically signed by JOHANNE Malone, 08/09/22, 23:27 CDT.

## 2022-08-10 ENCOUNTER — APPOINTMENT (OUTPATIENT)
Dept: CARDIOLOGY | Facility: HOSPITAL | Age: 72
End: 2022-08-10

## 2022-08-10 LAB
ANION GAP SERPL CALCULATED.3IONS-SCNC: 11 MMOL/L (ref 5–15)
BASOPHILS # BLD AUTO: 0.01 10*3/MM3 (ref 0–0.2)
BASOPHILS NFR BLD AUTO: 0.2 % (ref 0–1.5)
BH CV ECHO MEAS - AO MAX PG: 5.2 MMHG
BH CV ECHO MEAS - AO MEAN PG: 3 MMHG
BH CV ECHO MEAS - AO ROOT DIAM: 3.4 CM
BH CV ECHO MEAS - AO V2 MAX: 114 CM/SEC
BH CV ECHO MEAS - AO V2 VTI: 20.1 CM
BH CV ECHO MEAS - AVA(I,D): 2.8 CM2
BH CV ECHO MEAS - EDV(CUBED): 129.6 ML
BH CV ECHO MEAS - EDV(MOD-SP2): 119 ML
BH CV ECHO MEAS - EDV(MOD-SP4): 122 ML
BH CV ECHO MEAS - EF(MOD-BP): 53.2 %
BH CV ECHO MEAS - EF(MOD-SP2): 58.1 %
BH CV ECHO MEAS - EF(MOD-SP4): 49.8 %
BH CV ECHO MEAS - EF_3D-VOL: 55 %
BH CV ECHO MEAS - ESV(CUBED): 44 ML
BH CV ECHO MEAS - ESV(MOD-SP2): 49.9 ML
BH CV ECHO MEAS - ESV(MOD-SP4): 61.3 ML
BH CV ECHO MEAS - FS: 30.2 %
BH CV ECHO MEAS - IVS/LVPW: 1.02 CM
BH CV ECHO MEAS - IVSD: 0.87 CM
BH CV ECHO MEAS - LA DIMENSION: 3.7 CM
BH CV ECHO MEAS - LAT PEAK E' VEL: 10.6 CM/SEC
BH CV ECHO MEAS - LV DIASTOLIC VOL/BSA (35-75): 69.3 CM2
BH CV ECHO MEAS - LV MASS(C)D: 152.3 GRAMS
BH CV ECHO MEAS - LV MAX PG: 3.4 MMHG
BH CV ECHO MEAS - LV MEAN PG: 2 MMHG
BH CV ECHO MEAS - LV SYSTOLIC VOL/BSA (12-30): 34.8 CM2
BH CV ECHO MEAS - LV V1 MAX: 91.5 CM/SEC
BH CV ECHO MEAS - LV V1 VTI: 16.3 CM
BH CV ECHO MEAS - LVIDD: 5.1 CM
BH CV ECHO MEAS - LVIDS: 3.5 CM
BH CV ECHO MEAS - LVOT AREA: 3.5 CM2
BH CV ECHO MEAS - LVOT DIAM: 2.1 CM
BH CV ECHO MEAS - LVPWD: 0.85 CM
BH CV ECHO MEAS - MED PEAK E' VEL: 5.8 CM/SEC
BH CV ECHO MEAS - MV DEC TIME: 0.11 MSEC
BH CV ECHO MEAS - MV E MAX VEL: 104 CM/SEC
BH CV ECHO MEAS - SI(MOD-SP2): 39.2 ML/M2
BH CV ECHO MEAS - SI(MOD-SP4): 34.5 ML/M2
BH CV ECHO MEAS - SV(LVOT): 56.5 ML
BH CV ECHO MEAS - SV(MOD-SP2): 69.1 ML
BH CV ECHO MEAS - SV(MOD-SP4): 60.7 ML
BH CV ECHO MEAS - TR MAX PG: 12.5 MMHG
BH CV ECHO MEAS - TR MAX VEL: 177 CM/SEC
BH CV ECHO MEASUREMENTS AVERAGE E/E' RATIO: 12.68
BH CV XLRA - RV BASE: 3.1 CM
BH CV XLRA - RV LENGTH: 6.2 CM
BH CV XLRA - RV MID: 3.2 CM
BUN SERPL-MCNC: 9 MG/DL (ref 8–23)
BUN/CREAT SERPL: 36 (ref 7–25)
CALCIUM SPEC-SCNC: 9 MG/DL (ref 8.6–10.5)
CHLORIDE SERPL-SCNC: 96 MMOL/L (ref 98–107)
CHOLEST SERPL-MCNC: 230 MG/DL (ref 0–200)
CO2 SERPL-SCNC: 31 MMOL/L (ref 22–29)
CREAT SERPL-MCNC: 0.25 MG/DL (ref 0.57–1)
DEPRECATED RDW RBC AUTO: 47 FL (ref 37–54)
EGFRCR SERPLBLD CKD-EPI 2021: 120.2 ML/MIN/1.73
EOSINOPHIL # BLD AUTO: 0 10*3/MM3 (ref 0–0.4)
EOSINOPHIL NFR BLD AUTO: 0 % (ref 0.3–6.2)
ERYTHROCYTE [DISTWIDTH] IN BLOOD BY AUTOMATED COUNT: 13.2 % (ref 12.3–15.4)
GLUCOSE BLDC GLUCOMTR-MCNC: 138 MG/DL (ref 70–130)
GLUCOSE BLDC GLUCOMTR-MCNC: 178 MG/DL (ref 70–130)
GLUCOSE SERPL-MCNC: 141 MG/DL (ref 65–99)
HBA1C MFR BLD: 5 % (ref 4.8–5.6)
HCT VFR BLD AUTO: 38.3 % (ref 34–46.6)
HDLC SERPL-MCNC: 107 MG/DL (ref 40–60)
HGB BLD-MCNC: 12.9 G/DL (ref 12–15.9)
IMM GRANULOCYTES # BLD AUTO: 0.05 10*3/MM3 (ref 0–0.05)
IMM GRANULOCYTES NFR BLD AUTO: 0.9 % (ref 0–0.5)
LDLC SERPL CALC-MCNC: 114 MG/DL (ref 0–100)
LDLC/HDLC SERPL: 1.05 {RATIO}
LEFT ATRIUM VOLUME INDEX: 19.9 ML/M2
LEFT ATRIUM VOLUME: 35 ML
LV EF 2D ECHO EST: 45 %
LYMPHOCYTES # BLD AUTO: 0.6 10*3/MM3 (ref 0.7–3.1)
LYMPHOCYTES NFR BLD AUTO: 11.2 % (ref 19.6–45.3)
MAXIMAL PREDICTED HEART RATE: 151 BPM
MCH RBC QN AUTO: 32.6 PG (ref 26.6–33)
MCHC RBC AUTO-ENTMCNC: 33.7 G/DL (ref 31.5–35.7)
MCV RBC AUTO: 96.7 FL (ref 79–97)
MONOCYTES # BLD AUTO: 0.08 10*3/MM3 (ref 0.1–0.9)
MONOCYTES NFR BLD AUTO: 1.5 % (ref 5–12)
NEUTROPHILS NFR BLD AUTO: 4.64 10*3/MM3 (ref 1.7–7)
NEUTROPHILS NFR BLD AUTO: 86.2 % (ref 42.7–76)
NRBC BLD AUTO-RTO: 0 /100 WBC (ref 0–0.2)
PLATELET # BLD AUTO: 259 10*3/MM3 (ref 140–450)
PMV BLD AUTO: 10.4 FL (ref 6–12)
POTASSIUM SERPL-SCNC: 3.2 MMOL/L (ref 3.5–5.2)
RBC # BLD AUTO: 3.96 10*6/MM3 (ref 3.77–5.28)
SODIUM SERPL-SCNC: 138 MMOL/L (ref 136–145)
STRESS TARGET HR: 128 BPM
TRIGL SERPL-MCNC: 52 MG/DL (ref 0–150)
TROPONIN T SERPL-MCNC: 0.07 NG/ML (ref 0–0.03)
VLDLC SERPL-MCNC: 9 MG/DL (ref 5–40)
WBC NRBC COR # BLD: 5.38 10*3/MM3 (ref 3.4–10.8)

## 2022-08-10 PROCEDURE — 25010000002 MIDAZOLAM HCL (PF) 5 MG/5ML SOLUTION: Performed by: INTERNAL MEDICINE

## 2022-08-10 PROCEDURE — C1760 CLOSURE DEV, VASC: HCPCS

## 2022-08-10 PROCEDURE — 93306 TTE W/DOPPLER COMPLETE: CPT | Performed by: INTERNAL MEDICINE

## 2022-08-10 PROCEDURE — 93005 ELECTROCARDIOGRAM TRACING: CPT | Performed by: NURSE PRACTITIONER

## 2022-08-10 PROCEDURE — B2111ZZ FLUOROSCOPY OF MULTIPLE CORONARY ARTERIES USING LOW OSMOLAR CONTRAST: ICD-10-PCS | Performed by: INTERNAL MEDICINE

## 2022-08-10 PROCEDURE — 94799 UNLISTED PULMONARY SVC/PX: CPT

## 2022-08-10 PROCEDURE — 80061 LIPID PANEL: CPT | Performed by: NURSE PRACTITIONER

## 2022-08-10 PROCEDURE — 4A023N7 MEASUREMENT OF CARDIAC SAMPLING AND PRESSURE, LEFT HEART, PERCUTANEOUS APPROACH: ICD-10-PCS | Performed by: INTERNAL MEDICINE

## 2022-08-10 PROCEDURE — C1769 GUIDE WIRE: HCPCS | Performed by: INTERNAL MEDICINE

## 2022-08-10 PROCEDURE — 94660 CPAP INITIATION&MGMT: CPT

## 2022-08-10 PROCEDURE — 25010000002 FENTANYL CITRATE (PF) 50 MCG/ML SOLUTION: Performed by: INTERNAL MEDICINE

## 2022-08-10 PROCEDURE — B2151ZZ FLUOROSCOPY OF LEFT HEART USING LOW OSMOLAR CONTRAST: ICD-10-PCS | Performed by: INTERNAL MEDICINE

## 2022-08-10 PROCEDURE — 85025 COMPLETE CBC W/AUTO DIFF WBC: CPT | Performed by: INTERNAL MEDICINE

## 2022-08-10 PROCEDURE — 99152 MOD SED SAME PHYS/QHP 5/>YRS: CPT | Performed by: INTERNAL MEDICINE

## 2022-08-10 PROCEDURE — 25010000002 METHYLPREDNISOLONE PER 125 MG: Performed by: NURSE PRACTITIONER

## 2022-08-10 PROCEDURE — 99222 1ST HOSP IP/OBS MODERATE 55: CPT | Performed by: INTERNAL MEDICINE

## 2022-08-10 PROCEDURE — 25010000002 PERFLUTREN 6.52 MG/ML SUSPENSION: Performed by: INTERNAL MEDICINE

## 2022-08-10 PROCEDURE — 83036 HEMOGLOBIN GLYCOSYLATED A1C: CPT | Performed by: NURSE PRACTITIONER

## 2022-08-10 PROCEDURE — 82962 GLUCOSE BLOOD TEST: CPT

## 2022-08-10 PROCEDURE — 94761 N-INVAS EAR/PLS OXIMETRY MLT: CPT

## 2022-08-10 PROCEDURE — 84484 ASSAY OF TROPONIN QUANT: CPT | Performed by: INTERNAL MEDICINE

## 2022-08-10 PROCEDURE — 93458 L HRT ARTERY/VENTRICLE ANGIO: CPT | Performed by: INTERNAL MEDICINE

## 2022-08-10 PROCEDURE — 80048 BASIC METABOLIC PNL TOTAL CA: CPT | Performed by: NURSE PRACTITIONER

## 2022-08-10 PROCEDURE — 0 IOPAMIDOL PER 1 ML: Performed by: INTERNAL MEDICINE

## 2022-08-10 PROCEDURE — 93306 TTE W/DOPPLER COMPLETE: CPT

## 2022-08-10 PROCEDURE — 25010000002 HEPARIN (PORCINE) 1000-0.9 UT/500ML-% SOLUTION: Performed by: INTERNAL MEDICINE

## 2022-08-10 PROCEDURE — 94664 DEMO&/EVAL PT USE INHALER: CPT

## 2022-08-10 PROCEDURE — 25010000002 HEPARIN (PORCINE) 2000-0.9 UNIT/L-% SOLUTION: Performed by: INTERNAL MEDICINE

## 2022-08-10 PROCEDURE — C1894 INTRO/SHEATH, NON-LASER: HCPCS | Performed by: INTERNAL MEDICINE

## 2022-08-10 PROCEDURE — 25010000002 ENOXAPARIN PER 10 MG

## 2022-08-10 PROCEDURE — 25010000002 DIPHENHYDRAMINE PER 50 MG: Performed by: INTERNAL MEDICINE

## 2022-08-10 PROCEDURE — 25010000002 METHYLPREDNISOLONE PER 125 MG: Performed by: INTERNAL MEDICINE

## 2022-08-10 RX ORDER — SODIUM CHLORIDE 9 MG/ML
100 INJECTION, SOLUTION INTRAVENOUS CONTINUOUS
Status: DISCONTINUED | OUTPATIENT
Start: 2022-08-10 | End: 2022-08-11

## 2022-08-10 RX ORDER — HYDROCODONE BITARTRATE AND ACETAMINOPHEN 5; 325 MG/1; MG/1
1 TABLET ORAL EVERY 4 HOURS PRN
Status: DISCONTINUED | OUTPATIENT
Start: 2022-08-10 | End: 2022-08-12 | Stop reason: HOSPADM

## 2022-08-10 RX ORDER — POTASSIUM CHLORIDE 750 MG/1
40 CAPSULE, EXTENDED RELEASE ORAL ONCE
Status: COMPLETED | OUTPATIENT
Start: 2022-08-10 | End: 2022-08-10

## 2022-08-10 RX ORDER — ATORVASTATIN CALCIUM 40 MG/1
40 TABLET, FILM COATED ORAL NIGHTLY
Status: DISCONTINUED | OUTPATIENT
Start: 2022-08-10 | End: 2022-08-12 | Stop reason: HOSPADM

## 2022-08-10 RX ORDER — ENOXAPARIN SODIUM 100 MG/ML
30 INJECTION SUBCUTANEOUS EVERY 24 HOURS
Status: DISCONTINUED | OUTPATIENT
Start: 2022-08-10 | End: 2022-08-12 | Stop reason: HOSPADM

## 2022-08-10 RX ORDER — HEPARIN SODIUM 200 [USP'U]/100ML
INJECTION, SOLUTION INTRAVENOUS AS NEEDED
Status: DISCONTINUED | OUTPATIENT
Start: 2022-08-10 | End: 2022-08-10 | Stop reason: HOSPADM

## 2022-08-10 RX ORDER — FENTANYL CITRATE 50 UG/ML
INJECTION, SOLUTION INTRAMUSCULAR; INTRAVENOUS AS NEEDED
Status: DISCONTINUED | OUTPATIENT
Start: 2022-08-10 | End: 2022-08-10 | Stop reason: HOSPADM

## 2022-08-10 RX ORDER — MIDAZOLAM HYDROCHLORIDE 1 MG/ML
INJECTION, SOLUTION INTRAMUSCULAR; INTRAVENOUS AS NEEDED
Status: DISCONTINUED | OUTPATIENT
Start: 2022-08-10 | End: 2022-08-10 | Stop reason: HOSPADM

## 2022-08-10 RX ORDER — DIPHENHYDRAMINE HYDROCHLORIDE 50 MG/ML
INJECTION INTRAMUSCULAR; INTRAVENOUS AS NEEDED
Status: DISCONTINUED | OUTPATIENT
Start: 2022-08-10 | End: 2022-08-10 | Stop reason: HOSPADM

## 2022-08-10 RX ORDER — ACETAMINOPHEN 325 MG/1
650 TABLET ORAL EVERY 4 HOURS PRN
Status: DISCONTINUED | OUTPATIENT
Start: 2022-08-10 | End: 2022-08-10 | Stop reason: SDUPTHER

## 2022-08-10 RX ADMIN — ALBUTEROL SULFATE 1.25 MG: 2.5 SOLUTION RESPIRATORY (INHALATION) at 20:25

## 2022-08-10 RX ADMIN — GUAIFENESIN 600 MG: 600 TABLET, EXTENDED RELEASE ORAL at 09:40

## 2022-08-10 RX ADMIN — METOPROLOL TARTRATE 12.5 MG: 25 TABLET, FILM COATED ORAL at 19:57

## 2022-08-10 RX ADMIN — METHYLPREDNISOLONE SODIUM SUCCINATE 60 MG: 125 INJECTION, POWDER, FOR SOLUTION INTRAMUSCULAR; INTRAVENOUS at 15:24

## 2022-08-10 RX ADMIN — POTASSIUM CHLORIDE 40 MEQ: 10 CAPSULE, COATED, EXTENDED RELEASE ORAL at 09:34

## 2022-08-10 RX ADMIN — ATORVASTATIN CALCIUM 40 MG: 40 TABLET, FILM COATED ORAL at 19:57

## 2022-08-10 RX ADMIN — ALBUTEROL SULFATE 1.25 MG: 2.5 SOLUTION RESPIRATORY (INHALATION) at 15:28

## 2022-08-10 RX ADMIN — IPRATROPIUM BROMIDE 0.5 MG: 0.5 SOLUTION RESPIRATORY (INHALATION) at 07:17

## 2022-08-10 RX ADMIN — ALBUTEROL SULFATE 1.25 MG: 2.5 SOLUTION RESPIRATORY (INHALATION) at 07:16

## 2022-08-10 RX ADMIN — ACETAMINOPHEN 650 MG: 325 TABLET, FILM COATED ORAL at 06:07

## 2022-08-10 RX ADMIN — METOPROLOL TARTRATE 12.5 MG: 25 TABLET, FILM COATED ORAL at 09:35

## 2022-08-10 RX ADMIN — PERFLUTREN 1.17 MG: 6.52 INJECTION, SUSPENSION INTRAVENOUS at 08:46

## 2022-08-10 RX ADMIN — METHYLPREDNISOLONE SODIUM SUCCINATE 60 MG: 125 INJECTION, POWDER, FOR SOLUTION INTRAMUSCULAR; INTRAVENOUS at 19:57

## 2022-08-10 RX ADMIN — SODIUM CHLORIDE 100 ML/HR: 9 INJECTION, SOLUTION INTRAVENOUS at 14:52

## 2022-08-10 RX ADMIN — METHYLPREDNISOLONE SODIUM SUCCINATE 60 MG: 125 INJECTION, POWDER, FOR SOLUTION INTRAMUSCULAR; INTRAVENOUS at 05:59

## 2022-08-10 RX ADMIN — BUDESONIDE AND FORMOTEROL FUMARATE DIHYDRATE 2 PUFF: 160; 4.5 AEROSOL RESPIRATORY (INHALATION) at 07:17

## 2022-08-10 RX ADMIN — ALBUTEROL SULFATE 1.25 MG: 2.5 SOLUTION RESPIRATORY (INHALATION) at 10:58

## 2022-08-10 RX ADMIN — ENOXAPARIN SODIUM 30 MG: 100 INJECTION SUBCUTANEOUS at 19:59

## 2022-08-10 RX ADMIN — ASPIRIN 81 MG: 81 TABLET, COATED ORAL at 09:35

## 2022-08-10 RX ADMIN — SODIUM CHLORIDE 100 ML/HR: 9 INJECTION, SOLUTION INTRAVENOUS at 17:55

## 2022-08-10 RX ADMIN — SERTRALINE HYDROCHLORIDE 25 MG: 50 TABLET, FILM COATED ORAL at 09:35

## 2022-08-10 RX ADMIN — IPRATROPIUM BROMIDE 0.5 MG: 0.5 SOLUTION RESPIRATORY (INHALATION) at 15:28

## 2022-08-10 RX ADMIN — ACETAMINOPHEN 650 MG: 325 TABLET, FILM COATED ORAL at 16:38

## 2022-08-10 RX ADMIN — GUAIFENESIN 600 MG: 600 TABLET, EXTENDED RELEASE ORAL at 19:57

## 2022-08-10 RX ADMIN — IPRATROPIUM BROMIDE 0.5 MG: 0.5 SOLUTION RESPIRATORY (INHALATION) at 10:58

## 2022-08-10 RX ADMIN — IPRATROPIUM BROMIDE 0.5 MG: 0.5 SOLUTION RESPIRATORY (INHALATION) at 20:25

## 2022-08-10 RX ADMIN — BUDESONIDE AND FORMOTEROL FUMARATE DIHYDRATE 2 PUFF: 160; 4.5 AEROSOL RESPIRATORY (INHALATION) at 20:25

## 2022-08-10 RX ADMIN — Medication 10 ML: at 19:58

## 2022-08-10 NOTE — CASE MANAGEMENT/SOCIAL WORK
Discharge Planning Assessment  [facility]     Patient Name: Joana Cuevas  MRN: [id number]  Today's Date: 8/10/2022    Admit Date: 8/9/2022     Discharge Needs Assessment     Row Name 08/10/22 1006       Living Environment    People in Home spouse    Name(s) of People in Home Abel    Current Living Arrangements home    Primary Care Provided by spouse/significant other    Provides Primary Care For no one    Family Caregiver if Needed spouse    Family Caregiver Names Abel       Transition Planning    Patient/Family Anticipates Transition to home with family    Transportation Anticipated family or friend will provide       Discharge Needs Assessment    Readmission Within the Last 30 Days no previous admission in last 30 days    Equipment Currently Used at Home oxygen;walker, rolling    Concerns to be Addressed no discharge needs identified    Equipment Needed After Discharge none    Discharge Coordination/Progress spoke to patient who lives with spouse; has RX coverage oxygen 4LNC continuously with [employer] and PCP; independent at home prior to illness will follow for DC needs               Discharge Plan    No documentation.               Continued Care and Services - Admitted Since 8/9/2022    Coordination has not been started for this encounter.          Demographic Summary    No documentation.                Functional Status    No documentation.                Psychosocial    No documentation.                Abuse/Neglect    No documentation.                Legal    No documentation.                Substance Abuse    No documentation.                Patient Forms    No documentation.                   Mireille Cunha RN

## 2022-08-10 NOTE — PROGRESS NOTES
[facility] Medicine Services  INPATIENT PROGRESS NOTE    Patient Name: Joana Cuevas  Date of Admission: 8/9/2022  Today's Date: 08/10/22  Length of Stay: 1  Primary Care Physician: Sonu Lennon MD    Subjective   Chief Complaint: Follow-up shortness of breath  HPI   Patient just returned from cath lab.  She reports that she is feeling better.  She does not have any chest pain or shortness of breath.  Right groin is soft and nontender.  She is relieved to hear that there were no blockages.  Discussed with the patient that further recommendations will be made per cardiology.  She expressed understanding.  She does not have any new complaints at this time.  She is on 3 L nasal cannula.    Review of Systems   All pertinent negatives and positives are as above. All other systems have been reviewed and are negative unless otherwise stated.     Objective    Temp:  [97.6 °F (36.4 °C)-98.3 °F (36.8 °C)] 98.3 °F (36.8 °C)  Heart Rate:  [] 84  Resp:  [16-22] 17  BP: (117-167)/() 117/67  Physical Exam  Vitals and nursing note reviewed.   Constitutional:       General: She is not in acute distress.     Appearance: Normal appearance.   HENT:      Head: Normocephalic.   Cardiovascular:      Rate and Rhythm: Normal rate and regular rhythm.      Pulses: Normal pulses.      Heart sounds: Normal heart sounds. No murmur heard.     Comments: Heart rate 82  Pulmonary:      Effort: Pulmonary effort is normal. No respiratory distress.      Breath sounds: Normal breath sounds. No wheezing.      Comments: 3 L nasal cannula  Abdominal:      General: Bowel sounds are normal. There is no distension.      Palpations: Abdomen is soft.      Tenderness: There is no abdominal tenderness.   Musculoskeletal:         General: No swelling or tenderness. Normal range of motion.      Cervical back: Normal range of motion. No rigidity or tenderness.   Skin:     General: Skin is warm and dry.       Capillary Refill: Capillary refill takes less than 2 seconds.      Findings: No bruising, erythema or rash.      Comments: Gauze, Tegaderm to right groin.  No bruising present.   Neurological:      Mental Status: She is alert and oriented to person, place, and time. Mental status is at baseline.      Motor: Weakness present.         Results Review:  I have reviewed the labs, radiology results, and diagnostic studies.    Laboratory Data:   Results from last 7 days   Lab Units 08/10/22  0531 08/09/22  1221   WBC 10*3/mm3 5.38 11.08*   HEMOGLOBIN g/dL 12.9 13.4   HEMATOCRIT % 38.3 42.1   PLATELETS 10*3/mm3 259 265        Results from last 7 days   Lab Units 08/10/22  0531 08/09/22  1221   SODIUM mmol/L 138 141   POTASSIUM mmol/L 3.2* 3.7   CHLORIDE mmol/L 96* 97*   CO2 mmol/L 31.0* 36.0*   BUN mg/dL 9 11   CREATININE mg/dL 0.25* 0.37*   CALCIUM mg/dL 9.0 9.3   BILIRUBIN mg/dL  --  0.5   ALK PHOS U/L  --  60   ALT (SGPT) U/L  --  23   AST (SGOT) U/L  --  27   GLUCOSE mg/dL 141* 175*       Culture Data:   No results found for: BLOODCX, URINECX, WOUNDCX, MRSACX, RESPCX, STOOLCX    Radiology Data:   Imaging Results (Last 24 Hours)     Procedure Component Value Units Date/Time    XR Chest 1 View [[id number]] Collected: 08/09/22 1416     Updated: 08/09/22 1420    Narrative:      EXAMINATION: XR CHEST 1 VW- 8/9/2022 2:16 PM CDT     HISTORY: shortness of breath.     REPORT: A frontal view of the chest was obtained.     COMPARISON: Chest x-ray 3/1/2019.     The lungs are hyperinflated, there are coarse interstitial markings as  before. This is compatible with COPD. No infiltrate or consolidation is  identified. Heart size is upper limits of normal. No evidence of CHF is  identified. There is no pneumothorax or pleural effusion. Surgical clips  are noted in the axilla bilaterally.       Impression:      Stable chronic lung changes and evidence of COPD with  interstitial fibrosis. No superimposed acute infiltrates are  identified.  This report was finalized on 08/09/2022 14:17 by Dr. Driss Lopez MD.          I have reviewed the patient's current medications.     Assessment/Plan     Active Hospital Problems    Diagnosis    • **Acute respiratory failure with hypercapnia (HCC)    • COPD exacerbation (HCC)    • Hyperglycemia    • Essential hypertension    • Elevated troponin    • Pulmonary fibrosis (HCC)    • Chronic respiratory failure with hypoxia (HCC)        Plan:  1.  Patient presented to the emergency department on 8/9/2022 with complaints of shortness of breath and chest tightness.  She does have a history of COPD and had COVID in July which did not cause any difficulty with breathing.  She reports that on the morning of 8/9 she woke up with severe chest pressure that was associated with weakness.  She did feel that this episode was different from her COPD exacerbations.  She was noted to be hypertensive and hypercapnic.  Initial troponin was elevated at 0.124.  EKG on arrival was not concerning for any acute changes but repeat EKG revealed new lateral T wave inversions.    2.  Cardiology was consulted.  Echocardiogram was obtained which showed EF approximately 45% with left ventricular diastolic dysfunction.  Multiple left ventricular wall segments were hypokinetic including apical anterior, apical lateral, apical inferior, apical septal.  EKG was repeated today which showed new T wave inversions in lead II, V4, and V5.  Patient was taken to cath lab by Dr. Carroll.  Report shows normal coronary arteries with LVEF 40% with anteroapical dyskinesis consistent with Takotsubo's myocarditis.    3.  Patient has a history of COPD and wears 4 L nasal cannula continuously.  She is currently on 3 L nasal cannula.  Continue Symbicort, half-strength DuoNebs, Mucinex, IV Solu-Medrol.    4.  Blood pressure was elevated on arrival but has improved since admission.  She was started on Lopressor 12.5 mg twice daily.  Current blood pressure is  131/77.  Continue to monitor blood pressure trend and adjust medications as needed.    5.  Blood glucose has been elevated which is likely secondary to steroids.  Last blood glucose 141, 178.  Continue Accu-Cheks with sliding scale insulin.    6.  Lipid profile revealed total cholesterol 230, , .  Will begin atorvastatin 40 mg daily.    7.  Patient was started on therapeutic dose Lovenox, appropriate for DVT prophylaxis.    Discharge Planning: I expect the patient to be discharged to be determined.    Electronically signed by JOHANNE Rivera, 08/10/22, 11:55 CDT.

## 2022-08-10 NOTE — CONSULTS
"[facility] HEART GROUP CONSULT NOTE    Referring Provider: No Known Provider    Reason for Consultation: elevated troponin    Chief complaint:   Chief Complaint   Patient presents with   • Shortness of Breath   • Chest Pain       Subjective      History of present illness:  Joana Cuevas is a 71 y.o. female with chronic bronchitis, COPD, [place] syndrome, and breast cancer currently in remission who presented to the hospital with complaints of shortness of breath and chest tightness. She reports since her diagnosis of COPD in 2016 she typically has an exacerbation yearly. She recently had COVID and was fully recovered and had no breathing difficulty during that time. She notes the morning of arrival she awoke with sudden onset of severe chest pressure. She notes she has never had chest pressure to this degree in the past. She notes associated weakness with her pressure. She notes previous \"tightness\" with her COPD exacerbations and notes this episode to be \"completely different\". She denies exertional pressure prior to the sudden onset. On arrival to the ER her BP and HR were noted to be elevated with HR 130s and /114. She notes since she was placed on antihypertensives in March she has had no problem with her BP.  She was also noted to be hypercapnic per ABGs. Initial troponin was normal with repeat being elevated at 0.124 followed by 0.073 this morning. EKG on arrival was benign with repeat last night revealing presence of new lateral t wave inversions. Since arrival she has been treated for an acute COPD exacerbation with nebulizers, steroids, and mucinex. She notes her pressure has subsided and her breathing is back to her baseline. She is currently on 3L NC with her home O2 being 4L due to hypoxia with exertion. She denies a personal history of heart disease but reports an extended family history of heart disease.     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   • Laceration of head 2019   • Pneumonia    • Syncope 2019   ,   Past Surgical History:   Procedure Laterality Date   • HYSTERECTOMY     • MASTECTOMY     • TONSILECTOMY, ADENOIDECTOMY, BILATERAL MYRINGOTOMY AND TUBES     ,   Family History   Problem Relation Age of Onset   • Cancer Mother    • Diabetes Mother    • Heart failure Father    ,   Social History     Tobacco Use   • Smoking status: Former Smoker     Packs/day: 2.00     Years: 35.00     Pack years: 70.00     Types: Cigarettes     Quit date:      Years since quittin.6   • Smokeless tobacco: Never Used   Substance Use Topics   • Alcohol use: Yes     Alcohol/week: 5.0 standard drinks     Types: 5 Cans of beer per week   • Drug use: No   ,     Medications    Prior to Admission medications    Medication Sig Start Date End Date Taking? Authorizing Provider   acetaminophen (TYLENOL) 500 MG tablet Take 500 mg by mouth Every 6 (Six) Hours As Needed for Mild Pain .   Yes Mike Davidson MD   albuterol sulfate HFA (Ventolin HFA) 108 (90 Base) MCG/ACT inhaler Inhale 2 puffs Every 4 (Four) Hours As Needed for Wheezing. 22  Yes Ar Kendrick MD   Biotin 05374 MCG tablet Take 1,000 mcg by mouth Daily.   Yes Mike Davidson MD   budesonide-formoterol (SYMBICORT) 160-4.5 MCG/ACT inhaler Inhale 2 puffs 2 (Two) Times a Day.   Yes Mike Davidson MD   hydroCHLOROthiazide (HYDRODIURIL) 25 MG tablet Take 25 mg by mouth Daily. 3/14/22  Yes Mike Davidson MD   sertraline (ZOLOFT) 25 MG tablet Take 25 mg by mouth Daily.   Yes Mike Davidson MD   tiotropium (SPIRIVA) 18 MCG per inhalation capsule Place 1 capsule into inhaler and inhale Daily.   Yes Mike Davidson MD   cetirizine (zyrTEC) 10 MG tablet Take 10 mg by mouth Daily.    Mike Davidson MD       Current Facility-Administered Medications   Medication  Dose Route Frequency Provider Last Rate Last Admin   • acetaminophen (TYLENOL) tablet 650 mg  650 mg Oral Q4H PRN Yelena Abdi APRN   650 mg at 08/10/22 0607    Or   • acetaminophen (TYLENOL) suppository 650 mg  650 mg Rectal Q4H PRN Yelena Abdi APRVIPIN       • albuterol (PROVENTIL) nebulizer solution 0.5% 2.5 mg/0.5mL  1.25 mg Nebulization 4x Daily - RT Yelena Abdi APRN   1.25 mg at 08/10/22 0716    And   • ipratropium (ATROVENT) nebulizer solution 0.5 mg  0.5 mg Nebulization 4x Daily - RT Yelena Abdi APRN   0.5 mg at 08/10/22 0717   • aspirin EC tablet 81 mg  81 mg Oral Daily Xu Haque MD   81 mg at 08/10/22 0935   • budesonide-formoterol (SYMBICORT) 160-4.5 MCG/ACT inhaler 2 puff  2 puff Inhalation BID - RT Yelena Abdi APRN   2 puff at 08/10/22 0717   • cetirizine (zyrTEC) tablet 10 mg  10 mg Oral Daily Yelena Abdi APRVIPIN       • dextrose (D50W) (25 g/50 mL) IV injection 25 g  25 g Intravenous Q15 Min PRN Yelena Abdi APRVIPIN       • dextrose (GLUTOSE) oral gel 15 g  15 g Oral Q15 Min PRN Yelena Abdi APRN       • Enoxaparin Sodium (LOVENOX) syringe 80 mg  1 mg/kg Subcutaneous Q12H Tal Mendez MD   80 mg at 08/09/22 2115   • glucagon (human recombinant) (GLUCAGEN DIAGNOSTIC) injection 1 mg  1 mg Intramuscular Q15 Min PRN Yelena Abdi APRVIPIN       • guaiFENesin (MUCINEX) 12 hr tablet 600 mg  600 mg Oral Q12H Yelena Abdi APRN   600 mg at 08/10/22 0940   • Insulin Lispro (humaLOG) injection 2-7 Units  2-7 Units Subcutaneous TID AC Yelena Abdi APRN       • methylPREDNISolone sodium succinate (SOLU-Medrol) injection 60 mg  60 mg Intravenous Q8H Yelena Abdi APRN   60 mg at 08/10/22 0559   • metoprolol tartrate (LOPRESSOR) tablet 12.5 mg  12.5 mg Oral Q12H Yelena Abdi APRN   12.5 mg at 08/10/22 0935   • ondansetron (ZOFRAN) tablet 4 mg  4 mg Oral Q6H PRN Yelena Abdi APRN        Or   • ondansetron (ZOFRAN) injection 4 mg  4 mg  "Intravenous Q6H PRN Yelena Abdi APRN       • sertraline (ZOLOFT) tablet 25 mg  25 mg Oral Daily Yelena Abdi APRN   25 mg at 08/10/22 0935   • sodium chloride 0.9 % flush 10 mL  10 mL Intravenous PRN Yana Cabrera APRN       • sodium chloride 0.9 % flush 10 mL  10 mL Intravenous Q12H Yelena Abdi APRN   10 mL at 08/09/22 2116   • sodium chloride 0.9 % flush 10 mL  10 mL Intravenous PRN Yelena Abdi APRN       • sodium chloride 0.9 % infusion  75 mL/hr Intravenous Continuous Yelena Abdi APRN 75 mL/hr at 08/09/22 1903 75 mL/hr at 08/09/22 1903       Allergies:  Patient has no known allergies.    Review of Systems  Review of Systems   Constitutional: Negative for diaphoresis, fatigue and unexpected weight change.   Respiratory: Positive for chest tightness and shortness of breath. Negative for wheezing.    Cardiovascular: Negative for chest pain, palpitations and leg swelling.   Gastrointestinal: Negative for diarrhea, nausea and vomiting.   Neurological: Positive for weakness. Negative for dizziness, syncope and light-headedness.   All other systems reviewed and are negative.      Objective     Physical Exam:  Patient Vitals for the past 24 hrs:   BP Temp Temp src Pulse Resp SpO2 Height Weight   08/10/22 0726 -- -- -- 92 16 99 % -- --   08/10/22 0716 -- -- -- 87 16 97 % -- --   08/10/22 0644 121/70 97.9 °F (36.6 °C) Oral 80 18 99 % -- --   08/10/22 0443 125/75 97.9 °F (36.6 °C) Oral 83 18 98 % -- --   08/10/22 0359 -- -- -- 83 -- 98 % -- --   08/10/22 0001 130/75 97.6 °F (36.4 °C) Axillary 87 20 98 % -- --   08/09/22 2200 -- -- -- 88 22 98 % -- --   08/09/22 2042 -- -- -- -- -- 100 % -- --   08/09/22 2032 -- -- -- 105 16 98 % -- --   08/09/22 2019 126/72 98.3 °F (36.8 °C) Oral 104 18 100 % -- --   08/09/22 1600 138/82 98.2 °F (36.8 °C) Oral 114 18 96 % 160 cm (62.99\") 75 kg (165 lb 6.4 oz)   08/09/22 1516 129/77 -- -- 105 -- 94 % -- --   08/09/22 1502 127/80 -- -- 107 -- 95 % -- " "--   08/09/22 1447 (!) 127/109 -- -- 117 -- 94 % -- --   08/09/22 1431 123/72 -- -- 108 -- 94 % -- --   08/09/22 1417 133/88 -- -- 111 -- 96 % -- --   08/09/22 1402 129/83 -- -- 112 -- 96 % -- --   08/09/22 1347 121/81 -- -- 109 -- 96 % -- --   08/09/22 1332 119/78 -- -- 112 -- 97 % -- --   08/09/22 1317 123/84 -- -- 112 -- 97 % -- --   08/09/22 1302 132/81 -- -- 113 -- 97 % -- --   08/09/22 1252 -- -- -- 119 18 97 % -- --   08/09/22 1247 159/97 -- -- (!) 124 -- 96 % -- --   08/09/22 1232 (!) 157/102 -- -- (!) 126 -- 96 % -- --   08/09/22 1217 (!) 167/114 -- -- (!) 132 -- 95 % -- --   08/09/22 1215 (!) 167/114 97.6 °F (36.4 °C) Oral (!) 132 20 92 % 160 cm (63\") 76.7 kg (169 lb)     Vitals reviewed.   Constitutional:       General: Not in acute distress.     Appearance: Well-developed. Cachectic. Chronically ill-appearing. Not diaphoretic.   Eyes:      General: No scleral icterus.     Conjunctiva/sclera: Conjunctivae normal.      Pupils: Pupils are equal, round, and reactive to light.   HENT:      Head: Normocephalic.    Mouth/Throat:      Pharynx: No oropharyngeal exudate.   Neck:      Vascular: No JVR.   Pulmonary:      Effort: Pulmonary effort is normal. No respiratory distress.      Breath sounds: Decreased air movement present. Examination of the right-upper field reveals decreased breath sounds. Examination of the left-upper field reveals decreased breath sounds. Examination of the right-lower field reveals decreased breath sounds. Examination of the left-lower field reveals decreased breath sounds. Decreased breath sounds present. No wheezing. No rhonchi. No rales.   Chest:      Chest wall: Not tender to palpatation.   Cardiovascular:      Normal rate. Regular rhythm.   Pulses:     Intact distal pulses.   Edema:     Peripheral edema absent.   Abdominal:      General: Bowel sounds are normal. There is no distension.      Palpations: Abdomen is soft.      Tenderness: There is no abdominal tenderness. "   Musculoskeletal: Normal range of motion.      Cervical back: Normal range of motion and neck supple. Skin:     General: Skin is warm and dry.      Coloration: Skin is not pale.      Findings: No erythema or rash.   Neurological:      Mental Status: Alert, oriented to person, place, and time and oriented to person, place and time.      Deep Tendon Reflexes: Reflexes are normal and symmetric.   Psychiatric:         Behavior: Behavior normal.         Results Review:   I reviewed the patient's new clinical results.    Lab Results (last 72 hours)     Procedure Component Value Units Date/Time    Troponin [[id number]]  (Abnormal) Collected: 08/10/22 0531    Specimen: Blood Updated: 08/10/22 0639     Troponin T 0.073 ng/mL     Narrative:      Troponin T Reference Range:  <= 0.03 ng/mL-   Negative for AMI  >0.03 ng/mL-     Abnormal for myocardial necrosis.  Clinicians would have to utilize clinical acumen, EKG, Troponin and serial changes to determine if it is an Acute Myocardial Infarction or myocardial injury due to an underlying chronic condition.       Results may be falsely decreased if patient taking Biotin.      Basic Metabolic Panel [[id number]]  (Abnormal) Collected: 08/10/22 0531    Specimen: Blood Updated: 08/10/22 0636     Glucose 141 mg/dL      BUN 9 mg/dL      Creatinine 0.25 mg/dL      Sodium 138 mmol/L      Potassium 3.2 mmol/L      Chloride 96 mmol/L      CO2 31.0 mmol/L      Calcium 9.0 mg/dL      BUN/Creatinine Ratio 36.0     Anion Gap 11.0 mmol/L      eGFR 120.2 mL/min/1.73      Comment: National Kidney Foundation and American Society of Nephrology (ASN) Task Force recommended calculation based on the Chronic Kidney Disease Epidemiology Collaboration (CKD-EPI) equation refit without adjustment for race.       Narrative:      GFR Normal >60  Chronic Kidney Disease <60  Kidney Failure <15      Lipid Panel [[id number]]  (Abnormal) Collected: 08/10/22 0531    Specimen: Blood Updated: 08/10/22 0636     Total  Cholesterol 230 mg/dL      Triglycerides 52 mg/dL      HDL Cholesterol 107 mg/dL      LDL Cholesterol  114 mg/dL      VLDL Cholesterol 9 mg/dL      LDL/HDL Ratio 1.05    Narrative:      Cholesterol Reference Ranges  (U.S. Department of Health and Human Services ATP III Classifications)    Desirable          <200 mg/dL  Borderline High    200-239 mg/dL  High Risk          >240 mg/dL      Triglyceride Reference Ranges  (U.S. Department of Health and Human Services ATP III Classifications)    Normal           <150 mg/dL  Borderline High  150-199 mg/dL  High             200-499 mg/dL  Very High        >500 mg/dL    HDL Reference Ranges  (U.S. Department of Health and Human Services ATP III Classifications)    Low     <40 mg/dl (major risk factor for CHD)  High    >60 mg/dl ('negative' risk factor for CHD)        LDL Reference Ranges  (U.S. Department of Health and Human Services ATP III Classifications)    Optimal          <100 mg/dL  Near Optimal     100-129 mg/dL  Borderline High  130-159 mg/dL  High             160-189 mg/dL  Very High        >189 mg/dL    Hemoglobin A1c [[id number]]  (Normal) Collected: 08/10/22 0531    Specimen: Blood Updated: 08/10/22 0622     Hemoglobin A1C 5.00 %     Narrative:      Hemoglobin A1C Ranges:    Increased Risk for Diabetes  5.7% to 6.4%  Diabetes                     >= 6.5%  Diabetic Goal                < 7.0%    CBC & Differential [[id number]]  (Abnormal) Collected: 08/10/22 0531    Specimen: Blood Updated: 08/10/22 0617    Narrative:      The following orders were created for panel order CBC & Differential.  Procedure                               Abnormality         Status                     ---------                               -----------         ------                     CBC Auto Differential[[id number]]        Abnormal            Final result                 Please view results for these tests on the individual orders.    CBC Auto Differential [[id number]]  (Abnormal)  Collected: 08/10/22 0531    Specimen: Blood Updated: 08/10/22 0617     WBC 5.38 10*3/mm3      RBC 3.96 10*6/mm3      Hemoglobin 12.9 g/dL      Hematocrit 38.3 %      MCV 96.7 fL      MCH 32.6 pg      MCHC 33.7 g/dL      RDW 13.2 %      RDW-SD 47.0 fl      MPV 10.4 fL      Platelets 259 10*3/mm3      Neutrophil % 86.2 %      Lymphocyte % 11.2 %      Monocyte % 1.5 %      Eosinophil % 0.0 %      Basophil % 0.2 %      Immature Grans % 0.9 %      Neutrophils, Absolute 4.64 10*3/mm3      Lymphocytes, Absolute 0.60 10*3/mm3      Monocytes, Absolute 0.08 10*3/mm3      Eosinophils, Absolute 0.00 10*3/mm3      Basophils, Absolute 0.01 10*3/mm3      Immature Grans, Absolute 0.05 10*3/mm3      nRBC 0.0 /100 WBC     Troponin [[id number]]  (Abnormal) Collected: 08/09/22 1740    Specimen: Blood Updated: 08/09/22 1812     Troponin T 0.124 ng/mL     Narrative:      Troponin T Reference Range:  <= 0.03 ng/mL-   Negative for AMI  >0.03 ng/mL-     Abnormal for myocardial necrosis.  Clinicians would have to utilize clinical acumen, EKG, Troponin and serial changes to determine if it is an Acute Myocardial Infarction or myocardial injury due to an underlying chronic condition.       Results may be falsely decreased if patient taking Biotin.      POC Glucose Once [[id number]]  (Abnormal) Collected: 08/09/22 1800    Specimen: Blood Updated: 08/09/22 1812     Glucose 145 mg/dL      Comment: : [id number] [place]eter ID: [id number]       [place] Draw [[id number]] Collected: 08/09/22 1221    Specimen: Blood Updated: 08/09/22 1633    Narrative:      The following orders were created for panel order [place] Draw.  Procedure                               Abnormality         Status                     ---------                               -----------         ------                     Green Top (Gel)[[id number]]                                  Final result               Lavender Top[[id number]]                                     Final  result               Red Top[[id number]]                                          Final result               [place] Blood Culture Mata...[[id number]]                      Final result               Naidu Top[[id number]]                                         Final result               Light Blue Top[[id number]]                                   Final result                 Please view results for these tests on the individual orders.    Naidu Top [[id number]] Collected: 08/09/22 1221    Specimen: Blood Updated: 08/09/22 1633     Extra Tube Hold for add-ons.     Comment: Auto resulted.       Blood Culture - Blood, Hand, Right [[id number]] Collected: 08/09/22 1406    Specimen: Blood from Hand, Right Updated: 08/09/22 1423    COVID-19,[facility] IN-HOUSE,Nasal Swab No Transport Media 3-4 HR TAT - Swab, Nasal Cavity [[id number]]  (Normal) Collected: 08/09/22 1305    Specimen: Swab from Nasal Cavity Updated: 08/09/22 1410     COVID19 Not Detected    Narrative:      Fact sheet for providers: https://www.fda.gov/media/087104/download     Fact sheet for patients: https://www.fda.gov/media/874553/download    Test performed by PCR.    Consider negative results in combination with clinical observations, patient history, and epidemiological information.    Blood Gas, Arterial - [[id number]]  (Abnormal) Collected: 08/09/22 1409    Specimen: Arterial Blood Updated: 08/09/22 1409     Site Right Radial     Ming's Test Negative     pH, Arterial 7.440 pH units      pCO2, Arterial 51.8 mm Hg      Comment: 83 Value above reference range        pO2, Arterial 108.0 mm Hg      Comment: 83 Value above reference range        HCO3, Arterial 35.2 mmol/L      Comment: 83 Value above reference range        Base Excess, Arterial 9.3 mmol/L      Comment: 83 Value above reference range        O2 Saturation, Arterial 98.2 %      Temperature 37.0 C      Barometric Pressure for Blood Gas 751 mmHg      Modality BiPap     FIO2 30 %      Ventilator Mode BiPAP      Set [facility] Resp Rate 16.0     IPAP 14     Comment: Meter: [id number]     :  [id number]        EPAP 6     Collected by [id number]     pCO2, Temperature Corrected 51.8 mm Hg      pH, Temp Corrected 7.440 pH Units      pO2, Temperature Corrected 108 mm Hg     [place] Blood Culture Bottle Set [[id number]] Collected: 08/09/22 1221    Specimen: Blood from Arm, Right Updated: 08/09/22 1333     Extra Tube Hold for add-ons.     Comment: Auto resulted.       Lavender Top [[id number]] Collected: 08/09/22 1221    Specimen: Blood Updated: 08/09/22 1333     Extra Tube hold for add-on     Comment: Auto resulted       Green Top (Gel) [[id number]] Collected: 08/09/22 1221    Specimen: Blood Updated: 08/09/22 1333     Extra Tube Hold for add-ons.     Comment: Auto resulted.       Red Top [[id number]] Collected: 08/09/22 1221    Specimen: Blood Updated: 08/09/22 1333     Extra Tube Hold for add-ons.     Comment: Auto resulted.       Light Blue Top [[id number]] Collected: 08/09/22 1221    Specimen: Blood Updated: 08/09/22 1333     Extra Tube Hold for add-ons.     Comment: Auto resulted       Comprehensive Metabolic Panel [[id number]]  (Abnormal) Collected: 08/09/22 1221    Specimen: Blood Updated: 08/09/22 1303     Glucose 175 mg/dL      BUN 11 mg/dL      Creatinine 0.37 mg/dL      Sodium 141 mmol/L      Potassium 3.7 mmol/L      Chloride 97 mmol/L      CO2 36.0 mmol/L      Calcium 9.3 mg/dL      Total Protein 7.6 g/dL      Albumin 4.80 g/dL      ALT (SGPT) 23 U/L      AST (SGOT) 27 U/L      Alkaline Phosphatase 60 U/L      Total Bilirubin 0.5 mg/dL      Globulin 2.8 gm/dL      A/G Ratio 1.7 g/dL      BUN/Creatinine Ratio 29.7     Anion Gap 8.0 mmol/L      eGFR 109.3 mL/min/1.73      Comment: National Kidney Foundation and American Society of Nephrology (ASN) Task Force recommended calculation based on the Chronic Kidney Disease Epidemiology Collaboration (CKD-EPI) equation refit without adjustment for race.       Narrative:      GFR  "Normal >60  Chronic Kidney Disease <60  Kidney Failure <15      Procalcitonin [[id number]]  (Normal) Collected: 08/09/22 1221    Specimen: Blood Updated: 08/09/22 1301     Procalcitonin 0.04 ng/mL     Narrative:      As a Marker for Sepsis (Non-Neonates):    1. <0.5 ng/mL represents a low risk of severe sepsis and/or septic shock.  2. >2 ng/mL represents a high risk of severe sepsis and/or septic shock.    As a Marker for Lower Respiratory Tract Infections that require antibiotic therapy:    PCT on Admission    Antibiotic Therapy       6-12 Hrs later    >0.5                Strongly Recommended  >0.25 - <0.5        Recommended   0.1 - 0.25          Discouraged              Remeasure/reassess PCT  <0.1                Strongly Discouraged     Remeasure/reassess PCT    As 28 day mortality risk marker: \"Change in Procalcitonin Result\" (>80% or <=80%) if Day 0 (or Day 1) and Day 4 values are available. Refer to [email or web address]    Change in PCT <=80%  A decrease of PCT levels below or equal to 80% defines a positive change in PCT test result representing a higher risk for 28-day all-cause mortality of patients diagnosed with severe sepsis for septic shock.    Change in PCT >80%  A decrease of PCT levels of more than 80% defines a negative change in PCT result representing a lower risk for 28-day all-cause mortality of patients diagnosed with severe sepsis or septic shock.       Magnesium [[id number]]  (Normal) Collected: 08/09/22 1221    Specimen: Blood Updated: 08/09/22 1257     Magnesium 1.8 mg/dL     Troponin [[id number]]  (Normal) Collected: 08/09/22 1221    Specimen: Blood Updated: 08/09/22 1257     Troponin T <0.010 ng/mL     Narrative:      Troponin T Reference Range:  <= 0.03 ng/mL-   Negative for AMI  >0.03 ng/mL-     Abnormal for myocardial necrosis.  Clinicians would have to utilize clinical acumen, EKG, Troponin and serial changes to determine if it is an Acute Myocardial Infarction or " myocardial injury due to an underlying chronic condition.       Results may be falsely decreased if patient taking Biotin.      Lactic Acid, Plasma [[id number]]  (Normal) Collected: 08/09/22 1221    Specimen: Blood Updated: 08/09/22 1255     Lactate 0.9 mmol/L     BNP [[id number]]  (Normal) Collected: 08/09/22 1221    Specimen: Blood Updated: 08/09/22 1254     proBNP 209.8 pg/mL     Narrative:      Among patients with dyspnea, NT-proBNP is highly sensitive for the detection of acute congestive heart failure. In addition NT-proBNP of <300 pg/ml effectively rules out acute congestive heart failure with 99% negative predictive value.    Results may be falsely decreased if patient taking Biotin.      D-dimer, Quantitative [[id number]]  (Normal) Collected: 08/09/22 1221    Specimen: Blood Updated: 08/09/22 1247     D-Dimer, Quantitative 0.42 MCGFEU/mL     Narrative:      Reference Range is 0-0.50 MCGFEU/mL. However, results <0.50 MCGFEU/mL tends to rule out DVT or PE. Results >0.50 MCGFEU/mL are not useful in predicting absence or presence of DVT or PE.      Blood Gas, Arterial - [[id number]]  (Abnormal) Collected: 08/09/22 1240    Specimen: Arterial Blood Updated: 08/09/22 1240     Site Left Radial     Ming's Test Negative     pH, Arterial 7.326 pH units      Comment: 84 Value below reference range        pCO2, Arterial 67.2 mm Hg      Comment: 86 Value above critical limit        pO2, Arterial 105.0 mm Hg      HCO3, Arterial 35.1 mmol/L      Comment: 83 Value above reference range        Base Excess, Arterial 6.7 mmol/L      Comment: 83 Value above reference range        O2 Saturation, Arterial 98.5 %      Temperature 37.0 C      Barometric Pressure for Blood Gas 752 mmHg      Modality Nasal Cannula     Flow Rate 4.0 lpm      Ventilator Mode NA     Notified Who LENORE PEDERSEN     Notified By [id number]     Notified Time 08/09/2022 12:41     Collected by [id number]     Comment: Meter: [id number]     :  [id number]         pCO2, Temperature Corrected 67.2 mm Hg      pH, Temp Corrected 7.326 pH Units      pO2, Temperature Corrected 105 mm Hg     Blood Culture - Blood, Arm, Right [[id number]] Collected: 08/09/22 1221    Specimen: Blood from Arm, Right Updated: 08/09/22 1238    CBC & Differential [[id number]]  (Abnormal) Collected: 08/09/22 1221    Specimen: Blood Updated: 08/09/22 1234    Narrative:      The following orders were created for panel order CBC & Differential.  Procedure                               Abnormality         Status                     ---------                               -----------         ------                     CBC Auto Differential[[id number]]        Abnormal            Final result                 Please view results for these tests on the individual orders.    CBC Auto Differential [[id number]]  (Abnormal) Collected: 08/09/22 1221    Specimen: Blood Updated: 08/09/22 1234     WBC 11.08 10*3/mm3      RBC 4.16 10*6/mm3      Hemoglobin 13.4 g/dL      Hematocrit 42.1 %      .2 fL      MCH 32.2 pg      MCHC 31.8 g/dL      RDW 13.2 %      RDW-SD 49.3 fl      MPV 10.3 fL      Platelets 265 10*3/mm3      Neutrophil % 61.3 %      Lymphocyte % 22.9 %      Monocyte % 8.2 %      Eosinophil % 6.8 %      Basophil % 0.6 %      Immature Grans % 0.2 %      Neutrophils, Absolute 6.79 10*3/mm3      Lymphocytes, Absolute 2.54 10*3/mm3      Monocytes, Absolute 0.91 10*3/mm3      Eosinophils, Absolute 0.75 10*3/mm3      Basophils, Absolute 0.07 10*3/mm3      Immature Grans, Absolute 0.02 10*3/mm3      nRBC 0.0 /100 WBC           Lab Results   Component Value Date    ECHOEFEST 55 07/05/2019       Imaging Results (Last 72 Hours)     Procedure Component Value Units Date/Time    XR Chest 1 View [[id number]] Collected: 08/09/22 1416     Updated: 08/09/22 1420    Narrative:      EXAMINATION: XR CHEST 1 VW- 8/9/2022 2:16 PM CDT     HISTORY: shortness of breath.     REPORT: A frontal view of the chest was obtained.      COMPARISON: Chest x-ray 3/1/2019.     The lungs are hyperinflated, there are coarse interstitial markings as  before. This is compatible with COPD. No infiltrate or consolidation is  identified. Heart size is upper limits of normal. No evidence of CHF is  identified. There is no pneumothorax or pleural effusion. Surgical clips  are noted in the axilla bilaterally.       Impression:      Stable chronic lung changes and evidence of COPD with  interstitial fibrosis. No superimposed acute infiltrates are identified.  This report was finalized on 08/09/2022 14:17 by Dr. Driss Lopez MD.        Assessment & Plan       Acute respiratory failure with hypercapnia (HCC)    Chronic respiratory failure with hypoxia (HCC)    Pulmonary fibrosis (HCC)    COPD exacerbation (HCC)    Hyperglycemia    Essential hypertension    Elevated troponin      Plan:   Elevated troponin: initial trop was normal and trended up and peaked at 0.124. initial EKG was benign with repeat revealing new lateral Twave inversions. Will recheck EKG today. Concerns for unstable angina/NSTEMI given her symptom complaint with chest pressure being different than ever before and presence of EKG changes. Keep NPO for now. She would like to discuss possible cath with family. Will also discuss with Dr. Carroll. 2D echo completed with results pending.     Acute on chronic respiratory failure with hypoxia: improved with nebs and steroids. Treatment per attending.     HTN: elevated on arrival but has since improved. Currently on Lopressor 12.5mg BID.         Further orders per Dr. Carroll    Thank you for asking us to follow this patient with you.     Electronically signed by JOHANNE Ramires, 08/10/22, 8:13 AM CDT.    Please note this cardiology consultation note is the result of a face to face consultation with the patient, in addition to reviewing medical records at length by myself, JOHANNE Fontanez      Time: approximately 30 minutes    Addendum: repeat EKG  reveals new Twave inversions in lead II, V4 and V5. Will proceed with [facility] today per Dr. Carroll as patient is agreeable.

## 2022-08-10 NOTE — PAYOR COMM NOTE
"8/10/22 [facility] [phone]    [phone]    ER ADMIT TO  INPATIENT  ON 8/9/22        FAXING FOR INPATIENT REVIEW.      Joana Cuevas (71 y.o. Female)             Date of Birth   1950    Social Security Number       Address   [address] [address]    Home Phone   [phone]    MRN   [id number]       Mormonism   Advent    Marital Status                               Admission Date   8/9/22    Admission Type   Emergency    Admitting Provider   Xu Haque MD    Attending Provider   Xu Haque MD    Department, Room/Bed   [facility] 4B, 463/1       Discharge Date       Discharge Disposition       Discharge Destination                               Attending Provider: Xu Haque MD    Allergies: No Known Allergies    Isolation: None   Infection: COVID (rule out) (08/09/22)   Code Status: CPR   Advance Care Planning Activity    Ht: 160 cm (62.99\")   Wt: 75 kg (165 lb 6.4 oz)    Admission Cmt: None   Principal Problem: Acute respiratory failure with hypercapnia (HCC) [J96.02]                 Active Insurance as of 8/9/2022     Primary Coverage     Payor Plan Insurance Group Employer/Plan Group    ANTHEM MEDICARE REPLACEMENT ANTHEM MEDICARE ADVANTAGE [id number]     Payor Plan Address Payor Plan Phone Number Payor Plan Fax Number Effective Dates    [address] [phone]  2/1/2021 - None Entered    [facility] [phone]       Subscriber Name Subscriber Birth Date Member ID       JOANA CUEVAS 1950 [id number]                 Emergency Contacts      (Rel.) Home Phone Work Phone Mobile Phone    REDDBIRDIE (Spouse) [phone] -- --    PATI SUN (Daughter) [phone] -- --    DEV CUEVAS (Daughter) -- -- [phone]                [facility] Encounter Date/Time: 8/9/2022 [facility]   Hospital Account: [id number]    MRN: [id number]   Patient:  Joana Cuevas "   Contact Serial #: [id number]   SSN:          ENCOUNTER             Patient Class: Inpatient   Unit: [address] Service: Medicine     Bed: 463/1   Admitting Provider: Xu Haque MD   Referring Physician: Provider, No Known   Attending Provider: Xu Haque MD   Adm Diagnosis: COPD exacerbation (HCC) *               PATIENT          Name: Joana Cuevas : 1950 (71 yrs)   Address: [address] Sex: Female   City: [place] [address]   County: [facility]   Marital Status:  Ethnicity: NOT                                                                              Race: WHITE   Primary Care Provider: Sonu Lennon MD Patients Phone: Home Phone: [phone]     Mobile Phone: [phone]   EMERGENCY CONTACT   Contact Name Legal Guardian? Relationship to Patient Home Phone Work Phone   1. BIRDIE CUEVAS  2. PATI SUN No  No Spouse  Daughter [phone]           GUARANTOR            Guarantor: Joana Cuevas     : 1950   Address: [address] Sex: Female     [place] [address]     Relation to Patient: Self       Home Phone: [phone]   Guarantor ID: [id number]       Work Phone:     GUARANTOR EMPLOYER   Employer:           Status: RETIRED   COVERAGE          PRIMARY INSURANCE   Payor: [employer] MEDICARE REPLACEMENT Plan: ANTHEM MEDICARE ADVANTAGE   Group Number: [id number] Insurance Type: INDEMNITY   Subscriber Name: JOANA CUEVAS Subscriber : 1950   Subscriber ID: [id number] Coverage Address: [facility] [address]  [place] [address]   Pat. Rel. to Subscriber: Self Coverage Phone: [phone]   SECONDARY INSURANCE   Payor: N/A Plan: N/A   Group Number:   Insurance Type:     Subscriber Name:   Subscriber :     Subscriber ID:   Coverage Address:     Pat. Rel. to Subscriber:   Coverage Phone:        Contact Serial # ([id number])         August 10, 2022    Chart ID ([id number])                  Rick  JOHANNE Rock    Nurse Practitioner   Emergency Medicine   ED Provider Notes       Attested   Date of Service:  08/09/22 1220   Creation Time:  08/09/22 1220              Attested            Attestation signed by Antoine Barbour MD at 08/09/22 2156           FACE TO FACE: This visit was performed by BOTH a physician and an APC. I personally evaluated and examined the patient.   Antoine Barbour MD 8/9/2022 21:56 CDT                                   Expand AllCollapse All          Show:Clear all  [x]Manual[x]Template[]Copied    Added by:  [x]Yana Cabrera APRN      []Hover for details         Subjective      Patient is a pleasant 44-year-old female who presents ER today with complaints of shortness of breath and chest pain.  The patient states that she was sleeping this morning around 9 AM when she woke up and was feeling short of breath.  She has a history of COPD stage IV and normally wears O2 at 4 L via nasal cannula continuously.  The patient states that she has some chest tightness that just feels like there is something on her chest making it hard for her to breathe.  She is wheezing.  She tells me that she is just now getting over COVID-19.  She is diagnosed with this on July 19, 2022.  Patient normally follows with the pulmonology group at [facility].  She presents here today for further evaluation.  Received an aspirin in route per EMS.        History provided by:  Patient   used: No          Review of Systems   Respiratory: Positive for cough, chest tightness and shortness of breath.    All other systems reviewed and are negative.        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   • Laceration of head 07/05/2019   • Pneumonia     • Syncope 07/05/2019            No Known Allergies     Surgical History         Past Surgical History:    Procedure Laterality Date   • HYSTERECTOMY       • MASTECTOMY       • TONSILECTOMY, ADENOIDECTOMY, BILATERAL MYRINGOTOMY AND TUBES                      Family History   Problem Relation Age of Onset   • Cancer Mother     • Diabetes Mother     • Heart failure Father           Social History   Social History            Socioeconomic History   • Marital status:    Tobacco Use   • Smoking status: Former Smoker       Packs/day: 2.00       Years: 35.00       Pack years: 70.00       Types: Cigarettes       Quit date:        Years since quittin.6   • Smokeless tobacco: Never Used   Substance and Sexual Activity   • Alcohol use: Yes   • Drug use: No   • Sexual activity: Defer             Objective      Physical Exam  Vitals and nursing note reviewed.   Constitutional:       Appearance: She is well-developed. She is ill-appearing.   HENT:      Head: Normocephalic and atraumatic.      Mouth/Throat:      Mouth: Mucous membranes are moist.      Pharynx: Oropharynx is clear.   Eyes:      Conjunctiva/sclera: Conjunctivae normal.      Pupils: Pupils are equal, round, and reactive to light.   Cardiovascular:      Rate and Rhythm: Regular rhythm. Tachycardia present.   Pulmonary:      Breath sounds: Examination of the right-upper field reveals wheezing. Examination of the right-middle field reveals wheezing. Wheezing present.   Abdominal:      General: Bowel sounds are normal.      Palpations: Abdomen is soft.   Skin:     General: Skin is warm and dry.      Capillary Refill: Capillary refill takes less than 2 seconds.   Neurological:      General: No focal deficit present.      Mental Status: She is alert and oriented to person, place, and time.   Psychiatric:         Mood and Affect: Mood normal.         Behavior: Behavior normal.            Procedures                 ED Course      ED Course as of 22 1449   Tue Aug 09, 2022   1235 WBC(!): 11.08 [LF]   1448 Patient has greatly improved after being placed on  BiPAP and given DuoNeb and magnesium.  She was also given Solu-Medrol.  I discussed the case with Dr. Little.  He will admit the patient at this time in stable condition.  Patient was altered so seen by Dr. Barbour in the ER today who agrees with plan of care. [LF]       ED Course User Index  [LF] Yana Cabrera, APRN                              XR Chest 1 View   Final Result   Stable chronic lung changes and evidence of COPD with   interstitial fibrosis. No superimposed acute infiltrates are identified.   This report was finalized on 08/09/2022 14:17 by Dr. Driss Lopez MD.                Labs Reviewed   COMPREHENSIVE METABOLIC PANEL - Abnormal; Notable for the following components:       Result Value      Glucose 175 (*)       Creatinine 0.37 (*)       Chloride 97 (*)       CO2 36.0 (*)       BUN/Creatinine Ratio 29.7 (*)       All other components within normal limits     Narrative:      GFR Normal >60  Chronic Kidney Disease <60  Kidney Failure <15      CBC WITH AUTO DIFFERENTIAL - Abnormal; Notable for the following components:     WBC 11.08 (*)       .2 (*)       Eosinophil % 6.8 (*)       Monocytes, Absolute 0.91 (*)       Eosinophils, Absolute 0.75 (*)       All other components within normal limits   BLOOD GAS, ARTERIAL - Abnormal; Notable for the following components:     pH, Arterial 7.326 (*)       pCO2, Arterial 67.2 (*)       HCO3, Arterial 35.1 (*)       Base Excess, Arterial 6.7 (*)       pCO2, Temperature Corrected 67.2 (*)       pH, Temp Corrected 7.326 (*)       All other components within normal limits   BLOOD GAS, ARTERIAL - Abnormal; Notable for the following components:     pCO2, Arterial 51.8 (*)       HCO3, Arterial 35.2 (*)       Base Excess, Arterial 9.3 (*)       pCO2, Temperature Corrected 51.8 (*)       All other components within normal limits   COVID-19,PAT BIO IN-HOUSE,NASAL SWAB NO TRANSPORT MEDIA 2 HR TAT - Normal     Narrative:      Fact sheet for providers:  https://www.fda.gov/media/148620/download      Fact sheet for patients: https://www.fda.gov/media/152141/download     Test performed by PCR.     Consider negative results in combination with clinical observations, patient history, and epidemiological information.   BNP (IN-HOUSE) - Normal     Narrative:      Among patients with dyspnea, NT-proBNP is highly sensitive for the detection of acute congestive heart failure. In addition NT-proBNP of <300 pg/ml effectively rules out acute congestive heart failure with 99% negative predictive value.     Results may be falsely decreased if patient taking Biotin.      D-DIMER, QUANTITATIVE - Normal     Narrative:      Reference Range is 0-0.50 MCGFEU/mL. However, results <0.50 MCGFEU/mL tends to rule out DVT or PE. Results >0.50 MCGFEU/mL are not useful in predicting absence or presence of DVT or PE.      TROPONIN (IN-HOUSE) - Normal     Narrative:      Troponin T Reference Range:  <= 0.03 ng/mL-   Negative for AMI       Final diagnoses:   COPD exacerbation (HCC)         ED Disposition         ED Disposition             ED Disposition   Decision to Admit    Condition   --    Comment   Level of Care: Telemetry [5]   Diagnosis: COPD exacerbation (HCC) [215351]   Admitting Physician: JOSE AMOR [[id number]]   Attending Physician: JOSE AMOR [[id number]]   Isolate for COVID?: No [0]   Certification: I Certify That Inpatient Hospital Services Are Medically Necessary For Greater Than 2 Midnights                  Yelena Abdi APRN    Nurse Practitioner   Hospitalist   H&P       Attested   Date of Service:  08/09/22 1545   Creation Time:  08/09/22 1545              Attested            Attestation signed by Xu Haque MD at 08/10/22 0905     I personally evaluated and examined the patient face to face in conjunction with the APC/resident and agree with the management and disposition of the patient. My key findings are:     • History: Patient complains of chest  pain and shortness of breath that started on the day of admission.  She had hypoxic and hypercapnic respiratory failure.  Shortly after admission, she had elevated troponin without EKG changes.  She denies chest pain at this time.     PHYSICAL EXAM     Constitutional: She is oriented to person, place, and time.  She appears well-developed and well-nourished.   HENT:   Head: Normocephalic and atraumatic.   Cardiovascular: Normal rate and regular rhythm.   Pulmonary/Chest: Effort normal and reduced breath sounds with bilateral scattered wheezing.  No stridor. No respiratory distress.   Abdominal: Soft. Bowel sounds are normal.  She exhibits no distension. There is no tenderness.   Neurological: She is alert and oriented to person, place, and time.   Skin: Skin is warm and dry.   Psychiatric: She has a normal mood and affect.     • Assessment and plan:  Acute respiratory failure with hypoxia and hypercapnia  COPD exacerbation  Elevated troponin and suspected NSTEMI  Pulmonary fibrosis     Plan  Admit as inpatient.  Continue oxygen by BiPAP and nasal cannula and titrate to keep O2 sat greater than 90%.  Continue steroids, bronchodilator nebulizer treatments.  Start p.o. metoprolol, aspirin and heparin drip due to elevated troponin, shortness of breath and chest discomfort suspicious for NSTEMI.  We will have cardiology consultation and have echocardiogram.  Gentle hydration with normal saline.                                   Expand AllCollapse All            Show:Clear all  [x]Manual[x]Template[]Copied    Added by:  [x]Yelena Abdi APRN      []Saul for details         [facility] Medicine Services  HISTORY AND PHYSICAL     Date of Admission: 8/9/2022  Primary Care Physician: Sonu Lennon MD     Subjective      Chief Complaint: Shortness of breathing     History of Present Illness  Joana Cuevas is a 71-year-old female with a past medical history of breast cancer, chronic  bronchitis, Camelia Barré syndrome, pneumonia, syncope and COPD.  She does have a history of smoking in the past.  She stopped several years ago.  She presented to [facility] emergency department via EMS with complaints of shortness of breathing and chest pain.  Chest pain was described as tightness.  Patient wears oxygen 4 L continuously.  She did utilize her albuterol inhaler without improvement.  Patient diagnosed with COVID July 19 however she said all her symptoms have resolved.  Currently she is feeling much better tolerating BiPAP without complaint.  Wheezing noted throughout.  Patient is followed by Dr. Ar Kendrick pulmonology as outpatient.  She has no other complaints.  She is admitted for further evaluation treatment.     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     Social History:  reports that she quit smoking about 7 years ago. Her smoking use included cigarettes. She has a 70.00 pack-year smoking history. She has never used smokeless tobacco. She reports current alcohol use of about 5.0 standard drinks of alcohol per week. She reports that she does not use drugs.     Code Status: Full, if unable speak for herself her  or children will speak for her        Allergies:  No Known  "Allergies     Medications:          Prior to Admission medications    Medication Sig Start Date End Date Taking? Authorizing Provider   acetaminophen (TYLENOL) 500 MG tablet Take 500 mg by mouth Every 6 (Six) Hours As Needed for Mild Pain .       Mike Davidson MD   albuterol sulfate HFA (Ventolin HFA) 108 (90 Base) MCG/ACT inhaler Inhale 2 puffs Every 4 (Four) Hours As Needed for Wheezing. 1/20/22     Ar Kendrick MD   Baclofen 5 MG tablet TAKE 1 TABLET 3 TIMES A DAY BY MOUTH AS NEEDED. 3/14/22     Mike Davidson MD   Biotin 97271 MCG tablet Take 1,000 mcg by mouth Daily.       Mike Davidson MD   budesonide-formoterol (Symbicort) 160-4.5 MCG/ACT inhaler Inhale 2 puffs 2 (Two) Times a Day for 90 days. 3/31/22 6/29/22   Ar Kendrick MD   clobetasol (TEMOVATE) 0.05 % cream clobetasol 0.05 % topical cream   APPLY A THIN LAYER TO THE AFFECTED AREA(S) BY TOPICAL ROUTE 2 TIMES PER DAY       Mike Davidson MD   hydroCHLOROthiazide (HYDRODIURIL) 25 MG tablet Take 25 mg by mouth Daily. 3/14/22     Mike Davidson MD   sertraline (ZOLOFT) 25 MG tablet Daily.       Mike Davidson MD   Spiriva HandiHaler 18 MCG per inhalation capsule INHALE THE CONTENTS OF ONE CAPSULE DAILY 6/6/22     Ar Kendrick MD      I have utilized all available immediate resources to obtain, update, and review the patient's current medications.     Objective      /72 (BP Location: Left arm, Patient Position: Sitting)   Pulse 88   Temp 98.3 °F (36.8 °C) (Oral)   Resp 22   Ht 160 cm (62.99\")   Wt 75 kg (165 lb 6.4 oz)   SpO2 98%   BMI 29.31 kg/m²   Physical Exam  Vitals reviewed.   HENT:      Head: Normocephalic and atraumatic.      Mouth/Throat:      Mouth: Mucous membranes are dry.      Pharynx: Oropharynx is clear.   Eyes:      Extraocular Movements: Extraocular movements intact.      Conjunctiva/sclera: Conjunctivae normal.   Cardiovascular:      Rate and Rhythm: Regular " rhythm. Tachycardia present.   Pulmonary:      Effort: Pulmonary effort is normal.      Breath sounds: Wheezing present.   Abdominal:      General: There is no distension.      Palpations: Abdomen is soft.      Tenderness: There is no abdominal tenderness.   Musculoskeletal:         General: Normal range of motion.      Cervical back: Normal range of motion and neck supple.      Right lower leg: No edema.      Left lower leg: No edema.   Skin:     General: Skin is warm and dry.   Neurological:      General: No focal deficit present.      Mental Status: She is alert and oriented to person, place, and time.   Psychiatric:         Mood and Affect: Mood normal.         Behavior: Behavior normal.         Pertinent Data:            Lab Results (last 72 hours)      Procedure Component Value Units Date/Time     Blood Culture - Blood, Hand, Right [[id number]] Collected: 08/09/22 1406     Specimen: Blood from Hand, Right Updated: 08/09/22 1423     COVID-19,[employer] IN-HOUSE,Nasal Swab No Transport Media 3-4 HR TAT - Swab, Nasal Cavity [[id number]]  (Normal) Collected: 08/09/22 1305     Specimen: Swab from Nasal Cavity Updated: 08/09/22 1410       COVID19 Not Detected     Blood Gas, Arterial - [[id number]]  (Abnormal) Collected: 08/09/22 1409     Specimen: Arterial Blood Updated: 08/09/22 1409       Site Right Radial       Ming's Test Negative       pH, Arterial 7.440 pH units         pCO2, Arterial 51.8 mm Hg         Comment: 83 Value above reference range          pO2, Arterial 108.0 mm Hg         Comment: 83 Value above reference range          HCO3, Arterial 35.2 mmol/L         Comment: 83 Value above reference range          Base Excess, Arterial 9.3 mmol/L         Comment: 83 Value above reference range          O2 Saturation, Arterial 98.2 %         Temperature 37.0 C         Barometric Pressure for Blood Gas 751 mmHg         Modality BiPap       FIO2 30 %         Ventilator Mode BiPAP       Set [facility] Resp Rate 16.0        [facility] 14       Comment: Meter: [id number]     :  [id number]          EPAP 6       Collected by [id number]       pCO2, Temperature Corrected 51.8 mm Hg         pH, Temp Corrected 7.440 pH Units         pO2, Temperature Corrected 108 mm Hg       Comprehensive Metabolic Panel [[id number]]  (Abnormal) Collected: 08/09/22 1221     Specimen: Blood Updated: 08/09/22 1303       Glucose 175 mg/dL         BUN 11 mg/dL         Creatinine 0.37 mg/dL         Sodium 141 mmol/L         Potassium 3.7 mmol/L         Chloride 97 mmol/L         CO2 36.0 mmol/L         Calcium 9.3 mg/dL         Total Protein 7.6 g/dL         Albumin 4.80 g/dL         ALT (SGPT) 23 U/L         AST (SGOT) 27 U/L         Alkaline Phosphatase 60 U/L         Total Bilirubin 0.5 mg/dL         Globulin 2.8 gm/dL         A/G Ratio 1.7 g/dL         BUN/Creatinine Ratio 29.7       Anion Gap 8.0 mmol/L         eGFR 109.3 mL/min/1.73       Procalcitonin [[id number]]  (Normal) Collected: 08/09/22 1221     Specimen: Blood Updated: 08/09/22 1301       Procalcitonin 0.04 ng/mL       Magnesium [[id number]]  (Normal) Collected: 08/09/22 1221     Specimen: Blood Updated: 08/09/22 1257       Magnesium 1.8 mg/dL       Troponin [[id number]]  (Normal) Collected: 08/09/22 1221     Specimen: Blood Updated: 08/09/22 1257       Troponin T <0.010 ng/mL       Lactic Acid, Plasma [[id number]]  (Normal) Collected: 08/09/22 1221     Specimen: Blood Updated: 08/09/22 1255       Lactate 0.9 mmol/L       BNP [[id number]]  (Normal) Collected: 08/09/22 1221     Specimen: Blood Updated: 08/09/22 1254       proBNP 209.8 pg/mL       D-dimer, Quantitative [[id number]]  (Normal) Collected: 08/09/22 1221     Specimen: Blood Updated: 08/09/22 1247       D-Dimer, Quantitative 0.42 MCGFEU/mL       Blood Gas, Arterial - [[id number]]  (Abnormal) Collected: 08/09/22 1240     Specimen: Arterial Blood Updated: 08/09/22 1240       Site Left Radial       Ming's Test Negative       pH, Arterial 7.326 pH  units         Comment: 84 Value below reference range          pCO2, Arterial 67.2 mm Hg         Comment: 86 Value above critical limit          pO2, Arterial 105.0 mm Hg         HCO3, Arterial 35.1 mmol/L         Comment: 83 Value above reference range          Base Excess, Arterial 6.7 mmol/L         Comment: 83 Value above reference range          O2 Saturation, Arterial 98.5 %         Temperature 37.0 C         Barometric Pressure for Blood Gas 752 mmHg         Modality Nasal Cannula       Flow Rate 4.0 lpm         Ventilator Mode NA       Notified Who LENORE BECERRAP       Notified By [id number]       Notified Time 08/09/2022 12:41       Collected by [id number]       Comment: Meter: [id number]     :  [id number]          pCO2, Temperature Corrected 67.2 mm Hg         pH, Temp Corrected 7.326 pH Units         pO2, Temperature Corrected 105 mm Hg       Blood Culture - Blood, Arm, Right [[id number]] Collected: 08/09/22 1221     Specimen: Blood from Arm, Right Updated: 08/09/22 1238     CBC Auto Differential [[id number]]  (Abnormal) Collected: 08/09/22 1221     Specimen: Blood Updated: 08/09/22 1234       WBC 11.08 10*3/mm3         RBC 4.16 10*6/mm3         Hemoglobin 13.4 g/dL         Hematocrit 42.1 %         .2 fL         MCH 32.2 pg         MCHC 31.8 g/dL         RDW 13.2 %         RDW-SD 49.3 fl         MPV 10.3 fL         Platelets 265 10*3/mm3         Neutrophil % 61.3 %         Lymphocyte % 22.9 %         Monocyte % 8.2 %         Eosinophil % 6.8 %         Basophil % 0.6 %         Immature Grans % 0.2 %         Neutrophils, Absolute 6.79 10*3/mm3         Lymphocytes, Absolute 2.54 10*3/mm3         Monocytes, Absolute 0.91 10*3/mm3         Eosinophils, Absolute 0.75 10*3/mm3         Basophils, Absolute 0.07 10*3/mm3         Immature Grans, Absolute 0.02 10*3/mm3         nRBC 0.0 /100 WBC                        Imaging Results (Last 24 Hours)      Procedure Component Value Units Date/Time     XR  Chest 1 View [[id number]] Collected: 08/09/22 1416       Updated: 08/09/22 1420     Narrative:       EXAMINATION: XR CHEST 1 VW- 8/9/2022 2:16 PM CDT     HISTORY: shortness of breath.     REPORT: A frontal view of the chest was obtained.     COMPARISON: Chest x-ray 3/1/2019.     The lungs are hyperinflated, there are coarse interstitial markings as  before. This is compatible with COPD. No infiltrate or consolidation is  identified. Heart size is upper limits of normal. No evidence of CHF is  identified. There is no pneumothorax or pleural effusion. Surgical clips  are noted in the axilla bilaterally.        Impression:       Stable chronic lung changes and evidence of COPD with  interstitial fibrosis. No superimposed acute infiltrates are identified.  This report was finalized on 08/09/2022 14:17 by Dr. Driss Lopez MD.             Assessment / Plan      Assessment:        Active Hospital Problems     Diagnosis     • **Acute respiratory failure with hypercapnia (HCC)     • COPD exacerbation (HCC)     • Hyperglycemia     • Essential hypertension     • Elevated troponin     • Pulmonary fibrosis (HCC)     • Chronic respiratory failure with hypoxia (HCC)           Plan:   1.  Admit as inpatient  2.  RT to manage BiPAP, ABGs as needed, transition to home 4 L nasal cannula as tolerated, incentive spirometry, continuous pulse   BiPAP, ABGs as needed, transition to home 4 L nasal cannula as tolerated, incentive spirometry, continuous pulse oximetry  3.  Solu-Medrol 60 mg IV every 8 hours, Mucinex, half-strength DuoNebs 4 times a day, Symbicort  4.  Home medications reviewed and restarted as appropriate, hold HCTZ  5.  Start metoprolol tartrate 12.5 mg twice daily for blood pressure management  6.  Monitor glucose AC with regular insulin sliding scale coverage  7.  DVT prophylaxis with Lovenox  8.  Gentle hydration with normal saline 75 mL/hour  9.  Repeat troponin x2, cardiac monitoring        I discussed the patient's  "findings and my recommendations with: Xu Haque MD        Patient seen and examined by me on 8/9/2022 at 3:45 PM.     Electronically signed by JOHANNE Malone, 08/09/22, 23:27 CDT.            Cosigned by: Xu Haque MD at 08/10/22 0905                   Juan, Alexa L, APRN    Nurse Practitioner   Cardiology   Consults       Cosign Needed   Date of Service:  08/10/22 0812   Creation Time:  08/10/22 0812           Consult Orders   Inpatient Cardiology Consult [[id number]] ordered by Xu Haque MD at 08/09/22 1822          Cosign Needed        Expand AllCollapse All          Show:Clear all  [x]Manual[x]Template[]Copied    Added by:  [x]Alexa Martinez APRN      []Saul for details      [facility] CONSULT NOTE     Referring Provider: No Known Provider     Reason for Consultation: elevated troponin     Chief complaint:       Chief Complaint   Patient presents with   • Shortness of Breath   • Chest Pain            Subjective          History of present illness:  Joana Cuevas is a 71 y.o. female with chronic bronchitis, COPD, [place] Elida syndrome, and breast cancer currently in remission who presented to the hospital with complaints of shortness of breath and chest tightness. She reports since her diagnosis of COPD in 2016 she typically has an exacerbation yearly. She recently had COVID and was fully recovered and had no breathing difficulty during that time. She notes the morning of arrival she awoke with sudden onset of severe chest pressure. She notes she has never had chest pressure to this degree in the past. She notes associated weakness with her pressure. She notes previous \"tightness\" with her COPD exacerbations and notes this episode to be \"completely different\". She denies exertional pressure prior to the sudden onset. On arrival to the ER her BP and HR were noted to be elevated with HR 130s and /114. She notes since she was placed on " antihypertensives in March she has had no problem with her BP.  She was also noted to be hypercapnic per ABGs. Initial troponin was normal with repeat being elevated at 0.124 followed by 0.073 this morning. EKG on arrival was benign with repeat last night revealing presence of new lateral t wave inversions. Since arrival she has been treated for an acute COPD exacerbation with nebulizers, steroids, and mucinex. She notes her pressure has subsided and her breathing is back to her baseline. She is currently on 3L NC with her home O2 being 4L due to hypoxia with exertion. She denies a personal history of heart disease but reports an extended family history of heart disease.      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     Review of Systems  Review of Systems   Constitutional: Negative for diaphoresis, fatigue and unexpected weight change.   Respiratory: Positive for chest tightness and shortness of breath. Negative for wheezing.    Cardiovascular: Negative for chest pain, palpitations and leg swelling.   Gastrointestinal: Negative for diarrhea, nausea and vomiting.   Neurological: Positive for weakness. Negative for dizziness, syncope and light-headedness.   All other systems reviewed and are negative.              Objective         Physical Exam:  Patient Vitals for the past 24 hrs:    BP Temp Temp src Pulse Resp SpO2 Height Weight   08/10/22 0726 -- -- -- 92 16 99 % -- --   08/10/22 0716             08/09/22 1252 -- -- -- 119 18 97 % -- --   08/09/22  "1247 159/97 -- -- (!) 124 -- 96 % -- --   08/09/22 1232 (!) 157/102 -- -- (!) 126 -- 96 % -- --   08/09/22 1217 (!) 167/114 -- -- (!) 132 -- 95 % -- --   08/09/22 1215 (!) 167/114 97.6 °F (36.4 °C) Oral (!) 132 20 92 % 160 cm (63\") 76.7 kg (169 lb)      Vitals reviewed.   Constitutional:       General: Not in acute distress.     Appearance: Well-developed. Cachectic. Chronically ill-appearing. Not diaphoretic.   Eyes:      General: No scleral icterus.     Conjunctiva/sclera: Conjunctivae normal.      Pupils: Pupils are equal, round, and reactive to light.   HENT:      Head: Normocephalic.    Mouth/Throat:      Pharynx: No oropharyngeal exudate.   Neck:      Vascular: No JVR.   Pulmonary:               Pulmonary:      Effort: Pulmonary effort is normal. No respiratory distress.      Breath sounds: Decreased air movement present. Examination of the right-upper field reveals decreased breath sounds. Examination of the left-upper field reveals decreased breath sounds. Examination of the right-lower field reveals decreased breath sounds. Examination of the left-lower field reveals decreased breath sounds. Decreased breath sounds present. No wheezing. No rhonchi. No rales.   Chest:      Chest wall: Not tender to palpatation.   Cardiovascular:      Normal rate. Regular rhythm.   Pulses:     Intact distal pulses.   Edema:     Peripheral edema absent.   Abdominal:      General: Bowel sounds are normal. There is no distension.      Palpations: Abdomen is soft.      Tenderness: There is no abdominal tenderness.   Musculoskeletal: Normal range of motion.      Cervical back: Normal range of motion and neck supple. Skin:     General: Skin is warm and dry.      Coloration: Skin is not pale.      Findings: No erythema or rash.   Neurological:      Mental Status: Alert, oriented to person, place, and time and oriented to person, place and time.      Deep Tendon Reflexes: Reflexes are normal and symmetric.   Psychiatric:         " Behavior: Behavior normal.            Results Review:              I reviewed the patient's new clinical results.              Lab Results (last 72 hours)      Procedure Component Value Units Date/Time     Troponin [[id number]]  (Abnormal) Collected: 08/10/22 0531     Specimen: Blood Updated: 08/10/22 0639       Troponin T 0.073 ng/mL       Narrative:       Panel [[id number]]  (Abnormal) Collected: 08/10/22 0531      Specimen: Blood Updated: 08/10/22 0636       Glucose 141 mg/dL         BUN 9 mg/dL         Creatinine 0.25 mg/dL         Sodium 138 mmol/L         Potassium 3.2 mmol/L         Chloride 96 mmol/L         CO2 31.0 mmol/L         Calcium 9.0 mg/dL         BUN/Creatinine Ratio 36.0       Anion Gap 11.0 mmol/L         eGFR 120.2 mL/min/1.73         Comment: National Kidney Foundation and American Society of Nephrology (ASN) Task Force recommended calculation based on the Chronic Kidney Disease Epidemiology Collaboration (CKD-EPI) equation refit without adjustment for race.        Narrative:       GFR Normal >60  Chronic Kidney Disease <60  Kidney Failure <15        Lipid Panel [[id number]]  (Abnormal) Collected: 08/10/22 0531     Specimen: Blood Updated: 08/10/22 0636       Total Cholesterol 230 mg/dL         Triglycerides 52 mg/dL         HDL Cholesterol 107 mg/dL         LDL Cholesterol  114 mg/dL         VLDL Cholesterol 9 mg/dL         LDL/HDL Ratio 1.05     Narrative:       for these tests on the individual orders.      CBC Auto Differential [[id number]]  (Abnormal) Collected: 08/10/22 0531     Specimen: Blood Updated: 08/10/22 0617       WBC 5.38 10*3/mm3         RBC 3.96 10*6/mm3         Hemoglobin 12.9 g/dL         Hematocrit 38.3 %         MCV 96.7 fL         MCH 32.6 pg         MCHC 33.7 g/dL         RDW 13.2 %         RDW-SD 47.0 fl         MPV 10.4 fL         Platelets 259 10*3/mm3         Neutrophil % 86.2 %         Lymphocyte % 11.2 %         Monocyte % 1.5 %         Eosinophil % 0.0 %          Basophil % 0.2 %         Immature Grans % 0.9 %         Neutrophils, Absolute 4.64 10*3/mm3         Lymphocytes, Absolute 0.60 10*3/mm3         Monocytes, Absolute 0.08 10*3/mm3         Eosinophils, Absolute 0.00 10*3/mm3         Basophils, Absolute 0.01 10*3/mm3         Immature Grans, Absolute 0.05 10*3/mm3         nRBC 0.0 /100 WBC       Troponin [[id number]]  (Abnormal) Collected: 08/09/22 1740     Specimen: Blood Updated: 08/09/22 1812       Troponin T 0.124 ng/mL       Narrative:           Acute respiratory failure with hypercapnia (HCC)    Chronic respiratory failure with hypoxia (HCC)    Pulmonary fibrosis (HCC)    COPD exacerbation (HCC)    Hyperglycemia    Essential hypertension    Elevated troponin        Plan:   Elevated troponin: initial trop was normal and trended up and peaked at 0.124. initial EKG was benign with repeat revealing new lateral Twave inversions. Will recheck EKG today. Concerns for unstable angina/NSTEMI given her symptom complaint with chest pressure being different than ever before and presence of EKG changes. Keep NPO for now. She would like to discuss possible cath with family. Will also discuss with Dr. Carroll. 2D echo completed with results pending.      Acute on chronic respiratory failure with hypoxia: improved with nebs and steroids. Treatment per attending.      HTN: elevated on arrival but has since improved. Currently on Lopressor 12.5mg BID.            Further orders per Dr. Carroll     Thank you for asking us to follow this patient with you.      Electronically signed by JOHANNE Ramires, 08/10/22, 8:13 AM CDT.     Please note this cardiology consultation note is the result of a face to face consultation with the patient, in addition to reviewing medical records at length by myself, JOHANNE Fontanez      Time: approximately 30 minutes     Addendum: repeat EKG reveals new Twave inversions in lead II, V4 and V5. Will proceed with C today per Dr. Carroll as patient is  agreeable.         08/09/22 1252 -- 119 18 -- -- NPPV/NIV -- -- 97   08/09/22 1247 -- 124 Abnormal  -- 159/97 -- -- -- -- 96   08/09/22 1232 -- 126 Abnormal  -- 157/102 Abnormal  -- -- -- -- 96   08/09/22 1217 -- 132 Abnormal  -- 167/114 Abnormal  -- -- -- -- 95   08/09/22 1215 97.6 (36.4) 132 Abnormal  20 167/114 Abnormal  Sitting nasal cannula 4           10/22 0359 -- 83 -- -- -- nasal cannula 4 -- 98   08/10/22 0001 97.6 (36.4) 87 20 130/75 Lying NPPV/NIV -- -- 98   08/09                              Plan:   1.  Admit as inpatient  2.  RT to manage BiPAP, ABGs as needed, transition to home 4 L nasal cannula as tolerated, incentive spirometry, continuous pulse oximetry  3.  Solu-Medrol 60 mg IV every 8 hours, Mucinex, half-strength DuoNebs 4 times a day, Symbicort  4.  Home medications reviewed and restarted as appropriate, hold HCTZ  5.  Start metoprolol tartrate 12.5 mg twice daily for blood pressure management  6.  Monitor glucose AC with regular insulin sliding scale coverage  7.  DVT prophylaxis with Lovenox  8.  Gentle hydration with normal saline 75 mL/hour  9.  Repeat troponin x2, cardiac monitoring        I discussed the patient's findings and my recommendations with: Xu Haque MD        Patient seen and examined by me on 8/9/2022 at 3:45 PM.     Electronically signed by JOHANNE Malone, 08/09/22, 23:27 CDT.            Cosigned by: Xu Haque MD at 08/10/22 0905       Acute respiratory failure with hypercapnia (HCC)    Chronic respiratory failure with hypoxia (HCC)    Pulmonary fibrosis (HCC)    COPD exacerbation (HCC)    Hyperglycemia    Essential hypertension    Elevated troponin        Plan:   Elevated troponin: initial trop was normal and trended up and peaked at 0.124. initial EKG was benign with repeat revealing new lateral Twave inversions. Will recheck EKG today. Concerns for unstable angina/NSTEMI given her symptom complaint with chest pressure being different than ever  before and presence of EKG changes. Keep NPO for now. She would like to discuss possible cath with family. Will also discuss with Dr. Carroll. 2D echo completed with results pending.      Acute on chronic respiratory failure with hypoxia: improved with nebs and steroids. Treatment per attending.      HTN: elevated on arrival but has since improved. Currently on Lopressor 12.5mg BID.            Further orders per Dr. Carroll     Thank you for asking us to follow this patient with you.      Electronically signed by JOHANNE Ramires, 08/10/22, 8:13 AM CDT.     Please note this cardiology consultation note is the result of a face to face consultation with the patient, in addition to reviewing medical records at length by myself, JOHANNE Fontanez      Time: approximately 30 minutes     Addendum: repeat EKG reveals new Twave inversions in lead II, V4 and V5. Will proceed with [facility] today per Dr. Carroll as patient is agreeable.        Start   Ordered Stop   08/09/22 1730  sodium chloride 0.9 % infusion  75 mL/hr,   Intravenous,   Continuous                   heparin (porcine) injection 2,250 Units  Dose: 30 Units/kg  Weight Dosing Info: 75 kg  Freq: As Needed Route: IV  PRN Comment: bolus dose per heparin nomogram  Indications of Use: ACUTE CORONARY SYNDROME  Start: 08/09/22 1823 End: 08/09/22 1915   Admin Instructions:   Bolus for aPTT 44-51 seconds.  Maximum dose 2,000 units.                    Current Facility-Administered Medications   Medication Dose Route Frequency Provider Last Rate Last Admin   • acetaminophen (TYLENOL) tablet 650 mg  650 mg Oral Q4H PRN Yelena Abdi APRN   650 mg at 08/10/22 0607    Or   • acetaminophen (TYLENOL) suppository 650 mg  650 mg Rectal Q4H PRN Yelena Abdi APRN       • albuterol (PROVENTIL) nebulizer solution 0.5% 2.5 mg/0.5mL  1.25 mg Nebulization 4x Daily - RT Yelena Abdi APRN   1.25 mg at 08/10/22 1058    And   • ipratropium (ATROVENT) nebulizer solution 0.5 mg  0.5 mg  Nebulization 4x Daily - RT Yelena Abdi APRN   0.5 mg at 08/10/22 1058   • aspirin EC tablet 81 mg  81 mg Oral Daily Xu Haque MD   81 mg at 08/10/22 0935   • budesonide-formoterol (SYMBICORT) 160-4.5 MCG/ACT inhaler 2 puff  2 puff Inhalation BID - RT Yelena Abdi APRN   2 puff at 08/10/22 0717   • cetirizine (zyrTEC) tablet 10 mg  10 mg Oral Daily Yelena Abdi, APRVIPIN       • dextrose (D50W) (25 g/50 mL) IV injection 25 g  25 g Intravenous Q15 Min PRN Yelena Abdi APRN       • dextrose (GLUTOSE) oral gel 15 g  15 g Oral Q15 Min PRN Yelena Abdi, APRVIPIN       • Enoxaparin Sodium (LOVENOX) syringe 80 mg  1 mg/kg Subcutaneous Q12H Tal Mendez MD   80 mg at 08/09/22 2115   • glucagon (human recombinant) (GLUCAGEN DIAGNOSTIC) injection 1 mg  1 mg Intramuscular Q15 Min PRN Yelena Abdi APRVIPIN       • guaiFENesin (MUCINEX) 12 hr tablet 600 mg  600 mg Oral Q12H Yelena Abdi APRN   600 mg at 08/10/22 0940   • Insulin Lispro (humaLOG) injection 2-7 Units  2-7 Units Subcutaneous TID AC Yelena Abdi APRN       • methylPREDNISolone sodium succinate (SOLU-Medrol) injection 60 mg  60 mg Intravenous Q8H Yelena Abdi APRN   60 mg at 08/10/22 0559   • metoprolol tartrate (LOPRESSOR) tablet 12.5 mg  12.5 mg Oral Q12H Yelena Abdi APRN   12.5 mg at 08/10/22 0935   • ondansetron (ZOFRAN) tablet 4 mg  4 mg Oral Q6H PRN Yelena Abdi APRN        Or   • ondansetron (ZOFRAN) injection 4 mg  4 mg Intravenous Q6H PRN Yelena Abdi, APRN       • sertraline (ZOLOFT) tablet 25 mg  25 mg Oral Daily Yelena Abdi APRN   25 mg at 08/10/22 0935   • sodium chloride 0.9 % flush 10 mL  10 mL Intravenous PRN Yana Cabrera APRN       • sodium chloride 0.9 % flush 10 mL  10 mL Intravenous Q12H Yelena Abdi APRN   10 mL at 08/09/22 2116   • sodium chloride 0.9 % flush 10 mL  10 mL Intravenous PRN Yelena Abdi APRN       • sodium chloride 0.9 % infusion  75  mL/hr Intravenous Continuous Yelena Abdi, JOHANNE 75 mL/hr at 08/09/22 1903 75 mL/hr at 08/09/22 1903

## 2022-08-10 NOTE — PLAN OF CARE
Problem: Adult Inpatient Plan of Care  Goal: Plan of Care Review  Outcome: Ongoing, Progressing  Flowsheets (Taken 8/10/2022 2602)  Progress: improving  Plan of Care Reviewed With: patient  Outcome Evaluation: Patient wore bipap part of the night.  New IID 20 to RFA.  Lovenox given.  Lung sounds diminished.  VSS.  No complaints.  Cont., to monitor.  Up with assist x1 and a walker.   Call for concerns.   Goal Outcome Evaluation:  Plan of Care Reviewed With: patient        Progress: improving  Outcome Evaluation: Patient wore bipap part of the night.  New IID 20 to RFA.  Lovenox given.  Lung sounds diminished.  VSS.  No complaints.  Cont., to monitor.  Up with assist x1 and a walker.   Call for concerns.

## 2022-08-11 PROBLEM — I51.81 TAKOTSUBO SYNDROME: Status: ACTIVE | Noted: 2022-08-11

## 2022-08-11 PROBLEM — E78.2 MIXED HYPERLIPIDEMIA: Status: ACTIVE | Noted: 2022-08-11

## 2022-08-11 LAB
ANION GAP SERPL CALCULATED.3IONS-SCNC: 5 MMOL/L (ref 5–15)
BASOPHILS # BLD AUTO: 0.01 10*3/MM3 (ref 0–0.2)
BASOPHILS NFR BLD AUTO: 0.1 % (ref 0–1.5)
BUN SERPL-MCNC: 14 MG/DL (ref 8–23)
BUN/CREAT SERPL: 36.8 (ref 7–25)
CALCIUM SPEC-SCNC: 8.6 MG/DL (ref 8.6–10.5)
CHLORIDE SERPL-SCNC: 103 MMOL/L (ref 98–107)
CO2 SERPL-SCNC: 32 MMOL/L (ref 22–29)
CREAT SERPL-MCNC: 0.38 MG/DL (ref 0.57–1)
DEPRECATED RDW RBC AUTO: 47 FL (ref 37–54)
EGFRCR SERPLBLD CKD-EPI 2021: 108.6 ML/MIN/1.73
EOSINOPHIL # BLD AUTO: 0 10*3/MM3 (ref 0–0.4)
EOSINOPHIL NFR BLD AUTO: 0 % (ref 0.3–6.2)
ERYTHROCYTE [DISTWIDTH] IN BLOOD BY AUTOMATED COUNT: 13.2 % (ref 12.3–15.4)
GLUCOSE BLDC GLUCOMTR-MCNC: 133 MG/DL (ref 70–130)
GLUCOSE BLDC GLUCOMTR-MCNC: 143 MG/DL (ref 70–130)
GLUCOSE BLDC GLUCOMTR-MCNC: 167 MG/DL (ref 70–130)
GLUCOSE SERPL-MCNC: 165 MG/DL (ref 65–99)
HCT VFR BLD AUTO: 36 % (ref 34–46.6)
HGB BLD-MCNC: 11.9 G/DL (ref 12–15.9)
IMM GRANULOCYTES # BLD AUTO: 0.03 10*3/MM3 (ref 0–0.05)
IMM GRANULOCYTES NFR BLD AUTO: 0.4 % (ref 0–0.5)
LYMPHOCYTES # BLD AUTO: 0.45 10*3/MM3 (ref 0.7–3.1)
LYMPHOCYTES NFR BLD AUTO: 6.5 % (ref 19.6–45.3)
MCH RBC QN AUTO: 32.2 PG (ref 26.6–33)
MCHC RBC AUTO-ENTMCNC: 33.1 G/DL (ref 31.5–35.7)
MCV RBC AUTO: 97.6 FL (ref 79–97)
MONOCYTES # BLD AUTO: 0.25 10*3/MM3 (ref 0.1–0.9)
MONOCYTES NFR BLD AUTO: 3.6 % (ref 5–12)
NEUTROPHILS NFR BLD AUTO: 6.22 10*3/MM3 (ref 1.7–7)
NEUTROPHILS NFR BLD AUTO: 89.4 % (ref 42.7–76)
NRBC BLD AUTO-RTO: 0 /100 WBC (ref 0–0.2)
PLATELET # BLD AUTO: 258 10*3/MM3 (ref 140–450)
PMV BLD AUTO: 10.6 FL (ref 6–12)
POTASSIUM SERPL-SCNC: 3.7 MMOL/L (ref 3.5–5.2)
QT INTERVAL: 296 MS
QT INTERVAL: 358 MS
QT INTERVAL: 478 MS
QTC INTERVAL: 440 MS
QTC INTERVAL: 501 MS
QTC INTERVAL: 578 MS
RBC # BLD AUTO: 3.69 10*6/MM3 (ref 3.77–5.28)
SODIUM SERPL-SCNC: 140 MMOL/L (ref 136–145)
WBC NRBC COR # BLD: 6.96 10*3/MM3 (ref 3.4–10.8)

## 2022-08-11 PROCEDURE — 25010000002 METHYLPREDNISOLONE PER 125 MG: Performed by: INTERNAL MEDICINE

## 2022-08-11 PROCEDURE — 94660 CPAP INITIATION&MGMT: CPT

## 2022-08-11 PROCEDURE — 94799 UNLISTED PULMONARY SVC/PX: CPT

## 2022-08-11 PROCEDURE — 94618 PULMONARY STRESS TESTING: CPT

## 2022-08-11 PROCEDURE — 94761 N-INVAS EAR/PLS OXIMETRY MLT: CPT

## 2022-08-11 PROCEDURE — 25010000002 ENOXAPARIN PER 10 MG

## 2022-08-11 PROCEDURE — 94664 DEMO&/EVAL PT USE INHALER: CPT

## 2022-08-11 PROCEDURE — 25010000002 METHYLPREDNISOLONE PER 40 MG: Performed by: NURSE PRACTITIONER

## 2022-08-11 PROCEDURE — 99232 SBSQ HOSP IP/OBS MODERATE 35: CPT | Performed by: INTERNAL MEDICINE

## 2022-08-11 PROCEDURE — 80048 BASIC METABOLIC PNL TOTAL CA: CPT | Performed by: INTERNAL MEDICINE

## 2022-08-11 PROCEDURE — 85025 COMPLETE CBC W/AUTO DIFF WBC: CPT | Performed by: INTERNAL MEDICINE

## 2022-08-11 PROCEDURE — 82962 GLUCOSE BLOOD TEST: CPT

## 2022-08-11 RX ORDER — LISINOPRIL 2.5 MG/1
2.5 TABLET ORAL DAILY
Qty: 30 TABLET | Refills: 11 | Status: SHIPPED | OUTPATIENT
Start: 2022-08-11 | End: 2022-09-23

## 2022-08-11 RX ORDER — METOPROLOL SUCCINATE 25 MG/1
12.5 TABLET, EXTENDED RELEASE ORAL
Status: DISCONTINUED | OUTPATIENT
Start: 2022-08-12 | End: 2022-08-12 | Stop reason: HOSPADM

## 2022-08-11 RX ORDER — METOPROLOL SUCCINATE 25 MG/1
12.5 TABLET, EXTENDED RELEASE ORAL DAILY
Qty: 45 TABLET | Refills: 3 | Status: SHIPPED | OUTPATIENT
Start: 2022-08-11 | End: 2022-09-23

## 2022-08-11 RX ORDER — METHYLPREDNISOLONE SODIUM SUCCINATE 40 MG/ML
40 INJECTION, POWDER, LYOPHILIZED, FOR SOLUTION INTRAMUSCULAR; INTRAVENOUS EVERY 12 HOURS
Status: DISCONTINUED | OUTPATIENT
Start: 2022-08-11 | End: 2022-08-12 | Stop reason: HOSPADM

## 2022-08-11 RX ORDER — LISINOPRIL 2.5 MG/1
2.5 TABLET ORAL
Status: DISCONTINUED | OUTPATIENT
Start: 2022-08-12 | End: 2022-08-12 | Stop reason: HOSPADM

## 2022-08-11 RX ADMIN — SERTRALINE HYDROCHLORIDE 25 MG: 50 TABLET, FILM COATED ORAL at 08:26

## 2022-08-11 RX ADMIN — ALBUTEROL SULFATE 1.25 MG: 2.5 SOLUTION RESPIRATORY (INHALATION) at 19:52

## 2022-08-11 RX ADMIN — IPRATROPIUM BROMIDE 0.5 MG: 0.5 SOLUTION RESPIRATORY (INHALATION) at 19:52

## 2022-08-11 RX ADMIN — ALBUTEROL SULFATE 1.25 MG: 2.5 SOLUTION RESPIRATORY (INHALATION) at 10:22

## 2022-08-11 RX ADMIN — ALBUTEROL SULFATE 1.25 MG: 2.5 SOLUTION RESPIRATORY (INHALATION) at 07:27

## 2022-08-11 RX ADMIN — METHYLPREDNISOLONE SODIUM SUCCINATE 40 MG: 40 INJECTION, POWDER, FOR SOLUTION INTRAMUSCULAR; INTRAVENOUS at 18:50

## 2022-08-11 RX ADMIN — CETIRIZINE HYDROCHLORIDE 10 MG: 10 TABLET ORAL at 08:26

## 2022-08-11 RX ADMIN — ALBUTEROL SULFATE 1.25 MG: 2.5 SOLUTION RESPIRATORY (INHALATION) at 14:52

## 2022-08-11 RX ADMIN — METHYLPREDNISOLONE SODIUM SUCCINATE 60 MG: 125 INJECTION, POWDER, FOR SOLUTION INTRAMUSCULAR; INTRAVENOUS at 06:00

## 2022-08-11 RX ADMIN — ENOXAPARIN SODIUM 30 MG: 100 INJECTION SUBCUTANEOUS at 20:35

## 2022-08-11 RX ADMIN — Medication 10 ML: at 20:35

## 2022-08-11 RX ADMIN — BUDESONIDE AND FORMOTEROL FUMARATE DIHYDRATE 2 PUFF: 160; 4.5 AEROSOL RESPIRATORY (INHALATION) at 19:52

## 2022-08-11 RX ADMIN — ACETAMINOPHEN 650 MG: 325 TABLET, FILM COATED ORAL at 08:26

## 2022-08-11 RX ADMIN — METOPROLOL TARTRATE 12.5 MG: 25 TABLET, FILM COATED ORAL at 08:26

## 2022-08-11 RX ADMIN — ATORVASTATIN CALCIUM 40 MG: 40 TABLET, FILM COATED ORAL at 20:35

## 2022-08-11 RX ADMIN — IPRATROPIUM BROMIDE 0.5 MG: 0.5 SOLUTION RESPIRATORY (INHALATION) at 07:27

## 2022-08-11 RX ADMIN — GUAIFENESIN 600 MG: 600 TABLET, EXTENDED RELEASE ORAL at 20:35

## 2022-08-11 RX ADMIN — BUDESONIDE AND FORMOTEROL FUMARATE DIHYDRATE 2 PUFF: 160; 4.5 AEROSOL RESPIRATORY (INHALATION) at 07:27

## 2022-08-11 RX ADMIN — IPRATROPIUM BROMIDE 0.5 MG: 0.5 SOLUTION RESPIRATORY (INHALATION) at 14:52

## 2022-08-11 RX ADMIN — ASPIRIN 81 MG: 81 TABLET, COATED ORAL at 08:26

## 2022-08-11 RX ADMIN — GUAIFENESIN 600 MG: 600 TABLET, EXTENDED RELEASE ORAL at 08:26

## 2022-08-11 RX ADMIN — IPRATROPIUM BROMIDE 0.5 MG: 0.5 SOLUTION RESPIRATORY (INHALATION) at 10:22

## 2022-08-11 RX ADMIN — ACETAMINOPHEN 650 MG: 325 TABLET, FILM COATED ORAL at 02:16

## 2022-08-11 NOTE — PROGRESS NOTES
[facility] Medicine Services  INPATIENT PROGRESS NOTE    Length of Stay: 2  Date of Admission: 8/9/2022  Primary Care Physician: Sonu Lennon MD    Subjective   Chief Complaint: Follow-up shortness of breath  HPI   Sitting up in bed.  Oxygen at 1 L with saturation 95%.  Patient tells me she wears oxygen at 4 L at home.  However, CO2 67 on 4 L in ER.  She required BiPAP.  Patient denies any complaints of chest pain.  Cardiac catheterization yesterday per Dr. Carroll noted normal coronary arteries and akinesis consistent with Takotsubo.  Metoprolol switched to XL today.  Add low-dose ACE.  No need for aspirin per cardiology.  Discussed with JOHANNE Fontanez today.  Patient feels some better today but does not feel she is at baseline well enough to be discharged today.  Have encouraged patient be out of bed and ambulate and reassess oxygen need.  She denies sputum production.  Minimal wheezing posterior.  Her daughter Dex is respiratory therapist at our facility.    Review of Systems   Constitutional: Positive for activity change. Negative for chills and fever.   HENT: Negative for congestion and trouble swallowing.    Eyes: Negative for photophobia and visual disturbance.   Respiratory: Positive for shortness of breath and wheezing. Negative for cough.    Cardiovascular: Negative for chest pain and leg swelling.   Gastrointestinal: Negative for constipation, diarrhea, nausea and vomiting.   Endocrine: Negative for cold intolerance, heat intolerance and polyuria.   Genitourinary: Negative for dysuria, frequency and urgency.   Musculoskeletal: Negative for gait problem.   Skin: Negative for color change, pallor, rash and wound.   Allergic/Immunologic: Negative for immunocompromised state.   Neurological: Positive for weakness. Negative for light-headedness.   Hematological: Negative for adenopathy. Does not bruise/bleed easily.   Psychiatric/Behavioral: Negative for agitation,  behavioral problems and confusion.      All pertinent negatives and positives are as above. All other systems have been reviewed and are negative unless otherwise stated.     Objective    Temp:  [97.2 °F (36.2 °C)-98.2 °F (36.8 °C)] 98 °F (36.7 °C)  Heart Rate:  [] 84  Resp:  [16-20] 16  BP: ()/(51-89) 117/64  Physical Exam  Vitals and nursing note reviewed.   Constitutional:       Appearance: Normal appearance.      Comments: Sitting up in bed.  Oxygen at 1 L.  No family in room.   HENT:      Head: Normocephalic and atraumatic.      Nose: No congestion.      Mouth/Throat:      Pharynx: Oropharynx is clear. No oropharyngeal exudate or posterior oropharyngeal erythema.   Eyes:      Extraocular Movements: Extraocular movements intact.      Pupils: Pupils are equal, round, and reactive to light.   Cardiovascular:      Rate and Rhythm: Normal rate and regular rhythm.      Heart sounds: No murmur heard.     Comments: Normal sinus rhythm 69 on telemetry.  Pulmonary:      Breath sounds: Wheezing (Faint expiratory wheeze posterior.) present.      Comments: Oxygen at 1 L.  Saturation 95%.  Abdominal:      Palpations: Abdomen is soft.      Tenderness: There is no abdominal tenderness.   Genitourinary:     Comments: Voiding.  Musculoskeletal:         General: No swelling or tenderness.      Cervical back: Normal range of motion and neck supple.   Skin:     General: Skin is warm and dry.   Neurological:      General: No focal deficit present.      Mental Status: She is alert and oriented to person, place, and time.   Psychiatric:         Mood and Affect: Mood normal.         Behavior: Behavior normal.         Thought Content: Thought content normal.         Judgment: Judgment normal.       Results Review:  I have reviewed the labs, radiology results, and diagnostic studies.    Laboratory Data:      Results from last 7 days   Lab Units 08/11/22  0321 08/10/22  0531 08/09/22  1221   WBC 10*3/mm3 6.96 5.38 11.08*    HEMOGLOBIN g/dL 11.9* 12.9 13.4   HEMATOCRIT % 36.0 38.3 42.1   PLATELETS 10*3/mm3 258 259 265        Results from last 7 days   Lab Units 08/11/22  0321 08/10/22  0531 08/09/22  1221   SODIUM mmol/L 140 138 141   POTASSIUM mmol/L 3.7 3.2* 3.7   CHLORIDE mmol/L 103 96* 97*   CO2 mmol/L 32.0* 31.0* 36.0*   BUN mg/dL 14 9 11   CREATININE mg/dL 0.38* 0.25* 0.37*   GLUCOSE mg/dL 165* 141* 175*   CALCIUM mg/dL 8.6 9.0 9.3   ALT (SGPT) U/L  --   --  23     Culture Data:      Blood Culture   Date Value Ref Range Status   08/09/2022 No growth at 24 hours  Preliminary   08/09/2022 No growth at 24 hours  Preliminary     Radiology Data:   Imaging Results (Last 7 Days)     Procedure Component Value Units Date/Time    XR Chest 1 View [[id number]] Collected: 08/09/22 1416     Updated: 08/09/22 1420    Narrative:      EXAMINATION: XR CHEST 1 VW- 8/9/2022 2:16 PM CDT     HISTORY: shortness of breath.     REPORT: A frontal view of the chest was obtained.     COMPARISON: Chest x-ray 3/1/2019.     The lungs are hyperinflated, there are coarse interstitial markings as  before. This is compatible with COPD. No infiltrate or consolidation is  identified. Heart size is upper limits of normal. No evidence of CHF is  identified. There is no pneumothorax or pleural effusion. Surgical clips  are noted in the axilla bilaterally.       Impression:      Stable chronic lung changes and evidence of COPD with  interstitial fibrosis. No superimposed acute infiltrates are identified.  This report was finalized on 08/09/2022 14:17 by Dr. Driss Lopez MD.        Cardiac catheterization 8/10/2022    Conclusion:  Normal coronary arteries  LVEF 40% with anteroapical dyskinesis consistent with Takotsubo's myocarditis       Results for orders placed during the hospital encounter of 08/09/22    Adult Transthoracic Echo Complete W/ Cont if Necessary Per Protocol    Interpretation Summary  · Calculated left ventricular 3D EF = 55% Estimated left  "ventricular EF = 45% Left ventricular systolic function is low normal.  · Left ventricular diastolic dysfunction is noted.  · The following left ventricular wall segments are hypokinetic: apical anterior, apical lateral, apical inferior, apical septal and apex hypokinetic.      Intake/Output    Intake/Output Summary (Last 24 hours) at 8/11/2022 1126  Last data filed at 8/11/2022 0951  Gross per 24 hour   Intake 1860 ml   Output 1750 ml   Net 110 ml       Scheduled Meds  albuterol, 1.25 mg, Nebulization, 4x Daily - RT   And  ipratropium, 0.5 mg, Nebulization, 4x Daily - RT  atorvastatin, 40 mg, Oral, Nightly  budesonide-formoterol, 2 puff, Inhalation, BID - RT  cetirizine, 10 mg, Oral, Daily  enoxaparin, 30 mg, Subcutaneous, Q24H  guaiFENesin, 600 mg, Oral, Q12H  insulin lispro, 2-7 Units, Subcutaneous, TID AC  methylPREDNISolone sodium succinate, 40 mg, Intravenous, Q12H  [START ON 8/12/2022] metoprolol succinate XL, 12.5 mg, Oral, Q24H  sertraline, 25 mg, Oral, Daily  sodium chloride, 10 mL, Intravenous, Q12H      I have reviewed the patient current medications.     Assessment/Plan     Active Hospital Problems    Diagnosis    • **Acute respiratory failure with hypercapnia (HCC)    • Takotsubo syndrome    • Mixed hyperlipidemia    • COPD exacerbation (HCC)    • Hyperglycemia    • Essential hypertension    • Elevated troponin due to Takotsubo myocarditis and COPD exacerbation    • Pulmonary fibrosis (HCC)    • Chronic respiratory failure with hypoxia (HCC)      Plan:  1.  To ER 8/9 with shortness of breath and chest pain.  She describes \"tightness\".  She wears oxygen 4 L continuously.  She used albuterol inhaler without improvement of symptoms.  She was diagnosed with COVID-19 on July 19th but symptoms resolved.  She awoke 8/9 with pressure associated with weakness she described different from COPD exacerbations.  She was hypertensive and hypercapnic in ER.  Initial troponin 0.124.  EKG on arrival not concerning for " acute changes. PCO2 67 and required BiPAP in ER.  DuoNeb, Solu-Medrol, magnesium ER.    2.  Elevated troponin secondary to Takotsubo myocarditis and COPD exacerbation.  Cardiology consulted.  Echocardiogram showed ejection fraction 45% with left ventricular diastolic dysfunction.  Multiple left ventricular wall segments were hypokinetic including apical anterior, apical lateral, apical inferior, apical septal.  Repeat EKG showed T wave inversions lead II, V4 and VF.  Troponin 0.01, 0.124, 0.073.  Cardiac catheterization by Dr. Carroll 8/10/22 showed normal coronary arteries with LVEF 40% with anteroapical dyskinesis consistent with Takotsubo myocarditis.  Continue beta-blocker and transitioned to Toprol XL.  We will add low-dose ACE inhibitor.  Per JOHANNE Fontanez no need for aspirin as coronaries normal.    3.  Acute respiratory failure with hypercapnia.  Normally wears oxygen at 4 L at home.  PCO2 67 in ER and required BiPAP.  She was transitioned back to cannula.  She has been weaned to cannula at 1 L.  Will perform walking oximetry prior to discharge.    4.  COPD exacerbation.  Wears oxygen at 4 L at home.  Weaned to 1 L.  Decrease Solu-Medrol to 40 mg IV every 12 hours.  Plan to transition to tapering dose of prednisone at discharge.  Continue Symbicort, Mucinex and half-strength DuoNebs.  Blood cultures no growth at 24 hours.    5.  Mixed hyperlipidemia.  Cholesterol 230, , .  Atorvastatin 40 mg nightly started.    6.  Blood pressure elevated on admission improved after metoprolol.  Metoprolol transition to metoprolol XL 12.5 mg daily.  Blood pressure 117/64.    7.  Elevated glucoses secondary to steroids.    8.  Continue Lovenox for DVT prophylaxis.  Reviewed home medications and restarted if appropriate.    CODE STATUS/advance care planning: Full code  Patient surrogate decision maker is her .    The above documentation resulted from a face-to-face encounter by alexandre WHITING,  ACNP-BC.    Discharge Planning: I expect patient to be discharged to home in 1-2 days.    Electronically signed by JOHANNE Acharya, 8/11/2022, 11:26 CDT.

## 2022-08-11 NOTE — PROGRESS NOTES
[facility] -  Progress Note     LOS: 2 days   Patient Care Team:  Sonu Lennon MD as PCP - General  Sonu Lennon MD as PCP - Family Medicine  Ar Kendrick MD as Consulting Physician (Pulmonary Disease)  [facility]    Chief Complaint: elevated troponin follow up    Subjective     Interval History:   Underwent LHC yesterday with angiography revealing normal coronary arteries with presence of Takostubo cardiomyopathy with LVEF 40%. She notes her breathing is back to baseline. She is anticipating discharge tomorrow.         Review of Systems:   Review of Systems   Constitutional: Negative for diaphoresis, fatigue and unexpected weight change.   Respiratory: Negative for choking, chest tightness, shortness of breath and wheezing.    Cardiovascular: Negative for chest pain, palpitations and leg swelling.   Gastrointestinal: Negative for diarrhea, nausea and vomiting.   Neurological: Negative for dizziness, syncope and light-headedness.   All other systems reviewed and are negative.      Objective     Vital Sign Min/Max for last 24 hours  Temp  Min: 97.2 °F (36.2 °C)  Max: 98.2 °F (36.8 °C)   BP  Min: 108/51  Max: 136/89   Pulse  Min: 74  Max: 112   Resp  Min: 16  Max: 18   SpO2  Min: 93 %  Max: 99 %   Flow (L/min)  Min: 1  Max: 1   Weight  Min: 75.6 kg (166 lb 9 oz)  Max: 75.6 kg (166 lb 9 oz)         08/10/22  2007   Weight: 75.6 kg (166 lb 9 oz)         Intake/Output Summary (Last 24 hours) at 8/11/2022 1610  Last data filed at 8/11/2022 1055  Gross per 24 hour   Intake 1860 ml   Output 2050 ml   Net -190 ml         Physical Exam:  Vitals reviewed.   Constitutional:       General: Not in acute distress.     Appearance: Healthy appearance. Well-developed. Not diaphoretic.   Eyes:      General: No scleral icterus.     Conjunctiva/sclera: Conjunctivae normal.      Pupils: Pupils are equal, round, and reactive to light.   HENT:      Head: Normocephalic.    Mouth/Throat:      Pharynx: No  oropharyngeal exudate.   Neck:      Vascular: No JVR.   Pulmonary:      Effort: Pulmonary effort is normal. No respiratory distress.      Breath sounds: Normal breath sounds. No wheezing. No rhonchi. No rales.   Chest:      Chest wall: Not tender to palpatation.   Cardiovascular:      Normal rate. Regular rhythm.   Pulses:     Intact distal pulses.   Edema:     Peripheral edema absent.   Abdominal:      General: Bowel sounds are normal. There is no distension.      Palpations: Abdomen is soft.      Tenderness: There is no abdominal tenderness.   Musculoskeletal: Normal range of motion.      Cervical back: Normal range of motion and neck supple. Skin:     General: Skin is warm and dry.      Coloration: Skin is not pale.      Findings: No erythema or rash.   Neurological:      Mental Status: Alert, oriented to person, place, and time and oriented to person, place and time.      Deep Tendon Reflexes: Reflexes are normal and symmetric.   Psychiatric:         Behavior: Behavior normal.          Results Review:   Lab Results (last 72 hours)     Procedure Component Value Units Date/Time    Blood Culture - Blood, Hand, Right [[id number]]  (Normal) Collected: 08/09/22 1406    Specimen: Blood from Hand, Right Updated: 08/11/22 1432     Blood Culture No growth at 2 days    Blood Culture - Blood, Arm, Right [[id number]]  (Normal) Collected: 08/09/22 1221    Specimen: Blood from Arm, Right Updated: 08/11/22 1246     Blood Culture No growth at 2 days    Basic Metabolic Panel [[id number]]  (Abnormal) Collected: 08/11/22 0321    Specimen: Blood Updated: 08/11/22 0404     Glucose 165 mg/dL      BUN 14 mg/dL      Creatinine 0.38 mg/dL      Sodium 140 mmol/L      Potassium 3.7 mmol/L      Chloride 103 mmol/L      CO2 32.0 mmol/L      Calcium 8.6 mg/dL      BUN/Creatinine Ratio 36.8     Anion Gap 5.0 mmol/L      eGFR 108.6 mL/min/1.73      Comment: National Kidney Foundation and American Society of Nephrology (ASN) Task Force recommended  calculation based on the Chronic Kidney Disease Epidemiology Collaboration (CKD-EPI) equation refit without adjustment for race.       Narrative:      GFR Normal >60  Chronic Kidney Disease <60  Kidney Failure <15      CBC & Differential [[id number]]  (Abnormal) Collected: 08/11/22 0321    Specimen: Blood Updated: 08/11/22 0340    Narrative:      The following orders were created for panel order CBC & Differential.  Procedure                               Abnormality         Status                     ---------                               -----------         ------                     CBC Auto Differential[[id number]]        Abnormal            Final result                 Please view results for these tests on the individual orders.    CBC Auto Differential [[id number]]  (Abnormal) Collected: 08/11/22 0321    Specimen: Blood Updated: 08/11/22 0340     WBC 6.96 10*3/mm3      RBC 3.69 10*6/mm3      Hemoglobin 11.9 g/dL      Hematocrit 36.0 %      MCV 97.6 fL      MCH 32.2 pg      MCHC 33.1 g/dL      RDW 13.2 %      RDW-SD 47.0 fl      MPV 10.6 fL      Platelets 258 10*3/mm3      Neutrophil % 89.4 %      Lymphocyte % 6.5 %      Monocyte % 3.6 %      Eosinophil % 0.0 %      Basophil % 0.1 %      Immature Grans % 0.4 %      Neutrophils, Absolute 6.22 10*3/mm3      Lymphocytes, Absolute 0.45 10*3/mm3      Monocytes, Absolute 0.25 10*3/mm3      Eosinophils, Absolute 0.00 10*3/mm3      Basophils, Absolute 0.01 10*3/mm3      Immature Grans, Absolute 0.03 10*3/mm3      nRBC 0.0 /100 WBC     POC Glucose Once [[id number]]  (Abnormal) Collected: 08/10/22 1643    Specimen: Blood Updated: 08/10/22 1656     Glucose 138 mg/dL      Comment: : [id number] Chung ShaunaMeter ID: [id number]       POC Glucose Once [[id number]]  (Abnormal) Collected: 08/10/22 1032    Specimen: Blood Updated: 08/10/22 1053     Glucose 178 mg/dL      Comment: : [id number] Denilson LadonnaMeter ID: [id number]       Troponin [[id number]]  (Abnormal)  Collected: 08/10/22 0531    Specimen: Blood Updated: 08/10/22 0639     Troponin T 0.073 ng/mL     Narrative:      Troponin T Reference Range:  <= 0.03 ng/mL-   Negative for AMI  >0.03 ng/mL-     Abnormal for myocardial necrosis.  Clinicians would have to utilize clinical acumen, EKG, Troponin and serial changes to determine if it is an Acute Myocardial Infarction or myocardial injury due to an underlying chronic condition.       Results may be falsely decreased if patient taking Biotin.      Basic Metabolic Panel [[id number]]  (Abnormal) Collected: 08/10/22 0531    Specimen: Blood Updated: 08/10/22 0636     Glucose 141 mg/dL      BUN 9 mg/dL      Creatinine 0.25 mg/dL      Sodium 138 mmol/L      Potassium 3.2 mmol/L      Chloride 96 mmol/L      CO2 31.0 mmol/L      Calcium 9.0 mg/dL      BUN/Creatinine Ratio 36.0     Anion Gap 11.0 mmol/L      eGFR 120.2 mL/min/1.73      Comment: National Kidney Foundation and American Society of Nephrology (ASN) Task Force recommended calculation based on the Chronic Kidney Disease Epidemiology Collaboration (CKD-EPI) equation refit without adjustment for race.       Narrative:      GFR Normal >60  Chronic Kidney Disease <60  Kidney Failure <15      Lipid Panel [[id number]]  (Abnormal) Collected: 08/10/22 0531    Specimen: Blood Updated: 08/10/22 0636     Total Cholesterol 230 mg/dL      Triglycerides 52 mg/dL      HDL Cholesterol 107 mg/dL      LDL Cholesterol  114 mg/dL      VLDL Cholesterol 9 mg/dL      LDL/HDL Ratio 1.05    Narrative:      Cholesterol Reference Ranges  (U.S. Department of Health and Human Services ATP III Classifications)    Desirable          <200 mg/dL  Borderline High    200-239 mg/dL  High Risk          >240 mg/dL      Triglyceride Reference Ranges  (U.S. Department of Health and Human Services ATP III Classifications)    Normal           <150 mg/dL  Borderline High  150-199 mg/dL  High             200-499 mg/dL  Very High        >500 mg/dL    HDL Reference  Ranges  (U.S. Department of Health and Human Services ATP III Classifications)    Low     <40 mg/dl (major risk factor for CHD)  High    >60 mg/dl ('negative' risk factor for CHD)        LDL Reference Ranges  (U.S. Department of Health and Human Services ATP III Classifications)    Optimal          <100 mg/dL  Near Optimal     100-129 mg/dL  Borderline High  130-159 mg/dL  High             160-189 mg/dL  Very High        >189 mg/dL    Hemoglobin A1c [[id number]]  (Normal) Collected: 08/10/22 0531    Specimen: Blood Updated: 08/10/22 0622     Hemoglobin A1C 5.00 %     Narrative:      Hemoglobin A1C Ranges:    Increased Risk for Diabetes  5.7% to 6.4%  Diabetes                     >= 6.5%  Diabetic Goal                < 7.0%    CBC & Differential [[id number]]  (Abnormal) Collected: 08/10/22 0531    Specimen: Blood Updated: 08/10/22 0617    Narrative:      The following orders were created for panel order CBC & Differential.  Procedure                               Abnormality         Status                     ---------                               -----------         ------                     CBC Auto Differential[[id number]]        Abnormal            Final result                 Please view results for these tests on the individual orders.    CBC Auto Differential [[id number]]  (Abnormal) Collected: 08/10/22 0531    Specimen: Blood Updated: 08/10/22 0617     WBC 5.38 10*3/mm3      RBC 3.96 10*6/mm3      Hemoglobin 12.9 g/dL      Hematocrit 38.3 %      MCV 96.7 fL      MCH 32.6 pg      MCHC 33.7 g/dL      RDW 13.2 %      RDW-SD 47.0 fl      MPV 10.4 fL      Platelets 259 10*3/mm3      Neutrophil % 86.2 %      Lymphocyte % 11.2 %      Monocyte % 1.5 %      Eosinophil % 0.0 %      Basophil % 0.2 %      Immature Grans % 0.9 %      Neutrophils, Absolute 4.64 10*3/mm3      Lymphocytes, Absolute 0.60 10*3/mm3      Monocytes, Absolute 0.08 10*3/mm3      Eosinophils, Absolute 0.00 10*3/mm3      Basophils, Absolute 0.01  10*3/mm3      Immature Grans, Absolute 0.05 10*3/mm3      nRBC 0.0 /100 WBC     Troponin [[id number]]  (Abnormal) Collected: 08/09/22 1740    Specimen: Blood Updated: 08/09/22 1812     Troponin T 0.124 ng/mL     Narrative:      Troponin T Reference Range:  <= 0.03 ng/mL-   Negative for AMI  >0.03 ng/mL-     Abnormal for myocardial necrosis.  Clinicians would have to utilize clinical acumen, EKG, Troponin and serial changes to determine if it is an Acute Myocardial Infarction or myocardial injury due to an underlying chronic condition.       Results may be falsely decreased if patient taking Biotin.      POC Glucose Once [[id number]]  (Abnormal) Collected: 08/09/22 1800    Specimen: Blood Updated: 08/09/22 1812     Glucose 145 mg/dL      Comment: : [id number] Wilkes ShaunaMeter ID: [id number]       [place] Draw [[id number]] Collected: 08/09/22 1221    Specimen: Blood Updated: 08/09/22 1633    Narrative:      The following orders were created for panel order [place] Draw.  Procedure                               Abnormality         Status                     ---------                               -----------         ------                     Green Top (Gel)[[id number]]                                  Final result               Lavender Top[[id number]]                                     Final result               Red Top[[id number]]                                          Final result               [place] Blood Culture Mata...[[id number]]                      Final result               Naidu Top[[id number]]                                         Final result               Light Blue Top[[id number]]                                   Final result                 Please view results for these tests on the individual orders.    Naidu Top [[id number]] Collected: 08/09/22 1221    Specimen: Blood Updated: 08/09/22 1633     Extra Tube Hold for add-ons.     Comment: Auto resulted.       COVID-19,[facility] IN-HOUSE,Nasal Swab No  [employer] 3-4 HR TAT - Swab, Nasal Cavity [[id number]]  (Normal) Collected: 08/09/22 1305    Specimen: Swab from Nasal Cavity Updated: 08/09/22 1410     COVID19 Not Detected    Narrative:      Fact sheet for providers: https://www.fda.gov/media/821684/download     Fact sheet for patients: https://www.fda.gov/media/487961/download    Test performed by PCR.    Consider negative results in combination with clinical observations, patient history, and epidemiological information.    Blood Gas, Arterial - [[id number]]  (Abnormal) Collected: 08/09/22 1409    Specimen: Arterial Blood Updated: 08/09/22 1409     Site Right Radial     Ming's Test Negative     pH, Arterial 7.440 pH units      pCO2, Arterial 51.8 mm Hg      Comment: 83 Value above reference range        pO2, Arterial 108.0 mm Hg      Comment: 83 Value above reference range        HCO3, Arterial 35.2 mmol/L      Comment: 83 Value above reference range        Base Excess, Arterial 9.3 mmol/L      Comment: 83 Value above reference range        O2 Saturation, Arterial 98.2 %      Temperature 37.0 C      Barometric Pressure for Blood Gas 751 mmHg      Modality BiPap     FIO2 30 %      Ventilator Mode BiPAP     Set Mech Resp Rate 16.0     IPAP 14     Comment: Meter: [id number]     :  [id number]        EPAP 6     Collected by [id number]     pCO2, Temperature Corrected 51.8 mm Hg      pH, Temp Corrected 7.440 pH Units      pO2, Temperature Corrected 108 mm Hg     [facility] Blood Culture Bottle Set [[id number]] Collected: 08/09/22 1221    Specimen: Blood from Arm, Right Updated: 08/09/22 1333     Extra Tube Hold for add-ons.     Comment: Auto resulted.       Lavender Top [[id number]] Collected: 08/09/22 1221    Specimen: Blood Updated: 08/09/22 1333     Extra Tube hold for add-on     Comment: Auto resulted       Green Top (Gel) [[id number]] Collected: 08/09/22 1221    Specimen: Blood Updated: 08/09/22 1333     Extra Tube Hold for add-ons.     Comment: Auto  resulted.       Red Top [[id number]] Collected: 08/09/22 1221    Specimen: Blood Updated: 08/09/22 1333     Extra Tube Hold for add-ons.     Comment: Auto resulted.       Light Blue Top [[id number]] Collected: 08/09/22 1221    Specimen: Blood Updated: 08/09/22 1333     Extra Tube Hold for add-ons.     Comment: Auto resulted       Comprehensive Metabolic Panel [[id number]]  (Abnormal) Collected: 08/09/22 1221    Specimen: Blood Updated: 08/09/22 1303     Glucose 175 mg/dL      BUN 11 mg/dL      Creatinine 0.37 mg/dL      Sodium 141 mmol/L      Potassium 3.7 mmol/L      Chloride 97 mmol/L      CO2 36.0 mmol/L      Calcium 9.3 mg/dL      Total Protein 7.6 g/dL      Albumin 4.80 g/dL      ALT (SGPT) 23 U/L      AST (SGOT) 27 U/L      Alkaline Phosphatase 60 U/L      Total Bilirubin 0.5 mg/dL      Globulin 2.8 gm/dL      A/G Ratio 1.7 g/dL      BUN/Creatinine Ratio 29.7     Anion Gap 8.0 mmol/L      eGFR 109.3 mL/min/1.73      Comment: National Kidney Foundation and American Society of Nephrology (ASN) Task Force recommended calculation based on the Chronic Kidney Disease Epidemiology Collaboration (CKD-EPI) equation refit without adjustment for race.       Narrative:      GFR Normal >60  Chronic Kidney Disease <60  Kidney Failure <15      Procalcitonin [[id number]]  (Normal) Collected: 08/09/22 1221    Specimen: Blood Updated: 08/09/22 1301     Procalcitonin 0.04 ng/mL     Narrative:      As a Marker for Sepsis (Non-Neonates):    1. <0.5 ng/mL represents a low risk of severe sepsis and/or septic shock.  2. >2 ng/mL represents a high risk of severe sepsis and/or septic shock.    As a Marker for Lower Respiratory Tract Infections that require antibiotic therapy:    PCT on Admission    Antibiotic Therapy       6-12 Hrs later    >0.5                Strongly Recommended  >0.25 - <0.5        Recommended   0.1 - 0.25          Discouraged              Remeasure/reassess PCT  <0.1                Strongly Discouraged      "Remeasure/reassess PCT    As 28 day mortality risk marker: \"Change in Procalcitonin Result\" (>80% or <=80%) if Day 0 (or Day 1) and Day 4 values are available. Refer to http://www.[facility]-pct-calculator.com    Change in PCT <=80%  A decrease of PCT levels below or equal to 80% defines a positive change in PCT test result representing a higher risk for 28-day all-cause mortality of patients diagnosed with severe sepsis for septic shock.    Change in PCT >80%  A decrease of PCT levels of more than 80% defines a negative change in PCT result representing a lower risk for 28-day all-cause mortality of patients diagnosed with severe sepsis or septic shock.       Magnesium [[id number]]  (Normal) Collected: 08/09/22 1221    Specimen: Blood Updated: 08/09/22 1257     Magnesium 1.8 mg/dL     Troponin [[id number]]  (Normal) Collected: 08/09/22 1221    Specimen: Blood Updated: 08/09/22 1257     Troponin T <0.010 ng/mL     Narrative:      Troponin T Reference Range:  <= 0.03 ng/mL-   Negative for AMI  >0.03 ng/mL-     Abnormal for myocardial necrosis.  Clinicians would have to utilize clinical acumen, EKG, Troponin and serial changes to determine if it is an Acute Myocardial Infarction or myocardial injury due to an underlying chronic condition.       Results may be falsely decreased if patient taking Biotin.      Lactic Acid, Plasma [[id number]]  (Normal) Collected: 08/09/22 1221    Specimen: Blood Updated: 08/09/22 1255     Lactate 0.9 mmol/L     BNP [[id number]]  (Normal) Collected: 08/09/22 1221    Specimen: Blood Updated: 08/09/22 1254     proBNP 209.8 pg/mL     Narrative:      Among patients with dyspnea, NT-proBNP is highly sensitive for the detection of acute congestive heart failure. In addition NT-proBNP of <300 pg/ml effectively rules out acute congestive heart failure with 99% negative predictive value.    Results may be falsely decreased if patient taking Biotin.      D-dimer, Quantitative [[id number]]  (Normal) " Collected: 08/09/22 1221    Specimen: Blood Updated: 08/09/22 1247     D-Dimer, Quantitative 0.42 MCGFEU/mL     Narrative:      Reference Range is 0-0.50 MCGFEU/mL. However, results <0.50 MCGFEU/mL tends to rule out DVT or PE. Results >0.50 MCGFEU/mL are not useful in predicting absence or presence of DVT or PE.      Blood Gas, Arterial - [[id number]]  (Abnormal) Collected: 08/09/22 1240    Specimen: Arterial Blood Updated: 08/09/22 1240     Site Left Radial     Ming's Test Negative     pH, Arterial 7.326 pH units      Comment: 84 Value below reference range        pCO2, Arterial 67.2 mm Hg      Comment: 86 Value above critical limit        pO2, Arterial 105.0 mm Hg      HCO3, Arterial 35.1 mmol/L      Comment: 83 Value above reference range        Base Excess, Arterial 6.7 mmol/L      Comment: 83 Value above reference range        O2 Saturation, Arterial 98.5 %      Temperature 37.0 C      Barometric Pressure for Blood Gas 752 mmHg      Modality Nasal Cannula     Flow Rate 4.0 lpm      Ventilator Mode NA     Notified Who LENORE PEDERSEN     Notified By [id number]     Notified Time 08/09/2022 12:41     Collected by [id number]     Comment: Meter: [id number]     :  [id number]        pCO2, Temperature Corrected 67.2 mm Hg      pH, Temp Corrected 7.326 pH Units      pO2, Temperature Corrected 105 mm Hg     CBC & Differential [[id number]]  (Abnormal) Collected: 08/09/22 1221    Specimen: Blood Updated: 08/09/22 1234    Narrative:      The following orders were created for panel order CBC & Differential.  Procedure                               Abnormality         Status                     ---------                               -----------         ------                     CBC Auto Differential[[id number]]        Abnormal            Final result                 Please view results for these tests on the individual orders.    CBC Auto Differential [[id number]]  (Abnormal) Collected: 08/09/22 1221    Specimen:  Blood Updated: 08/09/22 1234     WBC 11.08 10*3/mm3      RBC 4.16 10*6/mm3      Hemoglobin 13.4 g/dL      Hematocrit 42.1 %      .2 fL      MCH 32.2 pg      MCHC 31.8 g/dL      RDW 13.2 %      RDW-SD 49.3 fl      MPV 10.3 fL      Platelets 265 10*3/mm3      Neutrophil % 61.3 %      Lymphocyte % 22.9 %      Monocyte % 8.2 %      Eosinophil % 6.8 %      Basophil % 0.6 %      Immature Grans % 0.2 %      Neutrophils, Absolute 6.79 10*3/mm3      Lymphocytes, Absolute 2.54 10*3/mm3      Monocytes, Absolute 0.91 10*3/mm3      Eosinophils, Absolute 0.75 10*3/mm3      Basophils, Absolute 0.07 10*3/mm3      Immature Grans, Absolute 0.02 10*3/mm3      nRBC 0.0 /100 WBC               Echo EF Estimated  Lab Results   Component Value Date    ECHOEFEST 45 08/10/2022         Cath Ejection Fraction Quantitative  No results found for: CATHEF        Medication Review: yes  Current Facility-Administered Medications   Medication Dose Route Frequency Provider Last Rate Last Admin   • acetaminophen (TYLENOL) tablet 650 mg  650 mg Oral Q4H PRN Driss Carroll MD   650 mg at 08/11/22 0826    Or   • acetaminophen (TYLENOL) suppository 650 mg  650 mg Rectal Q4H PRN Driss Carroll MD       • albuterol (PROVENTIL) nebulizer solution 0.5% 2.5 mg/0.5mL  1.25 mg Nebulization 4x Daily - RT Driss Carroll MD   1.25 mg at 08/11/22 1452    And   • ipratropium (ATROVENT) nebulizer solution 0.5 mg  0.5 mg Nebulization 4x Daily - RT Driss Carroll MD   0.5 mg at 08/11/22 1452   • atorvastatin (LIPITOR) tablet 40 mg  40 mg Oral Nightly Driss Carroll MD   40 mg at 08/10/22 1957   • budesonide-formoterol (SYMBICORT) 160-4.5 MCG/ACT inhaler 2 puff  2 puff Inhalation BID - RT Driss Carroll MD   2 puff at 08/11/22 0727   • cetirizine (zyrTEC) tablet 10 mg  10 mg Oral Daily Carroll, Driss K, MD   10 mg at 08/11/22 0826   • dextrose (D50W) (25 g/50 mL) IV injection 25 g  25 g Intravenous Q15 Min PRN Driss Carroll MD       • dextrose (GLUTOSE) oral gel 15 g   15 g Oral Q15 Min PRN Driss Carroll MD       • Enoxaparin Sodium (LOVENOX) syringe 30 mg  30 mg Subcutaneous Q24H Alexa Jackman APRN   30 mg at 08/10/22 1959   • glucagon (human recombinant) (GLUCAGEN DIAGNOSTIC) injection 1 mg  1 mg Intramuscular Q15 Min PRN Driss Carroll MD       • guaiFENesin (MUCINEX) 12 hr tablet 600 mg  600 mg Oral Q12H Driss Carroll MD   600 mg at 08/11/22 0826   • HYDROcodone-acetaminophen (NORCO) 5-325 MG per tablet 1 tablet  1 tablet Oral Q4H PRN Driss Carroll MD       • Insulin Lispro (humaLOG) injection 2-7 Units  2-7 Units Subcutaneous TID AC Driss Carroll MD       • [START ON 8/12/2022] lisinopril (PRINIVIL,ZESTRIL) tablet 2.5 mg  2.5 mg Oral Q24H Alexa Martinez APRN       • methylPREDNISolone sodium succinate (SOLU-Medrol) injection 40 mg  40 mg Intravenous Q12H Danii Mensah APRN       • [START ON 8/12/2022] metoprolol succinate XL (TOPROL-XL) 24 hr tablet 12.5 mg  12.5 mg Oral Q24H Alexa Martinez APRN       • ondansetron (ZOFRAN) tablet 4 mg  4 mg Oral Q6H PRN Driss Carroll MD        Or   • ondansetron (ZOFRAN) injection 4 mg  4 mg Intravenous Q6H PRN Driss Carroll MD       • sertraline (ZOLOFT) tablet 25 mg  25 mg Oral Daily Driss Carroll MD   25 mg at 08/11/22 0826   • sodium chloride 0.9 % flush 10 mL  10 mL Intravenous PRN Driss Carroll MD       • sodium chloride 0.9 % flush 10 mL  10 mL Intravenous Q12H Driss Carroll MD   10 mL at 08/10/22 1958     Assessment & Plan       Acute respiratory failure with hypercapnia (HCC)    Chronic respiratory failure with hypoxia (HCC)    Pulmonary fibrosis (HCC)    COPD exacerbation (HCC)    Hyperglycemia    Essential hypertension    Elevated troponin due to Takotsubo myocarditis and COPD exacerbation    Takotsubo syndrome    Mixed hyperlipidemia    Plan:   Takotsubo Cardiomyopathy: noted per cath completed yesterday. Will change Lopressor to Toprol and start low dose lisinopril 2.5mg daily.     Elevated Troponin due to  Takotsubo myocarditis and COPD    COPD: continued treatment per attending.    Cardiology will sign off at this time. She will need a follow up in my office in 6 weeks. She prefers afternoon appointments.       Further recommendations per Dr. Carroll     Electronically signed by JOHANNE Ramires, 08/11/22, 3:56 PM CDT.

## 2022-08-11 NOTE — PLAN OF CARE
Problem: Adult Inpatient Plan of Care  Goal: Plan of Care Review  Outcome: Ongoing, Progressing  Flowsheets (Taken 8/11/2022 0245)  Progress: improving  Plan of Care Reviewed With: patient  Outcome Evaluation: Patient post cath to the right groin.  No interventions.  No blockages.  Patient has Takasubo's because she became so scared by her increased shortness of air.  She has a right groin angioseal.  No c/o pain,  Cont. to monitor.  call for concerns.   Goal Outcome Evaluation:  Plan of Care Reviewed With: patient        Progress: improving  Outcome Evaluation: Patient post cath to the right groin.  No interventions.  No blockages.  Patient has Takasubo's because she became so scared by her increased shortness of air.  She has a right groin angioseal.  No c/o pain,  Cont. to monitor.  call for concerns.

## 2022-08-12 ENCOUNTER — READMISSION MANAGEMENT (OUTPATIENT)
Dept: CALL CENTER | Facility: HOSPITAL | Age: 72
End: 2022-08-12

## 2022-08-12 VITALS
HEART RATE: 90 BPM | TEMPERATURE: 97.7 F | WEIGHT: 169.53 LBS | RESPIRATION RATE: 16 BRPM | HEIGHT: 63 IN | OXYGEN SATURATION: 96 % | BODY MASS INDEX: 30.04 KG/M2 | SYSTOLIC BLOOD PRESSURE: 126 MMHG | DIASTOLIC BLOOD PRESSURE: 77 MMHG

## 2022-08-12 LAB
ANION GAP SERPL CALCULATED.3IONS-SCNC: 4 MMOL/L (ref 5–15)
BASOPHILS # BLD AUTO: 0.01 10*3/MM3 (ref 0–0.2)
BASOPHILS NFR BLD AUTO: 0.1 % (ref 0–1.5)
BUN SERPL-MCNC: 12 MG/DL (ref 8–23)
BUN/CREAT SERPL: 34.3 (ref 7–25)
CALCIUM SPEC-SCNC: 8.1 MG/DL (ref 8.6–10.5)
CHLORIDE SERPL-SCNC: 104 MMOL/L (ref 98–107)
CO2 SERPL-SCNC: 33 MMOL/L (ref 22–29)
CREAT SERPL-MCNC: 0.35 MG/DL (ref 0.57–1)
DEPRECATED RDW RBC AUTO: 47.8 FL (ref 37–54)
EGFRCR SERPLBLD CKD-EPI 2021: 110.8 ML/MIN/1.73
EOSINOPHIL # BLD AUTO: 0 10*3/MM3 (ref 0–0.4)
EOSINOPHIL NFR BLD AUTO: 0 % (ref 0.3–6.2)
ERYTHROCYTE [DISTWIDTH] IN BLOOD BY AUTOMATED COUNT: 13.3 % (ref 12.3–15.4)
GLUCOSE BLDC GLUCOMTR-MCNC: 103 MG/DL (ref 70–130)
GLUCOSE SERPL-MCNC: 150 MG/DL (ref 65–99)
HCT VFR BLD AUTO: 35.9 % (ref 34–46.6)
HGB BLD-MCNC: 11.8 G/DL (ref 12–15.9)
IMM GRANULOCYTES # BLD AUTO: 0.04 10*3/MM3 (ref 0–0.05)
IMM GRANULOCYTES NFR BLD AUTO: 0.6 % (ref 0–0.5)
LYMPHOCYTES # BLD AUTO: 0.84 10*3/MM3 (ref 0.7–3.1)
LYMPHOCYTES NFR BLD AUTO: 12.1 % (ref 19.6–45.3)
MCH RBC QN AUTO: 32.4 PG (ref 26.6–33)
MCHC RBC AUTO-ENTMCNC: 32.9 G/DL (ref 31.5–35.7)
MCV RBC AUTO: 98.6 FL (ref 79–97)
MONOCYTES # BLD AUTO: 0.6 10*3/MM3 (ref 0.1–0.9)
MONOCYTES NFR BLD AUTO: 8.6 % (ref 5–12)
NEUTROPHILS NFR BLD AUTO: 5.45 10*3/MM3 (ref 1.7–7)
NEUTROPHILS NFR BLD AUTO: 78.6 % (ref 42.7–76)
NRBC BLD AUTO-RTO: 0 /100 WBC (ref 0–0.2)
PLATELET # BLD AUTO: 229 10*3/MM3 (ref 140–450)
PMV BLD AUTO: 10.9 FL (ref 6–12)
POTASSIUM SERPL-SCNC: 3.8 MMOL/L (ref 3.5–5.2)
RBC # BLD AUTO: 3.64 10*6/MM3 (ref 3.77–5.28)
SODIUM SERPL-SCNC: 141 MMOL/L (ref 136–145)
WBC NRBC COR # BLD: 6.94 10*3/MM3 (ref 3.4–10.8)

## 2022-08-12 PROCEDURE — 94664 DEMO&/EVAL PT USE INHALER: CPT

## 2022-08-12 PROCEDURE — 94799 UNLISTED PULMONARY SVC/PX: CPT

## 2022-08-12 PROCEDURE — 94660 CPAP INITIATION&MGMT: CPT

## 2022-08-12 PROCEDURE — 85025 COMPLETE CBC W/AUTO DIFF WBC: CPT | Performed by: INTERNAL MEDICINE

## 2022-08-12 PROCEDURE — 82962 GLUCOSE BLOOD TEST: CPT

## 2022-08-12 PROCEDURE — 80048 BASIC METABOLIC PNL TOTAL CA: CPT | Performed by: INTERNAL MEDICINE

## 2022-08-12 RX ORDER — ATORVASTATIN CALCIUM 40 MG/1
40 TABLET, FILM COATED ORAL NIGHTLY
Qty: 30 TABLET | Refills: 1 | Status: SHIPPED | OUTPATIENT
Start: 2022-08-12 | End: 2022-09-23 | Stop reason: SDUPTHER

## 2022-08-12 RX ORDER — GUAIFENESIN 600 MG/1
600 TABLET, EXTENDED RELEASE ORAL EVERY 12 HOURS SCHEDULED
Qty: 20 TABLET | Refills: 0 | Status: SHIPPED | OUTPATIENT
Start: 2022-08-12

## 2022-08-12 RX ORDER — PREDNISONE 10 MG/1
TABLET ORAL
Qty: 14 TABLET | Refills: 0 | Status: SHIPPED | OUTPATIENT
Start: 2022-08-12 | End: 2022-09-23

## 2022-08-12 RX ADMIN — BUDESONIDE AND FORMOTEROL FUMARATE DIHYDRATE 2 PUFF: 160; 4.5 AEROSOL RESPIRATORY (INHALATION) at 07:03

## 2022-08-12 RX ADMIN — METOPROLOL SUCCINATE 12.5 MG: 25 TABLET, EXTENDED RELEASE ORAL at 08:19

## 2022-08-12 RX ADMIN — ACETAMINOPHEN 650 MG: 325 TABLET, FILM COATED ORAL at 06:41

## 2022-08-12 RX ADMIN — ALBUTEROL SULFATE 1.25 MG: 2.5 SOLUTION RESPIRATORY (INHALATION) at 06:57

## 2022-08-12 RX ADMIN — GUAIFENESIN 600 MG: 600 TABLET, EXTENDED RELEASE ORAL at 08:19

## 2022-08-12 RX ADMIN — IPRATROPIUM BROMIDE 0.5 MG: 0.5 SOLUTION RESPIRATORY (INHALATION) at 06:57

## 2022-08-12 RX ADMIN — CETIRIZINE HYDROCHLORIDE 10 MG: 10 TABLET ORAL at 08:19

## 2022-08-12 RX ADMIN — LISINOPRIL 2.5 MG: 2.5 TABLET ORAL at 08:19

## 2022-08-12 RX ADMIN — SERTRALINE HYDROCHLORIDE 25 MG: 50 TABLET, FILM COATED ORAL at 08:19

## 2022-08-12 NOTE — DISCHARGE SUMMARY
[facility] Medicine Services  DISCHARGE SUMMARY     Date of Admission: 8/9/2022  Date of Discharge:  8/12/2022  Primary Care Physician: Sonu Lennon MD    Presenting Problem/Chief Complaint:  Shortness of breath    Final Discharge Diagnoses:  Active Hospital Problems    Diagnosis    • **Acute respiratory failure with hypercapnia (HCC)    • Elevated troponin due to Takotsubo myocarditis and COPD exacerbation    • Takotsubo syndrome    • Mixed hyperlipidemia    • COPD exacerbation (HCC)    • Hyperglycemia suspect secondary to steroids    • Essential hypertension    • Pulmonary fibrosis (HCC)    • Chronic respiratory failure with hypoxia (HCC)      Consults: Dr. Carroll, cardiology    Procedures Performed:         Cardiac catheterization 8/10/2022     Conclusion:  Normal coronary arteries  LVEF 40% with anteroapical dyskinesis consistent with Takotsubo's myocarditis     Pertinent Test Results:   Results for orders placed during the hospital encounter of 08/09/22    Adult Transthoracic Echo Complete W/ Cont if Necessary Per Protocol    Interpretation Summary  · Calculated left ventricular 3D EF = 55% Estimated left ventricular EF = 45% Left ventricular systolic function is low normal.  · Left ventricular diastolic dysfunction is noted.  · The following left ventricular wall segments are hypokinetic: apical anterior, apical lateral, apical inferior, apical septal and apex hypokinetic.      Imaging Results (All)     Procedure Component Value Units Date/Time    XR Chest 1 View [[id number]] Collected: 08/09/22 1416     Updated: 08/09/22 1420    Narrative:      EXAMINATION: XR CHEST 1 VW- 8/9/2022 2:16 PM CDT     HISTORY: shortness of breath.     REPORT: A frontal view of the chest was obtained.     COMPARISON: Chest x-ray 3/1/2019.     The lungs are hyperinflated, there are coarse interstitial markings as  before. This is compatible with COPD. No infiltrate or consolidation  is  identified. Heart size is upper limits of normal. No evidence of CHF is  identified. There is no pneumothorax or pleural effusion. Surgical clips  are noted in the axilla bilaterally.       Impression:      Stable chronic lung changes and evidence of COPD with  interstitial fibrosis. No superimposed acute infiltrates are identified.  This report was finalized on 08/09/2022 14:17 by Dr. Driss Lopez MD.        Date: 08/11/22                                                                                                     ROOM AIR BASELINE   SpO2%                88   Heart Rate            76   Blood Pressure       EXERCISE ON ROOM AIR SpO2% EXERCISE ON O2 @ 2 LPM SpO2%   1 MINUTE 88 1 MINUTE 97   2 MINUTES   2 MINUTES 95   3 MINUTES   3 MINUTES     4 MINUTES   4 MINUTES 95   5 MINUTES   5 MINUTES     6 MINUTES   6 MINUTES                                                                                                                   Distance Walked   Distance Walked       100 feet   Dyspnea (Bob Scale)   Dyspnea (Bob Scale)   Fatigue (Bob Scale) Fatigue (Bob Scale)   SpO2% Post Exercise   SpO2% Post Exercise   HR Post Exercise   HR Post Exercise            126   Time to Recovery   Time to Recovery      Comments: placed pt on room air and O2 sat 88% after standing, applied O2 at 1lpm walked 50 feet and pulse ox dropped to 85% with a  increased O2 to 2lpm and pt pox stayed around 95% for the rest of walk              LAB RESULTS:      Lab 08/12/22  0421 08/11/22  0321 08/10/22  0531 08/09/22  1221   WBC 6.94 6.96 5.38 11.08*   HEMOGLOBIN 11.8* 11.9* 12.9 13.4   HEMATOCRIT 35.9 36.0 38.3 42.1   PLATELETS 229 258 259 265   NEUTROS ABS 5.45 6.22 4.64 6.79   IMMATURE GRANS (ABS) 0.04 0.03 0.05 0.02   LYMPHS ABS 0.84 0.45* 0.60* 2.54   MONOS ABS 0.60 0.25 0.08* 0.91*   EOS ABS 0.00 0.00 0.00 0.75*   MCV 98.6* 97.6* 96.7 101.2*   PROCALCITONIN  --   --   --  0.04   LACTATE  --   --   --  0.9   D DIMER  QUANT  --   --   --  0.42         Lab 08/12/22  0421 08/11/22  0321 08/10/22  0531 08/09/22  1221   SODIUM 141 140 138 141   POTASSIUM 3.8 3.7 3.2* 3.7   CHLORIDE 104 103 96* 97*   CO2 33.0* 32.0* 31.0* 36.0*   ANION GAP 4.0* 5.0 11.0 8.0   BUN 12 14 9 11   CREATININE 0.35* 0.38* 0.25* 0.37*   EGFR 110.8 108.6 120.2 109.3   GLUCOSE 150* 165* 141* 175*   CALCIUM 8.1* 8.6 9.0 9.3   MAGNESIUM  --   --   --  1.8   HEMOGLOBIN A1C  --   --  5.00  --          Lab 08/09/22  1221   TOTAL PROTEIN 7.6   ALBUMIN 4.80   GLOBULIN 2.8   ALT (SGPT) 23   AST (SGOT) 27   BILIRUBIN 0.5   ALK PHOS 60         Lab 08/10/22  0531 08/09/22  1740 08/09/22  1221   PROBNP  --   --  209.8   TROPONIN T 0.073* 0.124* <0.010         Lab 08/10/22  0531   CHOLESTEROL 230*   LDL CHOL 114*   HDL CHOL 107*   TRIGLYCERIDES 52             Lab 08/09/22  1409 08/09/22  1240   PH, ARTERIAL 7.440 7.326*   PCO2, ARTERIAL 51.8* 67.2*   PO2 .0 105.0   O2 SATURATION ART 98.2 98.5   FIO2 30  --    HCO3 ART 35.2* 35.1*   BASE EXCESS ART 9.3* 6.7*     Brief Urine Lab Results     None        Microbiology Results (last 10 days)     Procedure Component Value - Date/Time    Blood Culture - Blood, Hand, Right [[id number]]  (Normal) Collected: 08/09/22 1406    Lab Status: Preliminary result Specimen: Blood from Hand, Right Updated: 08/11/22 1432     Blood Culture No growth at 2 days    COVID-19,[facility] [employer] IN-HOUSE,Nasal Swab No Transport Media 3-4 HR TAT - Swab, Nasal Cavity [[id number]]  (Normal) Collected: 08/09/22 1305    Lab Status: Final result Specimen: Swab from Nasal Cavity Updated: 08/09/22 1410     COVID19 Not Detected    Narrative:      Fact sheet for providers: https://www.fda.gov/media/892030/download     Fact sheet for patients: https://www.fda.gov/media/244656/download    Test performed by PCR.    Consider negative results in combination with clinical observations, patient history, and epidemiological information.    Blood Culture - Blood, Arm, Right  "[[id number]]  (Normal) Collected: 08/09/22 1221    Lab Status: Preliminary result Specimen: Blood from Arm, Right Updated: 08/11/22 1246     Blood Culture No growth at 2 days        Chief Complaint on Day of Discharge: No complaints.  Feels she is at baseline.    History of Present Illness on Day of Discharge:   Sitting up in bed.  Oxygen at 2 L.  She denies nausea, vomiting or abdominal pain.  Denies chest pain or palpitations.  She ambulated short distance.  Discussed maintaining oxygen at 2 L and increasing with activity as needed.  She will keep scheduled appointment with pulmonary medicine.  New medications discussed with patient.    Hospital Course:  The patient is a 71 y.o. female who presented to [facility] emergency room 8/9/2022 with complaints of shortness of breath and chest pain.  She describes \"tightness\".  Patient wears oxygen at 4 L continuously at home.  She used her albuterol inhaler without improvement of symptoms. She was diagnosed with COVID-19 on July 19th but symptoms resolved.  She awoke 8/9 with pressure associated with weakness she described different from COPD exacerbations.  She was hypertensive and hypercapnic in ER.  Initial troponin 0.124.  EKG on arrival not concerning for acute changes. PCO2 67 and required BiPAP in ER.  DuoNeb, Solu-Medrol, magnesium given in ER.    She was admitted to the medical floor with acute respiratory failure with hypercapnia, COPD exacerbation.  Home medications reviewed and restarted if appropriate.  Lovenox ordered for deep vein thrombosis prophylaxis.  Normally, patient wears oxygen at 4 L at home.  PCO2 67 in the emergency room and required BiPAP.  She was transitioned back to cannula and eventually weaned to 1 to 2 L.  Walking oximetry performed on 8/11/2022 saturation 88% on room air with standing.  She was placed back on oxygen and ambulated.  Saturation decreased to 85% with 1 L.  She was increased to oxygen at 2 L.  Patient has " maintained saturation 95 to 96% on 1-2 L.  Recommend patient remain on oxygen at 2 L continuously and she may increase oxygen level to 3 L with activity if needed.  She has follow-up appointment scheduled with Dr. Kendrick 10/4/2022 and will keep scheduled appointment.    Elevated troponin secondary toTakotsubo myocarditis and COPD exacerbation.  Cardiology consulted.  Echocardiogram showed ejection fraction 45% with left ventricular diastolic dysfunction.  Multiple left ventricular wall segments were hypokinetic including apical anterior, apical lateral, apical inferior, apical septal.  Repeat EKG showed T wave inversions lead II, V4 and VF.  Troponin 0.01, 0.124, 0.073.  Cardiac catheterization by Dr. Carroll 8/10/22 showed normal coronary arteries with LVEF 40% with anteroapical dyskinesis consistent with Takotsubo myocarditis.  Patient was started on beta-blocker and transitioned to Toprol XL 12.5 mg orally daily, lisinopril 2.5 mg daily (ACE inhibitor) started.  Discussed with JOHANNE Fontanez and no need to continue aspirin as patient noted to have normal coronaries with cardiac catheterization.  She will follow-up with JOHANNE Fontanez with cardiology on 9/23/2022.    She was treated for COPD exacerbation.  Again, patient normally wears oxygen at 4 L at home but she was able to be weaned down to 1 to 2 L.  She was placed on Solu-Medrol IV which was tapered and transitioned to tapering dose of prednisone at discharge.  Symbicort, Mucinex and half-strength DuoNeb treatments continued.  Blood cultures remain no growth. She will keep scheduled appointment with Dr. Kendrick 10/4/2022.    Cholesterol 230, , .  Atorvastatin 40 mg orally nightly started.    Pressure elevated on admission and improved with metoprolol.  Metoprolol was transitioned to metoprolol XL 12.5 mg orally daily.  Blood pressure stable 126/77 at discharge.    Glucoses elevated suspect secondary to steroids.    On 8/12/2022 she is stable  "for discharge.  Oxygen has been weaned down to 1 to 2 L and she is maintaining saturation 95 to 96% at rest.  She has ambulated.  Beta-blocker, ACE inhibitor and statin started prior to discharge.  She will follow-up with Dr. Lennon on 8/16/2022 and follow-up with JOHANNE Fontanez with cardiology on 9/23/2022.  Patient will keep scheduled appointment with Dr. Kendrick on 10/4/2022.    Condition on Discharge:  Stable    Physical Exam on Discharge:  /77 (BP Location: Right arm, Patient Position: Sitting)   Pulse 90   Temp 97.7 °F (36.5 °C) (Oral)   Resp 16   Ht 160 cm (62.99\")   Wt 76.9 kg (169 lb 8.5 oz)   SpO2 96%   BMI 30.04 kg/m²   Physical Exam  Vitals and nursing note reviewed.   Constitutional:       Comments: Sitting up in bed.  Oxygen 2 L.  Saturation 96%.   HENT:      Head: Normocephalic and atraumatic.      Nose: No congestion.      Mouth/Throat:      Pharynx: Oropharynx is clear. No oropharyngeal exudate or posterior oropharyngeal erythema.   Eyes:      Extraocular Movements: Extraocular movements intact.      Pupils: Pupils are equal, round, and reactive to light.   Cardiovascular:      Rate and Rhythm: Normal rate and regular rhythm.      Heart sounds: No murmur heard.     Comments: Normal sinus rhythm at 76 on telemetry.  Pulmonary:      Breath sounds: No wheezing, rhonchi or rales.      Comments: Oxygen at 2 L. Decreased breath sounds. No wheezing hear today. No sputum production.  Abdominal:      Palpations: Abdomen is soft.      Tenderness: There is no abdominal tenderness.   Genitourinary:     Comments: Voiding.    Musculoskeletal:         General: No swelling or tenderness.      Cervical back: Normal range of motion and neck supple.   Skin:     General: Skin is warm and dry.   Neurological:      General: No focal deficit present.      Mental Status: She is alert and oriented to person, place, and time.   Psychiatric:         Mood and Affect: Mood normal.         Behavior: Behavior " normal.         Thought Content: Thought content normal.         Judgment: Judgment normal.       Discharge Disposition:  Home or Self Care    Discharge Medications:     Discharge Medications      New Medications      Instructions Start Date   atorvastatin 40 MG tablet  Commonly known as: LIPITOR   40 mg, Oral, Nightly      guaiFENesin 600 MG 12 hr tablet  Commonly known as: MUCINEX   600 mg, Oral, Every 12 Hours Scheduled      lisinopril 2.5 MG tablet  Commonly known as: PRINIVIL,ZESTRIL   2.5 mg, Oral, Daily      metoprolol succinate XL 25 MG 24 hr tablet  Commonly known as: TOPROL-XL   Take 0.5 tablet by mouth Daily.      predniSONE 10 MG tablet  Commonly known as: DELTASONE   Take 4 tablets for 2 days begin 8/13/2022, then 2 tablets for 2 days, then 1 tablet for 2 days         Continue These Medications      Instructions Start Date   acetaminophen 500 MG tablet  Commonly known as: TYLENOL   500 mg, Oral, Every 6 Hours PRN      albuterol sulfate  (90 Base) MCG/ACT inhaler  Commonly known as: Ventolin HFA   2 puffs, Inhalation, Every 4 Hours PRN      Biotin 79269 MCG tablet   1,000 mcg, Oral, Daily      budesonide-formoterol 160-4.5 MCG/ACT inhaler  Commonly known as: SYMBICORT   2 puffs, Inhalation, 2 Times Daily - RT      cetirizine 10 MG tablet  Commonly known as: zyrTEC   10 mg, Oral, Daily      hydroCHLOROthiazide 25 MG tablet  Commonly known as: HYDRODIURIL   25 mg, Oral, Daily      sertraline 25 MG tablet  Commonly known as: ZOLOFT   25 mg, Oral, Daily      tiotropium 18 MCG per inhalation capsule  Commonly known as: SPIRIVA   1 capsule, Inhalation, Daily - RT           Discharge Diet:   Diet Instructions     Advance Diet As Tolerated            Activity at Discharge:   Activity Instructions     Activity as Tolerated          Discharge Care Plan/Instructions:   1.  For worsening shortness of breath seek medical attention.  2.  Oxygen 2 L continuously.  May increase oxygen to 3 to 4 L with activity  with decreased saturation.  Keep scheduled appointment with Dr. Kendrick 10/4/2022.  3.  Tapering dose of prednisone at discharge.  4.  Toprol-XL 12.5 mg orally daily per cardiology  5.  Lisinopril 2.5 mg orally daily per cardiology  6.  Follow-up with Dr. Lennon 8/16/22.  7.  Follow up with JOHANNE Fontanez 9/23/22  8.  Lipitor 40 mg nightly.    Follow-up Appointments:   Future Appointments   Date Time Provider Department Center   9/23/2022  9:30 AM Alexa Martinez APRN [facility]   10/4/2022  2:45 PM THERAPIST RESPIRATORY DI PAD [facility]   10/4/2022  3:00 PM Ar Kendrick MD [facility]   Follow-up with Dr. Lennon 8/16/2022.      Test Results Pending at Discharge: None    Electronically signed by JOHANNE Acharya, 08/12/22, 07:59 CDT.    Time: 35 minutes for completion. Discussed with Dr. Cano, JOHANNE Fontanez and patient.    The above documentation resulted from a face-to-face encounter by me Danii WHITING, [facility].

## 2022-08-12 NOTE — PROGRESS NOTES
Exercise Oximetry    Patient Name:Joana Cuevas   MRN: [id number]   Date: 08/11/22             ROOM AIR BASELINE   SpO2%                88   Heart Rate            76   Blood Pressure      EXERCISE ON ROOM AIR SpO2% EXERCISE ON O2 @ 2 LPM SpO2%   1 MINUTE 88 1 MINUTE 97   2 MINUTES  2 MINUTES 95   3 MINUTES  3 MINUTES    4 MINUTES  4 MINUTES 95   5 MINUTES  5 MINUTES    6 MINUTES  6 MINUTES               Distance Walked   Distance Walked       100 feet   Dyspnea (Bob Scale)   Dyspnea (Bob Scale)   Fatigue (Bob Scale) Fatigue (Bob Scale)   SpO2% Post Exercise   SpO2% Post Exercise   HR Post Exercise   HR Post Exercise            126   Time to Recovery   Time to Recovery     Comments: placed pt on room air and O2 sat 88% after standing, applied O2 at 1lpm walked 50 feet and pulse ox dropped to 85% with a  increased O2 to 2lpm and pt pox stayed around 95% for the rest of walk

## 2022-08-12 NOTE — PLAN OF CARE
Goal Outcome Evaluation: PATIENT ON CONTINOUS O2 PER NASAL CANNULA.  TOLERATING AMBULATING TO BATHROOM ON O2 WITH NO ACUTE DISTRESS OBSERVED.  ABLE TO STAND WITHOUT ASSIST.  PROGRESSING WITH COPD.  NO FALL REPORTED OR OBSERVED.  GAIT APPEARS STEADY WITH USE OF WALKER.  VITAL SIGNS STABLE.  WILL CONTINUE TO OBSERVE.

## 2022-08-14 LAB
BACTERIA SPEC AEROBE CULT: NORMAL
BACTERIA SPEC AEROBE CULT: NORMAL

## 2022-08-15 ENCOUNTER — READMISSION MANAGEMENT (OUTPATIENT)
Dept: CALL CENTER | Facility: HOSPITAL | Age: 72
End: 2022-08-15

## 2022-08-15 NOTE — PAYOR COMM NOTE
"REF: [id number]  [facility]  RICHARD  [phone]  OR   FAX   [phone]       Joana Cuevas (71 y.o. Female)             Date of Birth   1950    Social Security Number       Address   [address] [place] [address]    Home Phone   [phone]    MRN   [id number]       [place]   Confucianism    Marital Status                               Admission Date   8/9/22    Admission Type   Emergency    Admitting Provider   Xu Haque MD    Attending Provider       Department, Room/Bed   [facility] 4B, 463/1       Discharge Date   8/12/2022    Discharge Disposition   Home or Self Care    Discharge Destination   Home                            Attending Provider: (none)   Allergies: No Known Allergies    Isolation: None   Infection: None   Code Status: Prior   Advance Care Planning Activity    Ht: 160 cm (62.99\")   Wt: 76.9 kg (169 lb 8.5 oz)    Admission Cmt: None   Principal Problem: Acute respiratory failure with hypercapnia (HCC) [J96.02]                 Active Insurance as of 8/9/2022     Primary Coverage     Payor Plan Insurance Group Employer/Plan Group    ANTHEM MEDICARE REPLACEMENT ANTHEM MEDICARE ADVANTAGE [id number]     Payor Plan Address Payor Plan Phone Number Payor Plan Fax Number Effective Dates    [address] [phone]  2/1/2021 - None Entered    [facility] [id number]       Subscriber Name Subscriber Birth Date Member ID       JOANA CUEVAS 1950 [id number]                 Emergency Contacts      (Rel.) Home Phone Work Phone Mobile Phone    REDDBIRDIE (Spouse) [phone] -- --    PATI SUN (Daughter) [phone] -- --    REDDDEV (Daughter) -- -- [phone]               Discharge Summary      Danii Mensah, APRN at 08/12/22 0719              [facility] Medicine Services  DISCHARGE SUMMARY     Date of Admission: 8/9/2022  Date of Discharge:  8/12/2022  Primary Care " Physician: Sonu Lennon MD    Presenting Problem/Chief Complaint:  Shortness of breath    Final Discharge Diagnoses:  Active Hospital Problems    Diagnosis    • **Acute respiratory failure with hypercapnia (HCC)    • Elevated troponin due to Takotsubo myocarditis and COPD exacerbation    • Takotsubo syndrome    • Mixed hyperlipidemia    • COPD exacerbation (HCC)    • Hyperglycemia suspect secondary to steroids    • Essential hypertension    • Pulmonary fibrosis (HCC)    • Chronic respiratory failure with hypoxia (HCC)      Consults: Dr. Carroll, cardiology    Procedures Performed:         Cardiac catheterization 8/10/2022     Conclusion:  Normal coronary arteries  LVEF 40% with anteroapical dyskinesis consistent with Takotsubo's myocarditis     Pertinent Test Results:   Results for orders placed during the hospital encounter of 08/09/22    Adult Transthoracic Echo Complete W/ Cont if Necessary Per Protocol    Interpretation Summary  · Calculated left ventricular 3D EF = 55% Estimated left ventricular EF = 45% Left ventricular systolic function is low normal.  · Left ventricular diastolic dysfunction is noted.  · The following left ventricular wall segments are hypokinetic: apical anterior, apical lateral, apical inferior, apical septal and apex hypokinetic.      Imaging Results (All)     Procedure Component Value Units Date/Time    XR Chest 1 View [[id number]] Collected: 08/09/22 1416     Updated: 08/09/22 1420    Narrative:      EXAMINATION: XR CHEST 1 VW- 8/9/2022 2:16 PM CDT     HISTORY: shortness of breath.     REPORT: A frontal view of the chest was obtained.     COMPARISON: Chest x-ray 3/1/2019.     The lungs are hyperinflated, there are coarse interstitial markings as  before. This is compatible with COPD. No infiltrate or consolidation is  identified. Heart size is upper limits of normal. No evidence of CHF is  identified. There is no pneumothorax or pleural effusion. Surgical clips  are noted in the axilla  bilaterally.       Impression:      Stable chronic lung changes and evidence of COPD with  interstitial fibrosis. No superimposed acute infiltrates are identified.  This report was finalized on 08/09/2022 14:17 by Dr. Driss Lopez MD.        Date: 08/11/22                                                                                                     ROOM AIR BASELINE   SpO2%                88   Heart Rate            76   Blood Pressure       EXERCISE ON ROOM AIR SpO2% EXERCISE ON O2 @ 2 LPM SpO2%   1 MINUTE 88 1 MINUTE 97   2 MINUTES   2 MINUTES 95   3 MINUTES   3 MINUTES     4 MINUTES   4 MINUTES 95   5 MINUTES   5 MINUTES     6 MINUTES   6 MINUTES                                                                                                                   Distance Walked   Distance Walked       100 feet   Dyspnea (Bob Scale)   Dyspnea (Bob Scale)   Fatigue (Bob Scale) Fatigue (Bob Scale)   SpO2% Post Exercise   SpO2% Post Exercise   HR Post Exercise   HR Post Exercise            126   Time to Recovery   Time to Recovery      Comments: placed pt on room air and O2 sat 88% after standing, applied O2 at 1lpm walked 50 feet and pulse ox dropped to 85% with a  increased O2 to 2lpm and pt pox stayed around 95% for the rest of walk              LAB RESULTS:      Lab 08/12/22  0421 08/11/22  0321 08/10/22  0531 08/09/22  1221   WBC 6.94 6.96 5.38 11.08*   HEMOGLOBIN 11.8* 11.9* 12.9 13.4   HEMATOCRIT 35.9 36.0 38.3 42.1   PLATELETS 229 258 259 265   NEUTROS ABS 5.45 6.22 4.64 6.79   IMMATURE GRANS (ABS) 0.04 0.03 0.05 0.02   LYMPHS ABS 0.84 0.45* 0.60* 2.54   MONOS ABS 0.60 0.25 0.08* 0.91*   EOS ABS 0.00 0.00 0.00 0.75*   MCV 98.6* 97.6* 96.7 101.2*   PROCALCITONIN  --   --   --  0.04   LACTATE  --   --   --  0.9   D DIMER QUANT  --   --   --  0.42         Lab 08/12/22  0421 08/11/22  0321 08/10/22  0531 08/09/22  1221   SODIUM 141 140 138 141   POTASSIUM 3.8 3.7 3.2* 3.7   CHLORIDE 104 103 96* 97*    CO2 33.0* 32.0* 31.0* 36.0*   ANION GAP 4.0* 5.0 11.0 8.0   BUN 12 14 9 11   CREATININE 0.35* 0.38* 0.25* 0.37*   EGFR 110.8 108.6 120.2 109.3   GLUCOSE 150* 165* 141* 175*   CALCIUM 8.1* 8.6 9.0 9.3   MAGNESIUM  --   --   --  1.8   HEMOGLOBIN A1C  --   --  5.00  --          Lab 08/09/22  1221   TOTAL PROTEIN 7.6   ALBUMIN 4.80   GLOBULIN 2.8   ALT (SGPT) 23   AST (SGOT) 27   BILIRUBIN 0.5   ALK PHOS 60         Lab 08/10/22  0531 08/09/22  1740 08/09/22  1221   PROBNP  --   --  209.8   TROPONIN T 0.073* 0.124* <0.010         Lab 08/10/22  0531   CHOLESTEROL 230*   LDL CHOL 114*   HDL CHOL 107*   TRIGLYCERIDES 52             Lab 08/09/22  1409 08/09/22  1240   PH, ARTERIAL 7.440 7.326*   PCO2, ARTERIAL 51.8* 67.2*   PO2 .0 105.0   O2 SATURATION ART 98.2 98.5   FIO2 30  --    HCO3 ART 35.2* 35.1*   BASE EXCESS ART 9.3* 6.7*     Brief Urine Lab Results     None        Microbiology Results (last 10 days)     Procedure Component Value - Date/Time    Blood Culture - Blood, Hand, Right [[id number]]  (Normal) Collected: 08/09/22 1406    Lab Status: Preliminary result Specimen: Blood from Hand, Right Updated: 08/11/22 1432     Blood Culture No growth at 2 days    COVID-19,[facility] IN-HOUSE,Nasal Swab No Transport Media 3-4 HR TAT - Swab, Nasal Cavity [[id number]]  (Normal) Collected: 08/09/22 1305    Lab Status: Final result Specimen: Swab from Nasal Cavity Updated: 08/09/22 1410     COVID19 Not Detected    Narrative:      Fact sheet for providers: https://www.fda.gov/media/008252/download     Fact sheet for patients: https://www.fda.gov/media/006129/download    Test performed by PCR.    Consider negative results in combination with clinical observations, patient history, and epidemiological information.    Blood Culture - Blood, Arm, Right [[id number]]  (Normal) Collected: 08/09/22 1221    Lab Status: Preliminary result Specimen: Blood from Arm, Right Updated: 08/11/22 1246     Blood Culture No growth at 2 days     "    Chief Complaint on Day of Discharge: No complaints.  Feels she is at baseline.    History of Present Illness on Day of Discharge:   Sitting up in bed.  Oxygen at 2 L.  She denies nausea, vomiting or abdominal pain.  Denies chest pain or palpitations.  She ambulated short distance.  Discussed maintaining oxygen at 2 L and increasing with activity as needed.  She will keep scheduled appointment with pulmonary medicine.  New medications discussed with patient.    Hospital Course:  The patient is a 71 y.o. female who presented to [facility] emergency room 8/9/2022 with complaints of shortness of breath and chest pain.  She describes \"tightness\".  Patient wears oxygen at 4 L continuously at home.  She used her albuterol inhaler without improvement of symptoms. She was diagnosed with COVID-19 on July 19th but symptoms resolved.  She awoke 8/9 with pressure associated with weakness she described different from COPD exacerbations.  She was hypertensive and hypercapnic in ER.  Initial troponin 0.124.  EKG on arrival not concerning for acute changes. PCO2 67 and required BiPAP in ER.  DuoNeb, Solu-Medrol, magnesium given in ER.    She was admitted to the medical floor with acute respiratory failure with hypercapnia, COPD exacerbation.  Home medications reviewed and restarted if appropriate.  Lovenox ordered for deep vein thrombosis prophylaxis.  Normally, patient wears oxygen at 4 L at home.  PCO2 67 in the emergency room and required BiPAP.  She was transitioned back to cannula and eventually weaned to 1 to 2 L.  Walking oximetry performed on 8/11/2022 saturation 88% on room air with standing.  She was placed back on oxygen and ambulated.  Saturation decreased to 85% with 1 L.  She was increased to oxygen at 2 L.  Patient has maintained saturation 95 to 96% on 1-2 L.  Recommend patient remain on oxygen at 2 L continuously and she may increase oxygen level to 3 L with activity if needed.  She has follow-up " appointment scheduled with Dr. Kendrick 10/4/2022 and will keep scheduled appointment.    Elevated troponin secondary toTakotsubo myocarditis and COPD exacerbation.  Cardiology consulted.  Echocardiogram showed ejection fraction 45% with left ventricular diastolic dysfunction.  Multiple left ventricular wall segments were hypokinetic including apical anterior, apical lateral, apical inferior, apical septal.  Repeat EKG showed T wave inversions lead II, V4 and VF.  Troponin 0.01, 0.124, 0.073.  Cardiac catheterization by Dr. Carroll 8/10/22 showed normal coronary arteries with LVEF 40% with anteroapical dyskinesis consistent with Takotsubo myocarditis.  Patient was started on beta-blocker and transitioned to Toprol XL 12.5 mg orally daily, lisinopril 2.5 mg daily (ACE inhibitor) started.  Discussed with JOHANNE Fontanez and no need to continue aspirin as patient noted to have normal coronaries with cardiac catheterization.  She will follow-up with JOHANNE Fontanez with cardiology on 9/23/2022.    She was treated for COPD exacerbation.  Again, patient normally wears oxygen at 4 L at home but she was able to be weaned down to 1 to 2 L.  She was placed on Solu-Medrol IV which was tapered and transitioned to tapering dose of prednisone at discharge.  Symbicort, Mucinex and half-strength DuoNeb treatments continued.  Blood cultures remain no growth. She will keep scheduled appointment with Dr. Kendrick 10/4/2022.    Cholesterol 230, , .  Atorvastatin 40 mg orally nightly started.    Pressure elevated on admission and improved with metoprolol.  Metoprolol was transitioned to metoprolol XL 12.5 mg orally daily.  Blood pressure stable 126/77 at discharge.    Glucoses elevated suspect secondary to steroids.    On 8/12/2022 she is stable for discharge.  Oxygen has been weaned down to 1 to 2 L and she is maintaining saturation 95 to 96% at rest.  She has ambulated.  Beta-blocker, ACE inhibitor and statin started prior  "to discharge.  She will follow-up with Dr. Lennon on 8/16/2022 and follow-up with JOHANNE Fontanez with cardiology on 9/23/2022.  Patient will keep scheduled appointment with Dr. Kendrick on 10/4/2022.    Condition on Discharge:  Stable    Physical Exam on Discharge:  /77 (BP Location: Right arm, Patient Position: Sitting)   Pulse 90   Temp 97.7 °F (36.5 °C) (Oral)   Resp 16   Ht 160 cm (62.99\")   Wt 76.9 kg (169 lb 8.5 oz)   SpO2 96%   BMI 30.04 kg/m²   Physical Exam  Vitals and nursing note reviewed.   Constitutional:       Comments: Sitting up in bed.  Oxygen 2 L.  Saturation 96%.   HENT:      Head: Normocephalic and atraumatic.      Nose: No congestion.      Mouth/Throat:      Pharynx: Oropharynx is clear. No oropharyngeal exudate or posterior oropharyngeal erythema.   Eyes:      Extraocular Movements: Extraocular movements intact.      Pupils: Pupils are equal, round, and reactive to light.   Cardiovascular:      Rate and Rhythm: Normal rate and regular rhythm.      Heart sounds: No murmur heard.     Comments: Normal sinus rhythm at 76 on telemetry.  Pulmonary:      Breath sounds: No wheezing, rhonchi or rales.      Comments: Oxygen at 2 L. Decreased breath sounds. No wheezing hear today. No sputum production.  Abdominal:      Palpations: Abdomen is soft.      Tenderness: There is no abdominal tenderness.   Genitourinary:     Comments: Voiding.    Musculoskeletal:         General: No swelling or tenderness.      Cervical back: Normal range of motion and neck supple.   Skin:     General: Skin is warm and dry.   Neurological:      General: No focal deficit present.      Mental Status: She is alert and oriented to person, place, and time.   Psychiatric:         Mood and Affect: Mood normal.         Behavior: Behavior normal.         Thought Content: Thought content normal.         Judgment: Judgment normal.       Discharge Disposition:  Home or Self Care    Discharge Medications:     Discharge " Medications      New Medications      Instructions Start Date   atorvastatin 40 MG tablet  Commonly known as: LIPITOR   40 mg, Oral, Nightly      guaiFENesin 600 MG 12 hr tablet  Commonly known as: MUCINEX   600 mg, Oral, Every 12 Hours Scheduled      lisinopril 2.5 MG tablet  Commonly known as: PRINIVIL,ZESTRIL   2.5 mg, Oral, Daily      metoprolol succinate XL 25 MG 24 hr tablet  Commonly known as: TOPROL-XL   Take 0.5 tablet by mouth Daily.      predniSONE 10 MG tablet  Commonly known as: DELTASONE   Take 4 tablets for 2 days begin 8/13/2022, then 2 tablets for 2 days, then 1 tablet for 2 days         Continue These Medications      Instructions Start Date   acetaminophen 500 MG tablet  Commonly known as: TYLENOL   500 mg, Oral, Every 6 Hours PRN      albuterol sulfate  (90 Base) MCG/ACT inhaler  Commonly known as: Ventolin HFA   2 puffs, Inhalation, Every 4 Hours PRN      Biotin 30145 MCG tablet   1,000 mcg, Oral, Daily      budesonide-formoterol 160-4.5 MCG/ACT inhaler  Commonly known as: SYMBICORT   2 puffs, Inhalation, 2 Times Daily - RT      cetirizine 10 MG tablet  Commonly known as: zyrTEC   10 mg, Oral, Daily      hydroCHLOROthiazide 25 MG tablet  Commonly known as: HYDRODIURIL   25 mg, Oral, Daily      sertraline 25 MG tablet  Commonly known as: ZOLOFT   25 mg, Oral, Daily      tiotropium 18 MCG per inhalation capsule  Commonly known as: SPIRIVA   1 capsule, Inhalation, Daily - RT           Discharge Diet:   Diet Instructions     Advance Diet As Tolerated            Activity at Discharge:   Activity Instructions     Activity as Tolerated          Discharge Care Plan/Instructions:   1.  For worsening shortness of breath seek medical attention.  2.  Oxygen 2 L continuously.  May increase oxygen to 3 to 4 L with activity with decreased saturation.  Keep scheduled appointment with Dr. Kendrick 10/4/2022.  3.  Tapering dose of prednisone at discharge.  4.  Toprol-XL 12.5 mg orally daily per cardiology  5.   Lisinopril 2.5 mg orally daily per cardiology  6.  Follow-up with Dr. Lennon 8/16/22.  7.  Follow up with JOHANNE Fontanez 9/23/22  8.  Lipitor 40 mg nightly.    Follow-up Appointments:   Future Appointments   Date Time Provider Department Center   9/23/2022  9:30 AM Alexa Martinez APRN [facility]   10/4/2022  2:45 PM THERAPIST RESPIRATORY DI PAD [facility]   10/4/2022  3:00 PM Ar Kendrick MD [facility]   Follow-up with Dr. Lennon 8/16/2022.      Test Results Pending at Discharge: None    Electronically signed by JOHANNE Acharya, 08/12/22, 07:59 CDT.    Time: 35 minutes for completion. Discussed with Dr. Cano, JOHANNE Fontanez and patient.    The above documentation resulted from a face-to-face encounter by me Danii WHITING, [facility].          Electronically signed by Danii Mensah APRN at 08/12/22 0800       Discharge Order (From admission, onward)     Start     Ordered    08/12/22 0729  Discharge patient  Once        Expected Discharge Date: 08/12/22    Discharge Disposition: Home or Self Care    Physician of Record for Attribution - Please select from Treatment Team: JOSE GUADALUPE VORA [[id number]]    Review needed by CMO to determine Physician of Record: No       Question Answer Comment   Physician of Record for Attribution - Please select from Treatment Team JOSE GUADALUPE VORA    Review needed by CMO to determine Physician of Record No        08/12/22 0733

## 2022-08-15 NOTE — PAYOR COMM NOTE
"[facility] 8-12-22  [id number]   [phone]    Joana Cuevas (71 y.o. 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    Marital Status                               Admission Date   8/9/22    Admission Type   Emergency    Admitting Provider   Xu Haque MD    Attending Provider       Department, Room/Bed   [facility] 4B, 463/1       Discharge Date   8/12/2022    Discharge Disposition   Home or Self Care    Discharge Destination   Home                            Attending Provider: (none)   Allergies: No Known Allergies    Isolation: None   Infection: None   Code Status: Prior   Advance Care Planning Activity    Ht: 160 cm (62.99\")   Wt: 76.9 kg (169 lb 8.5 oz)    Admission Cmt: None   Principal Problem: Acute respiratory failure with hypercapnia (HCC) [J96.02]                 Active Insurance as of 8/9/2022     Primary Coverage     Payor Plan Insurance Group Employer/Plan Group    ANTHEM MEDICARE REPLACEMENT ANTHEM MEDICARE ADVANTAGE [id number]     Payor Plan Address Payor Plan Phone Number Payor Plan Fax Number Effective Dates    [address] [phone]  2/1/2021 - None Entered    [facility] [id number]       Subscriber Name Subscriber Birth Date Member ID       JOANA CUEVAS 1950 [id number]                 Emergency Contacts      (Rel.) Home Phone Work Phone Mobile Phone    REDDBIRDIE (Spouse) [phone] -- --    PATI SUN (Daughter) [phone] -- --    DEV CUEVAS (Daughter) -- -- [phone]            Physician Progress Notes (last 72 hours)  Notes from 08/12/22 0902 through 08/15/22 0902   No notes of this type exist for this encounter.            Discharge Summary      Danii Mensah, APRN at 08/12/22 0719              [facility] Medicine " Services  DISCHARGE SUMMARY     Date of Admission: 8/9/2022  Date of Discharge:  8/12/2022  Primary Care Physician: Sonu Lennon MD    Presenting Problem/Chief Complaint:  Shortness of breath    Final Discharge Diagnoses:  Active Hospital Problems    Diagnosis    • **Acute respiratory failure with hypercapnia (HCC)    • Elevated troponin due to Takotsubo myocarditis and COPD exacerbation    • Takotsubo syndrome    • Mixed hyperlipidemia    • COPD exacerbation (HCC)    • Hyperglycemia suspect secondary to steroids    • Essential hypertension    • Pulmonary fibrosis (HCC)    • Chronic respiratory failure with hypoxia (HCC)      Consults: Dr. Carroll, cardiology    Procedures Performed:         Cardiac catheterization 8/10/2022     Conclusion:  Normal coronary arteries  LVEF 40% with anteroapical dyskinesis consistent with Takotsubo's myocarditis     Pertinent Test Results:   Results for orders placed during the hospital encounter of 08/09/22    Adult Transthoracic Echo Complete W/ Cont if Necessary Per Protocol    Interpretation Summary  · Calculated left ventricular 3D EF = 55% Estimated left ventricular EF = 45% Left ventricular systolic function is low normal.  · Left ventricular diastolic dysfunction is noted.  · The following left ventricular wall segments are hypokinetic: apical anterior, apical lateral, apical inferior, apical septal and apex hypokinetic.      Imaging Results (All)     Procedure Component Value Units Date/Time    XR Chest 1 View [[id number]] Collected: 08/09/22 1416     Updated: 08/09/22 1420    Narrative:      EXAMINATION: XR CHEST 1 VW- 8/9/2022 2:16 PM CDT     HISTORY: shortness of breath.     REPORT: A frontal view of the chest was obtained.     COMPARISON: Chest x-ray 3/1/2019.     The lungs are hyperinflated, there are coarse interstitial markings as  before. This is compatible with COPD. No infiltrate or consolidation is  identified. Heart size is upper limits of normal. No evidence of  CHF is  identified. There is no pneumothorax or pleural effusion. Surgical clips  are noted in the axilla bilaterally.       Impression:      Stable chronic lung changes and evidence of COPD with  interstitial fibrosis. No superimposed acute infiltrates are identified.  This report was finalized on 08/09/2022 14:17 by Dr. Driss Lopez MD.        Date: 08/11/22                                                                                                     ROOM AIR BASELINE   SpO2%                88   Heart Rate            76   Blood Pressure       EXERCISE ON ROOM AIR SpO2% EXERCISE ON O2 @ 2 LPM SpO2%   1 MINUTE 88 1 MINUTE 97   2 MINUTES   2 MINUTES 95   3 MINUTES   3 MINUTES     4 MINUTES   4 MINUTES 95   5 MINUTES   5 MINUTES     6 MINUTES   6 MINUTES                                                                                                                   Distance Walked   Distance Walked       100 feet   Dyspnea (Bob Scale)   Dyspnea (Bob Scale)   Fatigue (Bob Scale) Fatigue (Bob Scale)   SpO2% Post Exercise   SpO2% Post Exercise   HR Post Exercise   HR Post Exercise            126   Time to Recovery   Time to Recovery      Comments: placed pt on room air and O2 sat 88% after standing, applied O2 at 1lpm walked 50 feet and pulse ox dropped to 85% with a  increased O2 to 2lpm and pt pox stayed around 95% for the rest of walk              LAB RESULTS:      Lab 08/12/22  0421 08/11/22  0321 08/10/22  0531 08/09/22  1221   WBC 6.94 6.96 5.38 11.08*   HEMOGLOBIN 11.8* 11.9* 12.9 13.4   HEMATOCRIT 35.9 36.0 38.3 42.1   PLATELETS 229 258 259 265   NEUTROS ABS 5.45 6.22 4.64 6.79   IMMATURE GRANS (ABS) 0.04 0.03 0.05 0.02   LYMPHS ABS 0.84 0.45* 0.60* 2.54   MONOS ABS 0.60 0.25 0.08* 0.91*   EOS ABS 0.00 0.00 0.00 0.75*   MCV 98.6* 97.6* 96.7 101.2*   PROCALCITONIN  --   --   --  0.04   LACTATE  --   --   --  0.9   D DIMER QUANT  --   --   --  0.42         Lab 08/12/22 0421 08/11/22 0321  08/10/22  0531 08/09/22  1221   SODIUM 141 140 138 141   POTASSIUM 3.8 3.7 3.2* 3.7   CHLORIDE 104 103 96* 97*   CO2 33.0* 32.0* 31.0* 36.0*   ANION GAP 4.0* 5.0 11.0 8.0   BUN 12 14 9 11   CREATININE 0.35* 0.38* 0.25* 0.37*   EGFR 110.8 108.6 120.2 109.3   GLUCOSE 150* 165* 141* 175*   CALCIUM 8.1* 8.6 9.0 9.3   MAGNESIUM  --   --   --  1.8   HEMOGLOBIN A1C  --   --  5.00  --          Lab 08/09/22  1221   TOTAL PROTEIN 7.6   ALBUMIN 4.80   GLOBULIN 2.8   ALT (SGPT) 23   AST (SGOT) 27   BILIRUBIN 0.5   ALK PHOS 60         Lab 08/10/22  0531 08/09/22  1740 08/09/22  1221   PROBNP  --   --  209.8   TROPONIN T 0.073* 0.124* <0.010         Lab 08/10/22  0531   CHOLESTEROL 230*   LDL CHOL 114*   HDL CHOL 107*   TRIGLYCERIDES 52             Lab 08/09/22  1409 08/09/22  1240   PH, ARTERIAL 7.440 7.326*   PCO2, ARTERIAL 51.8* 67.2*   PO2 .0 105.0   O2 SATURATION ART 98.2 98.5   FIO2 30  --    HCO3 ART 35.2* 35.1*   BASE EXCESS ART 9.3* 6.7*     Brief Urine Lab Results     None        Microbiology Results (last 10 days)     Procedure Component Value - Date/Time    Blood Culture - Blood, Hand, Right [[id number]]  (Normal) Collected: 08/09/22 1406    Lab Status: Preliminary result Specimen: Blood from Hand, Right Updated: 08/11/22 1432     Blood Culture No growth at 2 days    COVID-19,[facility] IN-HOUSE,Nasal Swab No Transport Media 3-4 HR TAT - Swab, Nasal Cavity [[id number]]  (Normal) Collected: 08/09/22 1305    Lab Status: Final result Specimen: Swab from Nasal Cavity Updated: 08/09/22 1410     COVID19 Not Detected    Narrative:      Fact sheet for providers: https://www.fda.gov/media/052100/download     Fact sheet for patients: https://www.fda.gov/media/458856/download    Test performed by PCR.    Consider negative results in combination with clinical observations, patient history, and epidemiological information.    Blood Culture - Blood, Arm, Right [[id number]]  (Normal) Collected: 08/09/22 1221    Lab Status:  "Preliminary result Specimen: Blood from Arm, Right Updated: 08/11/22 1246     Blood Culture No growth at 2 days        Chief Complaint on Day of Discharge: No complaints.  Feels she is at baseline.    History of Present Illness on Day of Discharge:   Sitting up in bed.  Oxygen at 2 L.  She denies nausea, vomiting or abdominal pain.  Denies chest pain or palpitations.  She ambulated short distance.  Discussed maintaining oxygen at 2 L and increasing with activity as needed.  She will keep scheduled appointment with pulmonary medicine.  New medications discussed with patient.    Hospital Course:  The patient is a 71 y.o. female who presented to [facility] emergency room 8/9/2022 with complaints of shortness of breath and chest pain.  She describes \"tightness\".  Patient wears oxygen at 4 L continuously at home.  She used her albuterol inhaler without improvement of symptoms. She was diagnosed with COVID-19 on July 19th but symptoms resolved.  She awoke 8/9 with pressure associated with weakness she described different from COPD exacerbations.  She was hypertensive and hypercapnic in ER.  Initial troponin 0.124.  EKG on arrival not concerning for acute changes. PCO2 67 and required BiPAP in ER.  DuoNeb, Solu-Medrol, magnesium given in ER.    She was admitted to the medical floor with acute respiratory failure with hypercapnia, COPD exacerbation.  Home medications reviewed and restarted if appropriate.  Lovenox ordered for deep vein thrombosis prophylaxis.  Normally, patient wears oxygen at 4 L at home.  PCO2 67 in the emergency room and required BiPAP.  She was transitioned back to cannula and eventually weaned to 1 to 2 L.  Walking oximetry performed on 8/11/2022 saturation 88% on room air with standing.  She was placed back on oxygen and ambulated.  Saturation decreased to 85% with 1 L.  She was increased to oxygen at 2 L.  Patient has maintained saturation 95 to 96% on 1-2 L.  Recommend patient remain on " oxygen at 2 L continuously and she may increase oxygen level to 3 L with activity if needed.  She has follow-up appointment scheduled with Dr. Kendrick 10/4/2022 and will keep scheduled appointment.    Elevated troponin secondary toTakotsubo myocarditis and COPD exacerbation.  Cardiology consulted.  Echocardiogram showed ejection fraction 45% with left ventricular diastolic dysfunction.  Multiple left ventricular wall segments were hypokinetic including apical anterior, apical lateral, apical inferior, apical septal.  Repeat EKG showed T wave inversions lead II, V4 and VF.  Troponin 0.01, 0.124, 0.073.  Cardiac catheterization by Dr. Carroll 8/10/22 showed normal coronary arteries with LVEF 40% with anteroapical dyskinesis consistent with Takotsubo myocarditis.  Patient was started on beta-blocker and transitioned to Toprol XL 12.5 mg orally daily, lisinopril 2.5 mg daily (ACE inhibitor) started.  Discussed with JOHANNE Fontanez and no need to continue aspirin as patient noted to have normal coronaries with cardiac catheterization.  She will follow-up with JOHANNE Fontanez with cardiology on 9/23/2022.    She was treated for COPD exacerbation.  Again, patient normally wears oxygen at 4 L at home but she was able to be weaned down to 1 to 2 L.  She was placed on Solu-Medrol IV which was tapered and transitioned to tapering dose of prednisone at discharge.  Symbicort, Mucinex and half-strength DuoNeb treatments continued.  Blood cultures remain no growth. She will keep scheduled appointment with Dr. Kendrick 10/4/2022.    Cholesterol 230, , .  Atorvastatin 40 mg orally nightly started.    Pressure elevated on admission and improved with metoprolol.  Metoprolol was transitioned to metoprolol XL 12.5 mg orally daily.  Blood pressure stable 126/77 at discharge.    Glucoses elevated suspect secondary to steroids.    On 8/12/2022 she is stable for discharge.  Oxygen has been weaned down to 1 to 2 L and she is  "maintaining saturation 95 to 96% at rest.  She has ambulated.  Beta-blocker, ACE inhibitor and statin started prior to discharge.  She will follow-up with Dr. Lennon on 8/16/2022 and follow-up with JOHANNE Fontanez with cardiology on 9/23/2022.  Patient will keep scheduled appointment with Dr. Kendrick on 10/4/2022.    Condition on Discharge:  Stable    Physical Exam on Discharge:  /77 (BP Location: Right arm, Patient Position: Sitting)   Pulse 90   Temp 97.7 °F (36.5 °C) (Oral)   Resp 16   Ht 160 cm (62.99\")   Wt 76.9 kg (169 lb 8.5 oz)   SpO2 96%   BMI 30.04 kg/m²   Physical Exam  Vitals and nursing note reviewed.   Constitutional:       Comments: Sitting up in bed.  Oxygen 2 L.  Saturation 96%.   HENT:      Head: Normocephalic and atraumatic.      Nose: No congestion.      Mouth/Throat:      Pharynx: Oropharynx is clear. No oropharyngeal exudate or posterior oropharyngeal erythema.   Eyes:      Extraocular Movements: Extraocular movements intact.      Pupils: Pupils are equal, round, and reactive to light.   Cardiovascular:      Rate and Rhythm: Normal rate and regular rhythm.      Heart sounds: No murmur heard.     Comments: Normal sinus rhythm at 76 on telemetry.  Pulmonary:      Breath sounds: No wheezing, rhonchi or rales.      Comments: Oxygen at 2 L. Decreased breath sounds. No wheezing hear today. No sputum production.  Abdominal:      Palpations: Abdomen is soft.      Tenderness: There is no abdominal tenderness.   Genitourinary:     Comments: Voiding.    Musculoskeletal:         General: No swelling or tenderness.      Cervical back: Normal range of motion and neck supple.   Skin:     General: Skin is warm and dry.   Neurological:      General: No focal deficit present.      Mental Status: She is alert and oriented to person, place, and time.   Psychiatric:         Mood and Affect: Mood normal.         Behavior: Behavior normal.         Thought Content: Thought content normal.         " Judgment: Judgment normal.       Discharge Disposition:  Home or Self Care    Discharge Medications:     Discharge Medications      New Medications      Instructions Start Date   atorvastatin 40 MG tablet  Commonly known as: LIPITOR   40 mg, Oral, Nightly      guaiFENesin 600 MG 12 hr tablet  Commonly known as: MUCINEX   600 mg, Oral, Every 12 Hours Scheduled      lisinopril 2.5 MG tablet  Commonly known as: PRINIVIL,ZESTRIL   2.5 mg, Oral, Daily      metoprolol succinate XL 25 MG 24 hr tablet  Commonly known as: TOPROL-XL   Take 0.5 tablet by mouth Daily.      predniSONE 10 MG tablet  Commonly known as: DELTASONE   Take 4 tablets for 2 days begin 8/13/2022, then 2 tablets for 2 days, then 1 tablet for 2 days         Continue These Medications      Instructions Start Date   acetaminophen 500 MG tablet  Commonly known as: TYLENOL   500 mg, Oral, Every 6 Hours PRN      albuterol sulfate  (90 Base) MCG/ACT inhaler  Commonly known as: Ventolin HFA   2 puffs, Inhalation, Every 4 Hours PRN      Biotin 01107 MCG tablet   1,000 mcg, Oral, Daily      budesonide-formoterol 160-4.5 MCG/ACT inhaler  Commonly known as: SYMBICORT   2 puffs, Inhalation, 2 Times Daily - RT      cetirizine 10 MG tablet  Commonly known as: zyrTEC   10 mg, Oral, Daily      hydroCHLOROthiazide 25 MG tablet  Commonly known as: HYDRODIURIL   25 mg, Oral, Daily      sertraline 25 MG tablet  Commonly known as: ZOLOFT   25 mg, Oral, Daily      tiotropium 18 MCG per inhalation capsule  Commonly known as: SPIRIVA   1 capsule, Inhalation, Daily - RT           Discharge Diet:   Diet Instructions     Advance Diet As Tolerated            Activity at Discharge:   Activity Instructions     Activity as Tolerated          Discharge Care Plan/Instructions:   1.  For worsening shortness of breath seek medical attention.  2.  Oxygen 2 L continuously.  May increase oxygen to 3 to 4 L with activity with decreased saturation.  Keep scheduled appointment with   Mireille 10/4/2022.  3.  Tapering dose of prednisone at discharge.  4.  Toprol-XL 12.5 mg orally daily per cardiology  5.  Lisinopril 2.5 mg orally daily per cardiology  6.  Follow-up with Dr. Lennon 8/16/22.  7.  Follow up with JOHANNE Fontanez 9/23/22  8.  Lipitor 40 mg nightly.    Follow-up Appointments:   Future Appointments   Date Time Provider Department Center   9/23/2022  9:30 AM Alexa Martinez APRN [facility]   10/4/2022  2:45 PM THERAPIST RESPIRATORY DI PAD [facility] P[facility]   10/4/2022  3:00 PM Ar Kendrick MD [facility] PAD   Follow-up with Dr. Lennon 8/16/2022.      Test Results Pending at Discharge: None    Electronically signed by JOHANNE Acharya, 08/12/22, 07:59 CDT.    Time: 35 minutes for completion. Discussed with Dr. Cano, JOHANNE Fontanez and patient.    The above documentation resulted from a face-to-face encounter by me Danii WHITING, [facility].          Electronically signed by Danii Mensah APRN at 08/12/22 0800

## 2022-08-15 NOTE — OUTREACH NOTE
COPD/PN Week 1 Survey    Flowsheet Row Responses   [employer] facility patient discharged from? [place]   Does the patient have one of the following disease processes/diagnoses(primary or secondary)? COPD/Pneumonia   Was the primary reason for admission: COPD exacerbation   Week 1 attempt successful? No   Unsuccessful attempts Attempt 1          KATHI Joseph Registered Nurse

## 2022-08-18 ENCOUNTER — READMISSION MANAGEMENT (OUTPATIENT)
Dept: CALL CENTER | Facility: HOSPITAL | Age: 72
End: 2022-08-18

## 2022-08-23 ENCOUNTER — READMISSION MANAGEMENT (OUTPATIENT)
Dept: CALL CENTER | Facility: HOSPITAL | Age: 72
End: 2022-08-23

## 2022-08-23 NOTE — OUTREACH NOTE
COPD/PN Week 1 Survey    Flowsheet Row Responses   [facility] patient discharged from? [place]   Does the patient have one of the following disease processes/diagnoses(primary or secondary)? COPD/Pneumonia   Was the primary reason for admission: COPD exacerbation   Week 1 attempt successful? Yes   Call start time 1435   Discharge diagnosis Acute respiratory failure with hypercapnia, Elevated troponin due to Takotsubo myocarditis and COPD exacerbation   Meds reviewed with patient/caregiver? Yes   Is the patient having any side effects they believe may be caused by any medication additions or changes? No   Does the patient have all medications ordered at discharge? Yes   Is the patient taking all medications as directed (includes completed medication regime)? Yes   Does the patient have a primary care provider?  Yes   Does the patient have an appointment with their PCP or specialist within 7 days of discharge? Yes   Has the patient kept scheduled appointments due by today? Yes   Psychosocial issues? No   Did the patient receive a copy of their discharge instructions? Yes   Nursing interventions Reviewed instructions with patient   What is the patient's perception of their health status since discharge? Improving   Nursing Interventions Nurse provided patient education   If the patient is a current smoker, are they able to teach back resources for cessation? Not a smoker   Is the patient/caregiver able to teach back the hierarchy of who to call/visit for symptoms/problems? PCP, Specialist, Home health nurse, Urgent Care, ED, 911 Yes   Is the patient able to teach back COPD zones? Yes   Nursing interventions Education provided on various zones   Patient reports what zone on this call? Green Zone   Green Zone Reports doing well   Green Zone interventions: Take daily medications, Avoid indoor/outdoor triggers   Week 1 call completed? Yes   Wrap up additional comments Doing very well.           EVANS STEWART - Registered  Nurse

## 2022-08-31 ENCOUNTER — READMISSION MANAGEMENT (OUTPATIENT)
Dept: CALL CENTER | Facility: HOSPITAL | Age: 72
End: 2022-08-31

## 2022-08-31 NOTE — OUTREACH NOTE
COPD/PN Week 2 Survey    Flowsheet Row Responses   [employer] facility patient discharged from? [place]   Does the patient have one of the following disease processes/diagnoses(primary or secondary)? COPD   Week 2 attempt successful? No   Unsuccessful attempts Attempt 1   Discharge diagnosis Acute respiratory failure with hypercapnia, Elevated troponin due to Takotsubo myocarditis and COPD exacerbation          VALERIO RAPHAEL - Registered Nurse

## 2022-09-23 ENCOUNTER — OFFICE VISIT (OUTPATIENT)
Dept: CARDIOLOGY | Facility: CLINIC | Age: 72
End: 2022-09-23

## 2022-09-23 VITALS
WEIGHT: 164 LBS | SYSTOLIC BLOOD PRESSURE: 124 MMHG | OXYGEN SATURATION: 96 % | DIASTOLIC BLOOD PRESSURE: 70 MMHG | HEART RATE: 108 BPM | BODY MASS INDEX: 29.06 KG/M2 | HEIGHT: 63 IN

## 2022-09-23 DIAGNOSIS — I51.81 TAKOTSUBO CARDIOMYOPATHY: Primary | ICD-10-CM

## 2022-09-23 DIAGNOSIS — I10 ESSENTIAL HYPERTENSION: ICD-10-CM

## 2022-09-23 DIAGNOSIS — J44.9 STAGE 4 VERY SEVERE COPD BY GOLD CLASSIFICATION: ICD-10-CM

## 2022-09-23 PROCEDURE — 99214 OFFICE O/P EST MOD 30 MIN: CPT | Performed by: NURSE PRACTITIONER

## 2022-09-23 PROCEDURE — 93000 ELECTROCARDIOGRAM COMPLETE: CPT | Performed by: NURSE PRACTITIONER

## 2022-09-23 RX ORDER — ATORVASTATIN CALCIUM 40 MG/1
40 TABLET, FILM COATED ORAL NIGHTLY
Qty: 90 TABLET | Refills: 3 | Status: SHIPPED | OUTPATIENT
Start: 2022-09-23

## 2022-09-23 RX ORDER — AZELASTINE HYDROCHLORIDE 0.5 MG/ML
SOLUTION/ DROPS OPHTHALMIC
COMMUNITY
Start: 2022-09-10

## 2022-09-23 NOTE — PROGRESS NOTES
"    Subjective:     Encounter Date:09/23/2022      Patient ID: Joana Cuevas is a 72 y.o. female with Takostubo cardiomyopathy and COPD, .    Chief Complaint:\"my BP has been low\"  Cardiomyopathy  This is a new problem. The current episode started more than 1 month ago. The problem occurs daily. The problem has been unchanged.     Patient presents today for a hospital follow up. She was seen as a hospital consult in August for an elevated troponin. She had a 2D echo that revealed 40% therefore she underwent a LHC that revealed normal coronary arteries with presence of Takostubo cardiomyopathy. She was started on Lisinopril and Toprol due to reduced EF.     Today she reports she has been fair with the exception of her BP being \"low\" with SBP ranging 106 with associated dizziness. She notes she stopped her lisinopril, Toprol, and hydrodiuril on Thursday and notes some improvement in her dizziness. She notes her chronic CRISTOBAL remains unchanged. She notes occasional palpitations after exertion that subsides after catching her breath. She denies chest pain, edema, orthopnea and PND.     The following portions of the patient's history were reviewed and updated as appropriate: allergies, current medications, past family history, past medical history, past social history, past surgical history and problem list.    No Known Allergies    Current Outpatient Medications:   •  acetaminophen (TYLENOL) 500 MG tablet, Take 500 mg by mouth Every 6 (Six) Hours As Needed for Mild Pain ., Disp: , Rfl:   •  albuterol sulfate HFA (Ventolin HFA) 108 (90 Base) MCG/ACT inhaler, Inhale 2 puffs Every 4 (Four) Hours As Needed for Wheezing., Disp: 54 g, Rfl: 3  •  atorvastatin (LIPITOR) 40 MG tablet, Take 1 tablet by mouth Every Night., Disp: 90 tablet, Rfl: 3  •  azelastine (OPTIVAR) 0.05 % ophthalmic solution, INSTILL 1 DROP INTO AFFECTED EYE TWICE A DAY, Disp: , Rfl:   •  Biotin 34326 MCG tablet, Take 1,000 mcg by mouth Daily., Disp: , Rfl: "   •  budesonide-formoterol (SYMBICORT) 160-4.5 MCG/ACT inhaler, Inhale 2 puffs 2 (Two) Times a Day., Disp: , Rfl:   •  cetirizine (zyrTEC) 10 MG tablet, Take 10 mg by mouth Daily., Disp: , Rfl:   •  guaiFENesin (MUCINEX) 600 MG 12 hr tablet, Take 1 tablet by mouth Every 12 (Twelve) Hours., Disp: 20 tablet, Rfl: 0  •  sertraline (ZOLOFT) 25 MG tablet, Take 25 mg by mouth Daily., Disp: , Rfl:   •  tiotropium (SPIRIVA) 18 MCG per inhalation capsule, Place 1 capsule into inhaler and inhale Daily., Disp: , Rfl:   Past Medical History:   Diagnosis Date   • Breast cancer (HCC)    • Cancer (HCC)    • Chronic bronchitis (HCC)    • Guillain Barré syndrome (HCC) 2016   • Laceration of head 2019   • Pneumonia    • Syncope 2019       Social History     Socioeconomic History   • Marital status:    Tobacco Use   • Smoking status: Former Smoker     Packs/day: 2.00     Years: 35.00     Pack years: 70.00     Types: Cigarettes     Quit date:      Years since quittin.7   • Smokeless tobacco: Never Used   Vaping Use   • Vaping Use: Never used   Substance and Sexual Activity   • Alcohol use: Yes     Alcohol/week: 5.0 standard drinks     Types: 5 Cans of beer per week   • Drug use: No   • Sexual activity: Defer       Review of Systems   Cardiovascular: Positive for dyspnea on exertion.   Neurological: Positive for dizziness.          Objective:     Vitals reviewed.   Constitutional:       General: Not in acute distress.     Appearance: Healthy appearance. Well-developed. Not diaphoretic.   Eyes:      General: No scleral icterus.     Conjunctiva/sclera: Conjunctivae normal.      Pupils: Pupils are equal, round, and reactive to light.   HENT:      Head: Normocephalic.    Mouth/Throat:      Pharynx: No oropharyngeal exudate.   Neck:      Vascular: No JVR.   Pulmonary:      Effort: Pulmonary effort is normal. No respiratory distress.      Breath sounds: Examination of the right-lower field reveals wheezing.  "Examination of the left-lower field reveals wheezing. Wheezing present. No rhonchi. No rales.   Chest:      Chest wall: Not tender to palpatation.   Cardiovascular:      Tachycardia present. Regular rhythm.   Pulses:     Intact distal pulses.   Edema:     Peripheral edema absent.   Abdominal:      General: Bowel sounds are normal. There is no distension.      Palpations: Abdomen is soft.      Tenderness: There is no abdominal tenderness.   Musculoskeletal: Normal range of motion.      Cervical back: Normal range of motion and neck supple. Skin:     General: Skin is warm and dry.      Coloration: Skin is not pale.      Findings: No erythema or rash.   Neurological:      Mental Status: Alert, oriented to person, place, and time and oriented to person, place and time.      Deep Tendon Reflexes: Reflexes are normal and symmetric.   Psychiatric:         Behavior: Behavior normal.             ECG 12 Lead    Date/Time: 9/23/2022 1:56 PM  Performed by: Alexa Martinez APRN  Authorized by: Alexa Martinez APRN   Comparison: compared with previous ECG from 8/10/2022  Comparison to previous ECG: Ventricular rate has increased by 20bpm  Rhythm: sinus tachycardia  Rate: tachycardic  BPM: 108  Conduction: conduction normal  ST Segments: ST segments normal  QRS axis: normal  Other findings: T wave abnormality    Clinical impression: abnormal EKG          /70 (BP Location: Right arm, Patient Position: Sitting)   Pulse 108   Ht 160 cm (63\")   Wt 74.4 kg (164 lb)   SpO2 96%   BMI 29.05 kg/m²     Lab Review:   I have reviewed previous office notes, recent labs and recent cardiac testing.     Results for orders placed during the hospital encounter of 08/09/22    Adult Transthoracic Echo Complete W/ Cont if Necessary Per Protocol    Interpretation Summary  · Calculated left ventricular 3D EF = 55% Estimated left ventricular EF = 45% Left ventricular systolic function is low normal.  · Left ventricular diastolic dysfunction " is noted.  · The following left ventricular wall segments are hypokinetic: apical anterior, apical lateral, apical inferior, apical septal and apex hypokinetic.      Cath 8/10/2022:  Conclusion:  Normal coronary arteries  LVEF 40% with anteroapical dyskinesis consistent with Takotsubo's myocarditis           Assessment:          Diagnosis Plan   1. Takotsubo cardiomyopathy     2. Essential hypertension     3. Stage 4 very severe COPD by GOLD classification (HCC)            Plan:       1. Takotsubo Cardiomyopathy- normal coronary arteries per cath in August with EF 40% per cath. Issues with hypotension and stopped her ACEi and BB. Recommend she push fluids and restart Toprol 12.5mg daily once her dizziness subsides.   2. HTN- controlled. Recent issues with hypotension.   3. COPD- stable.       Follow up in 3 months or sooner if symptoms worsen.     I spent 30 minutes caring for Joana on this date of service. This time includes time spent by me in the following activities:preparing for the visit, reviewing tests, obtaining and/or reviewing a separately obtained history, performing a medically appropriate examination and/or evaluation , counseling and educating the patient/family/caregiver, documenting information in the medical record, independently interpreting results and communicating that information with the patient/family/caregiver and care coordination

## 2022-09-27 ENCOUNTER — HOSPITAL ENCOUNTER (OUTPATIENT)
Dept: GENERAL RADIOLOGY | Facility: HOSPITAL | Age: 72
Discharge: HOME | End: 2022-09-27
Admitting: INTERNAL MEDICINE

## 2022-09-27 DIAGNOSIS — J96.11 CHRONIC RESPIRATORY FAILURE WITH HYPOXIA: ICD-10-CM

## 2022-09-27 DIAGNOSIS — J84.10 PULMONARY FIBROSIS: ICD-10-CM

## 2022-09-27 DIAGNOSIS — J44.9 STAGE 4 VERY SEVERE COPD BY GOLD CLASSIFICATION: ICD-10-CM

## 2022-09-27 PROCEDURE — 71046 X-RAY EXAM CHEST 2 VIEWS: CPT

## 2022-10-03 DIAGNOSIS — J44.9 STAGE 4 VERY SEVERE COPD BY GOLD CLASSIFICATION: Primary | ICD-10-CM

## 2022-10-03 RX ORDER — BUDESONIDE AND FORMOTEROL FUMARATE DIHYDRATE 160; 4.5 UG/1; UG/1
AEROSOL RESPIRATORY (INHALATION)
Qty: 10.2 G | Refills: 3 | Status: SHIPPED | OUTPATIENT
Start: 2022-10-03

## 2022-10-03 NOTE — TELEPHONE ENCOUNTER
Pharmacy sent a request for refills on Symbicort.   Rx Refill Note  Requested Prescriptions     Pending Prescriptions Disp Refills   • budesonide-formoterol (SYMBICORT) 160-4.5 MCG/ACT inhaler [Pharmacy Med Name: BUDES/FORMOT -4.5] 10.2 g 3     Sig: USE 2 INHALATIONS ORALLY   TWICE DAILY      Last office visit with prescribing clinician: 3/31/2022      Next office visit with prescribing clinician: 10/4/2022            John Mckeon CMA  10/03/22, 08:19 CDT

## 2022-10-04 ENCOUNTER — PROCEDURE VISIT (OUTPATIENT)
Dept: PULMONOLOGY | Facility: CLINIC | Age: 72
End: 2022-10-04

## 2022-10-04 ENCOUNTER — OFFICE VISIT (OUTPATIENT)
Dept: PULMONOLOGY | Facility: CLINIC | Age: 72
End: 2022-10-04

## 2022-10-04 VITALS
BODY MASS INDEX: 29.06 KG/M2 | SYSTOLIC BLOOD PRESSURE: 132 MMHG | WEIGHT: 164 LBS | DIASTOLIC BLOOD PRESSURE: 78 MMHG | HEIGHT: 63 IN | OXYGEN SATURATION: 90 % | HEART RATE: 119 BPM

## 2022-10-04 DIAGNOSIS — J44.9 STAGE 4 VERY SEVERE COPD BY GOLD CLASSIFICATION: Primary | ICD-10-CM

## 2022-10-04 DIAGNOSIS — J96.11 CHRONIC RESPIRATORY FAILURE WITH HYPOXIA: ICD-10-CM

## 2022-10-04 DIAGNOSIS — J84.10 PULMONARY FIBROSIS: ICD-10-CM

## 2022-10-04 PROCEDURE — 99213 OFFICE O/P EST LOW 20 MIN: CPT | Performed by: INTERNAL MEDICINE

## 2022-10-04 NOTE — PROGRESS NOTES
"Background:  Pt with copd , acute resp failure, focal fibrosis 2019.  hx gbs.   Chief Complaint  COPD (Pt here for F/U )    Subjective    History of Present Illness       Joana Cuevas presents to [facility].  History of Present Illness  She had some problems and workup indicated what sounds like takotsubo cardiomyopathy.  She was in the hospital at [facility] with copd exacerbation.  She continues on oxygen.  She continues on Spiriva and Symbicort.  She has had low blood pressure and lisinopril and hctz and metoprolol have been stopped.  She had Covid in July and then was hospitalized with copd exacerbation afterward.       Tobacco Use: Medium Risk   • Smoking Tobacco Use: Former Smoker   • Smokeless Tobacco Use: Never Used      Current Outpatient Medications   Medication Instructions   • acetaminophen (TYLENOL) 500 mg, Oral, Every 6 Hours PRN   • albuterol sulfate HFA (Ventolin HFA) 108 (90 Base) MCG/ACT inhaler 2 puffs, Inhalation, Every 4 Hours PRN   • atorvastatin (LIPITOR) 40 mg, Oral, Nightly   • azelastine (OPTIVAR) 0.05 % ophthalmic solution INSTILL 1 DROP INTO AFFECTED EYE TWICE A DAY   • Biotin 1,000 mcg, Oral, Daily   • budesonide-formoterol (SYMBICORT) 160-4.5 MCG/ACT inhaler USE 2 INHALATIONS ORALLY   TWICE DAILY   • cetirizine (ZYRTEC) 10 mg, Oral, Daily   • Mucus Relief  mg, Oral, Every 12 Hours Scheduled   • sertraline (ZOLOFT) 25 mg, Oral, Daily   • tiotropium (SPIRIVA) 18 MCG per inhalation capsule 1 capsule, Inhalation, Daily - RT      Objective     Vital Signs:   /78   Pulse 119   Ht 160 cm (63\")   Wt 74.4 kg (164 lb)   SpO2 90%   BMI 29.05 kg/m²   Physical Exam  Constitutional:       General: She is not in acute distress.     Appearance: She is well-developed. She is not ill-appearing or toxic-appearing.      Interventions: Nasal cannula in place.   HENT:      Head: Atraumatic.      Nose: Nose normal.   Eyes:      General: " No scleral icterus.     Conjunctiva/sclera: Conjunctivae normal.   Cardiovascular:      Rate and Rhythm: Normal rate and regular rhythm.      Heart sounds: S1 normal and S2 normal.   Pulmonary:      Effort: Pulmonary effort is normal.      Breath sounds: Rales (minimal) present. No wheezing.   Abdominal:      General: There is no distension.   Musculoskeletal:         General: No deformity.      Cervical back: Neck supple.   Skin:     Coloration: Skin is not pale.      Findings: No rash.   Neurological:      Mental Status: She is alert.        Result Review  Data Reviewed:{ Labs  Result Review  Imaging  Media :23}     XR Chest 2 View    Result Date: 9/27/2022  Impression: COPD with mild pulmonary fibrosis which appears unchanged. No superimposed acute infiltrate is identified. This report was finalized on 09/27/2022 15:17 by Dr. Driss Lopez MD.      PFT Values        Some values may be hidden. Unless noted otherwise, only the newest values recorded on each date are displayed.         Old Values PFT Results 9/23/21   No data to display.      Pre Drug PFT Results 9/23/21   FVC 68.88   FEV1 41.27   FEF 25-75% 18.12   FEV1/FVC 48      Post Drug PFT Results 9/23/21   No data to display.      Other Tests PFT Results 9/23/21   No data to display.                      Assessment and Plan  {CC Problem List  Visit Diagnosis  ROS  Review (Popup)  Health Maintenance  Quality  BestPractice  Medications  SmartSets  SnapShot Encounters  Media :23}   Diagnoses and all orders for this visit:    1. Stage 4 very severe COPD by GOLD classification (HCC) (Primary)    2. Chronic respiratory failure with hypoxia (HCC)    3. Pulmonary fibrosis (HCC)    Pulm fibrosis is unchanged radiographically  We discussed xray findings and cath results.  Will follow up spirometry in the spring  She has had pneumovax, consider prevnar 20, although she has concerns with hx guillain barre syndrome.    Follow Up {Instructions Charge  Capture  Follow-up Communications :23}   Return in about 6 months (around 4/4/2023) for spirometry.  Patient was given instructions and counseling regarding her condition or for health maintenance advice. Please see specific information pulled into the AVS if appropriate.    Electronically signed by Ar Kendrick MD, 10/4/2022, 15:11 CDT

## 2023-01-09 ENCOUNTER — OFFICE VISIT (OUTPATIENT)
Dept: CARDIOLOGY | Facility: CLINIC | Age: 73
End: 2023-01-09
Payer: MEDICARE

## 2023-01-09 VITALS
WEIGHT: 159 LBS | DIASTOLIC BLOOD PRESSURE: 76 MMHG | HEART RATE: 100 BPM | BODY MASS INDEX: 28.17 KG/M2 | HEIGHT: 63 IN | SYSTOLIC BLOOD PRESSURE: 136 MMHG | OXYGEN SATURATION: 95 %

## 2023-01-09 DIAGNOSIS — I10 ESSENTIAL HYPERTENSION: ICD-10-CM

## 2023-01-09 DIAGNOSIS — R00.2 PALPITATIONS: ICD-10-CM

## 2023-01-09 DIAGNOSIS — J44.9 STAGE 4 VERY SEVERE COPD BY GOLD CLASSIFICATION: ICD-10-CM

## 2023-01-09 DIAGNOSIS — I51.81 TAKOTSUBO CARDIOMYOPATHY: Primary | ICD-10-CM

## 2023-01-09 PROCEDURE — 99214 OFFICE O/P EST MOD 30 MIN: CPT | Performed by: NURSE PRACTITIONER

## 2023-01-09 PROCEDURE — 93000 ELECTROCARDIOGRAM COMPLETE: CPT | Performed by: NURSE PRACTITIONER

## 2023-01-09 RX ORDER — BACLOFEN 5 MG/1
TABLET ORAL
COMMUNITY
Start: 2022-11-17

## 2023-01-09 NOTE — PROGRESS NOTES
Subjective:     Encounter Date:01/09/2023      Patient ID: Joana Cuevas is a 72 y.o. female     Chief Complaint:\"I've been so-so\"  Cardiomyopathy  This is a chronic problem. The current episode started more than 1 month ago. The problem occurs daily. The problem has been unchanged. Pertinent negatives include no chest pain, coughing, rash or weakness.   Shortness of Breath  This is a chronic problem. The current episode started more than 1 year ago. The problem occurs daily. The problem has been waxing and waning. Pertinent negatives include no chest pain, leg swelling, orthopnea, PND, rash, sputum production, syncope or wheezing.     Patient presents today for a follow up. She was seen as a hospital consult in 8/2021 for an elevated troponin. She had a 2D echo that revealed 40% therefore she underwent a LHC that revealed normal coronary arteries with presence of Takostubo cardiomyopathy. She was started on Lisinopril and Toprol due to reduced EF. At her last appointment she was noted to have issues with hypotension, therefore she stopped her lisinopril and Toprol.     Today she feels \"so, so\" and notes she has had a few episodes of palpitations since her last visit with longest episode lasting 15 minutes with HR varying 120-140. She notes her symptoms usually occur upon waking up in the morning. She does utilize her albuterol inhaler in the morning, and notes her palpitations occasionally start prior to use of albuterol. She notes associated short of breath with her palpitations. Her palpitations typically subside after she sits down and rests. She notes her last episode was 2 weeks ago and \"comes in spurts\". She continues to have occasional hypotension. She attempted to restart her Toprol as previously discussed however developed symptomatic hypotension after 2-3 doses. Her chronic CRISTOBAL remains unchanged. She remains on 3L O2 with continuous use. She denies chest pain and edema.     The following portions  of the patient's history were reviewed and updated as appropriate: allergies, current medications, past family history, past medical history, past social history, past surgical history and problem list.    No Known Allergies    Current Outpatient Medications:   •  acetaminophen (TYLENOL) 500 MG tablet, Take 500 mg by mouth Every 6 (Six) Hours As Needed for Mild Pain ., Disp: , Rfl:   •  albuterol sulfate HFA (Ventolin HFA) 108 (90 Base) MCG/ACT inhaler, Inhale 2 puffs Every 4 (Four) Hours As Needed for Wheezing., Disp: 54 g, Rfl: 3  •  atorvastatin (LIPITOR) 40 MG tablet, Take 1 tablet by mouth Every Night., Disp: 90 tablet, Rfl: 3  •  azelastine (OPTIVAR) 0.05 % ophthalmic solution, INSTILL 1 DROP INTO AFFECTED EYE TWICE A DAY, Disp: , Rfl:   •  Baclofen 5 MG tablet, , Disp: , Rfl:   •  Biotin 76728 MCG tablet, Take 1,000 mcg by mouth Daily., Disp: , Rfl:   •  budesonide-formoterol (SYMBICORT) 160-4.5 MCG/ACT inhaler, USE 2 INHALATIONS ORALLY   TWICE DAILY, Disp: 10.2 g, Rfl: 3  •  cetirizine (zyrTEC) 10 MG tablet, Take 10 mg by mouth Daily., Disp: , Rfl:   •  guaiFENesin (MUCINEX) 600 MG 12 hr tablet, Take 1 tablet by mouth Every 12 (Twelve) Hours., Disp: 20 tablet, Rfl: 0  •  sertraline (ZOLOFT) 25 MG tablet, Take 25 mg by mouth Daily., Disp: , Rfl:   •  tiotropium (SPIRIVA) 18 MCG per inhalation capsule, Place 1 capsule into inhaler and inhale Daily., Disp: , Rfl:   Past Medical History:   Diagnosis Date   • Breast cancer (HCC)    • Cancer (HCC)    • Chronic bronchitis (HCC)    • Guillain Barré syndrome (HCC) 2016   • Laceration of head 2019   • Pneumonia    • Syncope 2019       Social History     Socioeconomic History   • Marital status:    Tobacco Use   • Smoking status: Former     Packs/day: 2.00     Years: 35.00     Pack years: 70.00     Types: Cigarettes     Quit date:      Years since quittin.0     Passive exposure: Current   • Smokeless tobacco: Never   Vaping Use   •  Vaping Use: Never used   Substance and Sexual Activity   • Alcohol use: Yes     Alcohol/week: 5.0 standard drinks     Types: 5 Cans of beer per week   • Drug use: No   • Sexual activity: Defer       Review of Systems   Constitutional: Negative for malaise/fatigue, weight gain and weight loss.   Cardiovascular: Positive for dyspnea on exertion and palpitations. Negative for chest pain, irregular heartbeat, leg swelling, near-syncope, orthopnea, paroxysmal nocturnal dyspnea and syncope.   Respiratory: Positive for shortness of breath. Negative for cough, sleep disturbances due to breathing, sputum production and wheezing.    Skin: Negative for dry skin, flushing, itching and rash.   Gastrointestinal: Negative for hematemesis and hematochezia.   Neurological: Positive for dizziness. Negative for light-headedness, loss of balance and weakness.   All other systems reviewed and are negative.         Objective:     Vitals reviewed.   Constitutional:       General: Not in acute distress.     Appearance: Healthy appearance. Well-developed. Not diaphoretic.   Eyes:      General: No scleral icterus.     Conjunctiva/sclera: Conjunctivae normal.      Pupils: Pupils are equal, round, and reactive to light.   HENT:      Head: Normocephalic.    Mouth/Throat:      Pharynx: No oropharyngeal exudate.   Neck:      Vascular: No JVR.   Pulmonary:      Effort: Pulmonary effort is normal. No respiratory distress.      Breath sounds: No wheezing. No rhonchi. No rales.   Chest:      Chest wall: Not tender to palpatation.   Cardiovascular:      Tachycardia present. Regular rhythm.   Pulses:     Intact distal pulses.   Edema:     Peripheral edema absent.   Abdominal:      General: Bowel sounds are normal. There is no distension.      Palpations: Abdomen is soft.      Tenderness: There is no abdominal tenderness.   Musculoskeletal: Normal range of motion.      Cervical back: Normal range of motion and neck supple. Skin:     General: Skin is  warm and dry.      Coloration: Skin is not pale.      Findings: No erythema or rash.   Neurological:      Mental Status: Alert, oriented to person, place, and time and oriented to person, place and time.      Deep Tendon Reflexes: Reflexes are normal and symmetric.   Psychiatric:         Behavior: Behavior normal.             ECG 12 Lead    Date/Time: 1/9/2023 3:28 PM  Performed by: Alexa Martinez APRN  Authorized by: Alexa Martinez APRN   Comparison: compared with previous ECG from 9/23/2022  Similar to previous ECG  Rhythm: sinus tachycardia  Rate: tachycardic  BPM: 100  Conduction: conduction normal  ST Segments: ST segments normal  T Waves: T waves normal  QRS axis: left  Other: no other findings    Clinical impression: abnormal EKG          /76 (BP Location: Left arm, Patient Position: Sitting, Cuff Size: Adult)   Pulse 100   Ht 160 cm (63\")   Wt 72.1 kg (159 lb)   SpO2 95%   BMI 28.17 kg/m²     Lab Review:   I have reviewed previous office notes, recent labs and recent cardiac testing.     Results for orders placed during the hospital encounter of 08/09/22    Adult Transthoracic Echo Complete W/ Cont if Necessary Per Protocol    Interpretation Summary  · Calculated left ventricular 3D EF = 55% Estimated left ventricular EF = 45% Left ventricular systolic function is low normal.  · Left ventricular diastolic dysfunction is noted.  · The following left ventricular wall segments are hypokinetic: apical anterior, apical lateral, apical inferior, apical septal and apex hypokinetic.      Cath 8/10/2022:  Conclusion:  Normal coronary arteries  LVEF 40% with anteroapical dyskinesis consistent with Takotsubo's myocarditis           Assessment:          Diagnosis Plan   1. Takotsubo cardiomyopathy  Adult Transthoracic Echo Complete w/ Color, Spectral and Contrast if necessary per protocol      2. Essential hypertension        3. Stage 4 very severe COPD by GOLD classification (HCC)        4. Palpitations                Plan:       1. Takotsubo Cardiomyopathy- normal coronary arteries per cath in August with EF 40% per cath. Issues with hypotension and stopped her ACEi and BB. Will recheck 2D echo to evaluate EF.   2. HTN- controlled. Recent issues with hypotension.   3. COPD- stable. On 3L NC continuous.   4. Palpitations- unable to tolerate BB due to hypotension. If palpitations become more frequent, will place in a holter monitor.       Follow up in 6 months or sooner if symptoms worsen.     I spent 30 minutes caring for Joana on this date of service. This time includes time spent by me in the following activities:preparing for the visit, reviewing tests, obtaining and/or reviewing a separately obtained history, performing a medically appropriate examination and/or evaluation , counseling and educating the patient/family/caregiver, documenting information in the medical record, independently interpreting results and communicating that information with the patient/family/caregiver and care coordination     I spent 2 minutes on the separately reported service of EKG interpretation. This time is not included in the time used to support the E/M service also reported today.

## 2023-02-06 ENCOUNTER — HOSPITAL ENCOUNTER (OUTPATIENT)
Dept: CARDIOLOGY | Facility: HOSPITAL | Age: 73
Discharge: HOME | End: 2023-02-06
Admitting: NURSE PRACTITIONER
Payer: MEDICARE

## 2023-02-06 VITALS
BODY MASS INDEX: 28.17 KG/M2 | SYSTOLIC BLOOD PRESSURE: 136 MMHG | WEIGHT: 159 LBS | DIASTOLIC BLOOD PRESSURE: 76 MMHG | HEIGHT: 63 IN

## 2023-02-06 DIAGNOSIS — I51.81 TAKOTSUBO CARDIOMYOPATHY: ICD-10-CM

## 2023-02-06 PROCEDURE — 25010000002 PERFLUTREN 6.52 MG/ML SUSPENSION: Performed by: INTERNAL MEDICINE

## 2023-02-06 PROCEDURE — 93306 TTE W/DOPPLER COMPLETE: CPT | Performed by: INTERNAL MEDICINE

## 2023-02-06 PROCEDURE — 93306 TTE W/DOPPLER COMPLETE: CPT

## 2023-02-06 RX ADMIN — PERFLUTREN 13.04 MG: 6.52 INJECTION, SUSPENSION INTRAVENOUS at 16:11

## 2023-02-08 DIAGNOSIS — J44.9 STAGE 4 VERY SEVERE COPD BY GOLD CLASSIFICATION: Primary | ICD-10-CM

## 2023-02-08 RX ORDER — ALBUTEROL SULFATE 90 UG/1
2 AEROSOL, METERED RESPIRATORY (INHALATION) EVERY 4 HOURS PRN
Qty: 54 G | Refills: 3 | Status: SHIPPED | OUTPATIENT
Start: 2023-02-08 | End: 2023-05-09

## 2023-02-08 NOTE — TELEPHONE ENCOUNTER
Patient called to request refills on Albuterol HFA. She states on her last prescription she only got #2 with 3 refills. Request passed protocol and sent to [facility].  Rx Refill Note  Requested Prescriptions     Pending Prescriptions Disp Refills   • albuterol sulfate HFA (Ventolin HFA) 108 (90 Base) MCG/ACT inhaler 54 g 3     Sig: Inhale 2 puffs Every 4 (Four) Hours As Needed for Wheezing for up to 90 days.      Last office visit with prescribing clinician: 10/4/2022   Last telemedicine visit with prescribing clinician: 4/4/2023   Next office visit with prescribing clinician: 4/4/2023                         Would you like a call back once the refill request has been completed: [] Yes [] No    If the office needs to give you a call back, can they leave a voicemail: [] Yes [] No    John Mckeon, [facility]  02/08/23, 16:05 CST

## 2023-02-09 ENCOUNTER — TELEPHONE (OUTPATIENT)
Dept: CARDIOLOGY | Facility: CLINIC | Age: 73
End: 2023-02-09
Payer: MEDICARE

## 2023-02-09 LAB
BH CV ECHO MEAS - AO MAX PG: 10.8 MMHG
BH CV ECHO MEAS - AO MEAN PG: 4.4 MMHG
BH CV ECHO MEAS - AO ROOT DIAM: 3 CM
BH CV ECHO MEAS - AO V2 MAX: 164 CM/SEC
BH CV ECHO MEAS - AO V2 VTI: 24.4 CM
BH CV ECHO MEAS - AVA(I,D): 2.44 CM2
BH CV ECHO MEAS - EDV(CUBED): 103.8 ML
BH CV ECHO MEAS - EDV(MOD-SP2): 109 ML
BH CV ECHO MEAS - EDV(MOD-SP4): 107 ML
BH CV ECHO MEAS - EF(MOD-BP): 51.4 %
BH CV ECHO MEAS - EF(MOD-SP2): 54 %
BH CV ECHO MEAS - EF(MOD-SP4): 50.4 %
BH CV ECHO MEAS - ESV(CUBED): 29.8 ML
BH CV ECHO MEAS - ESV(MOD-SP2): 50.1 ML
BH CV ECHO MEAS - ESV(MOD-SP4): 53.1 ML
BH CV ECHO MEAS - FS: 34 %
BH CV ECHO MEAS - IVS/LVPW: 0.9 CM
BH CV ECHO MEAS - IVSD: 0.9 CM
BH CV ECHO MEAS - LA DIMENSION: 3.3 CM
BH CV ECHO MEAS - LAT PEAK E' VEL: 7.8 CM/SEC
BH CV ECHO MEAS - LV DIASTOLIC VOL/BSA (35-75): 61 CM2
BH CV ECHO MEAS - LV MASS(C)D: 153.4 GRAMS
BH CV ECHO MEAS - LV MAX PG: 5.9 MMHG
BH CV ECHO MEAS - LV MEAN PG: 3 MMHG
BH CV ECHO MEAS - LV SYSTOLIC VOL/BSA (12-30): 30.3 CM2
BH CV ECHO MEAS - LV V1 MAX: 121 CM/SEC
BH CV ECHO MEAS - LV V1 VTI: 19 CM
BH CV ECHO MEAS - LVIDD: 4.7 CM
BH CV ECHO MEAS - LVIDS: 3.1 CM
BH CV ECHO MEAS - LVOT AREA: 3.1 CM2
BH CV ECHO MEAS - LVOT DIAM: 2 CM
BH CV ECHO MEAS - LVPWD: 1 CM
BH CV ECHO MEAS - MED PEAK E' VEL: 5.4 CM/SEC
BH CV ECHO MEAS - MR MAX PG: 14.3 MMHG
BH CV ECHO MEAS - MR MAX VEL: 189 CM/SEC
BH CV ECHO MEAS - MV A MAX VEL: 93.5 CM/SEC
BH CV ECHO MEAS - MV DEC TIME: 0.11 MSEC
BH CV ECHO MEAS - MV E MAX VEL: 47.2 CM/SEC
BH CV ECHO MEAS - MV E/A: 0.5
BH CV ECHO MEAS - MV MAX PG: 4.5 MMHG
BH CV ECHO MEAS - MV MEAN PG: 2 MMHG
BH CV ECHO MEAS - MV V2 VTI: 13.2 CM
BH CV ECHO MEAS - MVA(VTI): 4.5 CM2
BH CV ECHO MEAS - PA V2 MAX: 85.1 CM/SEC
BH CV ECHO MEAS - SI(MOD-SP2): 33.6 ML/M2
BH CV ECHO MEAS - SI(MOD-SP4): 30.7 ML/M2
BH CV ECHO MEAS - SV(LVOT): 59.7 ML
BH CV ECHO MEAS - SV(MOD-SP2): 58.9 ML
BH CV ECHO MEAS - SV(MOD-SP4): 53.9 ML
BH CV ECHO MEAS - TAPSE (>1.6): 1.89 CM
BH CV ECHO MEAS - TR MAX PG: 16.6 MMHG
BH CV ECHO MEAS - TR MAX VEL: 204 CM/SEC
BH CV ECHO MEASUREMENTS AVERAGE E/E' RATIO: 7.15
BH CV VAS BP LEFT ARM: NORMAL MMHG
BH CV XLRA - TDI S': 9.6 CM/SEC
LEFT ATRIUM VOLUME INDEX: 17.7 ML/M2
LV EF 2D ECHO EST: 55 %
MAXIMAL PREDICTED HEART RATE: 150 BPM
STRESS TARGET HR: 128 BPM

## 2023-02-09 NOTE — TELEPHONE ENCOUNTER
----- Message from JOHANNE Ramires sent at 2/9/2023 10:17 AM CST -----  Please let patient know her EF has returned to normal. The right side of her heart is borderline dilated when compared to her previous echo but this is due to her lung disease. There is nothing we can do about that.

## 2023-02-10 NOTE — TELEPHONE ENCOUNTER
Alexa Martinez APRN [place], [place], [place]  Really nothing. It increases her risk for heart failure but other than that nothing. She just needs to keep doing what pulmonology tells her to do.           Previous Messages       ----- Message -----   From: Obdulia Cleveland MA   Sent: 2/9/2023   3:50 PM CST   To: JOHANNE Ramires     Patient would like to know what to expect with the right side of heart being borderline dilated due to her COPD.  Please advise.  Obdulia Cleveland MA

## 2023-02-10 NOTE — TELEPHONE ENCOUNTER
Patient given response to her question and voiced understanding.  She requested a copy of the echo sent to Dr. Lennon.  This was completed 2/10/2023.  Obdulia Cleveland MA

## 2023-06-19 RX ORDER — TIOTROPIUM BROMIDE 18 UG/1
CAPSULE ORAL; RESPIRATORY (INHALATION)
Qty: 90 CAPSULE | Refills: 3 | Status: SHIPPED | OUTPATIENT
Start: 2023-06-19

## 2023-06-19 NOTE — TELEPHONE ENCOUNTER
Rx Refill Note  Requested Prescriptions     Pending Prescriptions Disp Refills    Spiriva HandiHaler 18 MCG per inhalation capsule [Pharmacy Med Name: SPIRIVA HHLR CAP 18MCG] 90 capsule 3     Sig: INHALE THE CONTENTS OF ONE CAPSULE DAILY      Last office visit with prescribing clinician: 10/4/2022   Last telemedicine visit with prescribing clinician: Visit date not found   Next office visit with prescribing clinician: 8/10/2023                         Would you like a call back once the refill request has been completed: [] Yes [] No    If the office needs to give you a call back, can they leave a voicemail: [] Yes [] No    [place] Rosa, CHANDRAKANT  06/19/23, 10:25 CDT

## 2023-08-03 NOTE — OUTREACH NOTE
Prep Survey    Flowsheet Row Responses   [facility] patient discharged from? [place]   Is LACE score < 7 ? No   Emergency Room discharge w/ pulse ox? No   Eligibility Readm Mgmt   Discharge diagnosis Acute respiratory failure with hypercapnia, Elevated troponin due to Takotsubo myocarditis and COPD exacerbation   Does the patient have one of the following disease processes/diagnoses(primary or secondary)? COPD/Pneumonia   Does the patient have Home health ordered? No   Is there a DME ordered? No   Prep survey completed? Yes          PREM ORCKWELL - Registered Nurse         Unknown if ever smoked

## 2023-08-10 ENCOUNTER — OFFICE VISIT (OUTPATIENT)
Dept: PULMONOLOGY | Facility: CLINIC | Age: 73
End: 2023-08-10
Payer: MEDICARE

## 2023-08-10 VITALS
BODY MASS INDEX: 27.21 KG/M2 | DIASTOLIC BLOOD PRESSURE: 78 MMHG | OXYGEN SATURATION: 90 % | WEIGHT: 159.4 LBS | SYSTOLIC BLOOD PRESSURE: 132 MMHG | HEIGHT: 64 IN | HEART RATE: 100 BPM

## 2023-08-10 DIAGNOSIS — J84.10 PULMONARY FIBROSIS: ICD-10-CM

## 2023-08-10 DIAGNOSIS — J96.11 CHRONIC RESPIRATORY FAILURE WITH HYPOXIA: ICD-10-CM

## 2023-08-10 DIAGNOSIS — J44.9 STAGE 4 VERY SEVERE COPD BY GOLD CLASSIFICATION: Primary | ICD-10-CM

## 2023-08-10 PROCEDURE — 99214 OFFICE O/P EST MOD 30 MIN: CPT | Performed by: INTERNAL MEDICINE

## 2023-08-10 PROCEDURE — 3075F SYST BP GE 130 - 139MM HG: CPT | Performed by: INTERNAL MEDICINE

## 2023-08-10 PROCEDURE — 3078F DIAST BP <80 MM HG: CPT | Performed by: INTERNAL MEDICINE

## 2023-08-10 RX ORDER — CHLORPHENIRAMINE MALEATE 4 MG/1
4 TABLET ORAL EVERY 6 HOURS PRN
COMMUNITY

## 2023-08-10 NOTE — PROGRESS NOTES
"Background:  Pt with copd , acute resp failure, focal fibrosis 2019.  hx gbs.   Chief Complaint  COPD    Subjective    History of Present Illness     Joana Cuevas is here for follow up with [facility] PULMONARY & CRITICAL CARE MEDICINE.  History of Present Illness  She has had heart palpitations last year, and more recently some abnormal findings with right heart on echo.  She is using oxygen around the clock.  She is on triple therapy with Spiriva, albuterol, Symbicort.    Tobacco Use: Medium Risk    Smoking Tobacco Use: Former    Smokeless Tobacco Use: Never    Passive Exposure: Current      Current Outpatient Medications   Medication Instructions    acetaminophen (TYLENOL) 500 mg, Oral, Every 6 Hours PRN    albuterol sulfate  (90 Base) MCG/ACT inhaler     atorvastatin (LIPITOR) 40 mg, Oral, Nightly    azelastine (OPTIVAR) 0.05 % ophthalmic solution INSTILL 1 DROP INTO AFFECTED EYE TWICE A DAY    Baclofen 5 MG tablet No dose, route, or frequency recorded.    Biotin 05620 MCG tablet biotin   one daily 10,000mcg    budesonide-formoterol (SYMBICORT) 160-4.5 MCG/ACT inhaler USE 2 INHALATIONS ORALLY   TWICE DAILY    cetirizine (ZYRTEC) 10 mg, Oral, Daily    chlorpheniramine (CHLOR-TRIMETON) 4 mg, Oral, Every 6 Hours PRN    ELDERBERRY PO Oral    Mucus Relief  mg, Oral, Every 12 Hours Scheduled    O2 (OXYGEN) oxygen   3 lpm    sertraline (ZOLOFT) 25 mg, Oral, Daily    Spiriva HandiHaler 18 MCG per inhalation capsule INHALE THE CONTENTS OF ONE CAPSULE DAILY      Objective     Vital Signs:   /78   Pulse 100   Ht 161.3 cm (63.5\")   Wt 72.3 kg (159 lb 6.4 oz)   SpO2 90% Comment: 3L  BMI 27.79 kg/mý   Physical Exam  Constitutional:       General: She is not in acute distress.     Appearance: She is well-developed. She is not ill-appearing or toxic-appearing.   HENT:      Head: Atraumatic.   Eyes:      General: No scleral icterus.     Conjunctiva/sclera: Conjunctivae normal. "   Cardiovascular:      Rate and Rhythm: Normal rate and regular rhythm.      Heart sounds: S1 normal and S2 normal.   Pulmonary:      Effort: Pulmonary effort is normal.      Breath sounds: Rales (few) present.   Abdominal:      General: There is no distension.   Musculoskeletal:         General: No deformity.      Cervical back: Neck supple.   Skin:     Coloration: Skin is not pale.      Findings: No rash.   Neurological:      Mental Status: She is alert.      Result Review  Data Reviewed:  Echo 2/2023    Left ventricular systolic function is normal. Estimated left ventricular EF = 55%    Left ventricular diastolic function is consistent with (grade I) impaired relaxation.    The right ventricular cavity is borderline dilated.    The right atrial cavity is mildly  dilated.    There is a trivial pericardial effusion.   XR Chest 1 View (08/09/2022 12:47)   Stable chronic lung changes and evidence of COPD with  interstitial fibrosis. No superimposed acute infiltrates are identified.    PFT Values          9/23/2021    14:30   Pre Drug PFT Results   FVC 68.88   FEV1 41.27   FEF 25-75% 18.12   FEV1/FVC 48                Assessment and Plan      Diagnoses and all orders for this visit:    1. Stage 4 very severe COPD by GOLD classification (Primary)  -     Spirometry    2. Chronic respiratory failure with hypoxia    3. Pulmonary fibrosis    She is overall pretty stable but with significant impairment, probably group 3 pulm htn, resp failure  Continue oxygen around the clock for chronic resp failure related to copd and pulm fibrosis  Continue inhalers  Consider rsv vaccine although there is some theoretical risk given hx of gbs previously (following pneumonia, not vaccine, and she remains at risk for postviral gbs should she contract rsv)  Follow Up     Return in about 4 months (around 12/10/2023) for Spirometry and DLCO.  Patient was given instructions and counseling regarding her condition or for health maintenance  advice. Please see specific information pulled into the AVS if appropriate.    Electronically signed by Ar Kendrick MD, 8/10/2023, 16:08 CDT

## 2023-09-22 DIAGNOSIS — J44.9 STAGE 4 VERY SEVERE COPD BY GOLD CLASSIFICATION: ICD-10-CM

## 2023-09-22 RX ORDER — BUDESONIDE AND FORMOTEROL FUMARATE DIHYDRATE 160; 4.5 UG/1; UG/1
AEROSOL RESPIRATORY (INHALATION)
Qty: 30.6 G | Refills: 3 | Status: SHIPPED | OUTPATIENT
Start: 2023-09-22

## 2023-12-27 ENCOUNTER — OFFICE VISIT (OUTPATIENT)
Dept: PULMONOLOGY | Facility: CLINIC | Age: 73
End: 2023-12-27
Payer: MEDICARE

## 2023-12-27 ENCOUNTER — PROCEDURE VISIT (OUTPATIENT)
Dept: PULMONOLOGY | Facility: CLINIC | Age: 73
End: 2023-12-27
Payer: MEDICARE

## 2023-12-27 VITALS
DIASTOLIC BLOOD PRESSURE: 82 MMHG | HEIGHT: 63 IN | SYSTOLIC BLOOD PRESSURE: 136 MMHG | BODY MASS INDEX: 28.17 KG/M2 | WEIGHT: 159 LBS | HEART RATE: 117 BPM | OXYGEN SATURATION: 90 %

## 2023-12-27 DIAGNOSIS — J96.11 CHRONIC RESPIRATORY FAILURE WITH HYPOXIA: ICD-10-CM

## 2023-12-27 DIAGNOSIS — J44.9 STAGE 4 VERY SEVERE COPD BY GOLD CLASSIFICATION: Primary | ICD-10-CM

## 2023-12-27 NOTE — PROCEDURES
Spirometry with Diffusion Capacity    Performed by: Ar Kendrick MD  Authorized by: Ar Kendrick MD     Pre Drug % Predicted    FVC: 59%   FEV1: 29%   FEF 25-75%: 12%   FEV1/FVC: 39%   DLCO: 40%   D/VAsb: 59%    Interpretation   Spirometry   Spirometry shows very severe obstruction. There is reduced midflow suggesting small airway/airflow obstruction.   Review of FVL curve   Patient's effort is normal.   Diffusion Capacity  The patient's diffusion capacity is moderately reduced.  Diffusion capacity is mildly reduced when corrected for alveolar volume.   Electronically signed by Ar Kendrick MD, 12/28/2023, 14:00 CST

## 2023-12-27 NOTE — PROGRESS NOTES
"Background:  Pt with copd , acute resp failure, focal fibrosis 2019.  hx gbs.   Chief Complaint  Stage 4 very severe COPD (O2 PD 3 L 86% exercise; 96% rest)    Subjective    History of Present Illness     Joana Cuevas is here for follow up with [facility] PULMONARY & CRITICAL CARE MEDICINE.  History of Present Illness  She follows up for very severe copd on triple therapy and chronic home oxygen.  She has hx gbs and is poor candidate for vaccines.  She continues on albuterol prn, has some sinus congestion. She has tried multiple sinus medicines.  She continues with oxygen and is doing well with that.     Tobacco Use: Medium Risk (12/27/2023)    Patient History     Smoking Tobacco Use: Former     Smokeless Tobacco Use: Never     Passive Exposure: Current      Current Outpatient Medications   Medication Instructions    acetaminophen (TYLENOL) 500 mg, Oral, Every 6 Hours PRN    albuterol sulfate  (90 Base) MCG/ACT inhaler     atorvastatin (LIPITOR) 40 mg, Oral, Nightly    azelastine (OPTIVAR) 0.05 % ophthalmic solution INSTILL 1 DROP INTO AFFECTED EYE TWICE A DAY    Baclofen 5 MG tablet No dose, route, or frequency recorded.    Biotin 97607 MCG tablet biotin   one daily 10,000mcg    cetirizine (ZYRTEC) 10 mg, Oral, Daily    chlorpheniramine (CHLOR-TRIMETON) 4 mg, Oral, Every 6 Hours PRN    ELDERBERRY PO Oral    Mucus Relief  mg, Oral, Every 12 Hours Scheduled    O2 (OXYGEN) oxygen   3 lpm    sertraline (ZOLOFT) 25 mg, Oral, Daily    Spiriva HandiHaler 18 MCG per inhalation capsule INHALE THE CONTENTS OF ONE CAPSULE DAILY    Symbicort 160-4.5 MCG/ACT inhaler USE 2 INHALATIONS ORALLY   TWICE DAILY      Objective     Vital Signs:   /82   Pulse 117   Ht 160 cm (63\")   Wt 72.1 kg (159 lb)   SpO2 90% Comment: 3 L PD  BMI 28.17 kg/m²   Physical Exam  Constitutional:       General: She is awake. She is not in acute distress.     Appearance: She is well-developed. She is not " ill-appearing or toxic-appearing.      Interventions: Nasal cannula in place.   HENT:      Head: Atraumatic.   Eyes:      General: No scleral icterus.     Conjunctiva/sclera: Conjunctivae normal.   Cardiovascular:      Rate and Rhythm: Normal rate and regular rhythm.      Heart sounds: S1 normal and S2 normal.   Pulmonary:      Effort: Pulmonary effort is normal.      Breath sounds: Normal breath sounds. No wheezing or rales.   Abdominal:      General: There is no distension.   Musculoskeletal:         General: No deformity.      Cervical back: Neck supple.   Neurological:      Mental Status: She is alert.        Result Review  Data Reviewed:         PFT Values          12/27/2023    11:30   Pre Drug PFT Results   FVC 59   FEV1 29   FEF 25-75% 12   FEV1/FVC 39   Other Tests PFT Results   DLCO 40   D/VAsb 59                Assessment and Plan    Diagnoses and all orders for this visit:    1. Stage 4 very severe COPD by GOLD classification (Primary)  -     Spirometry with Diffusion Capacity    2. Chronic respiratory failure with hypoxia    Recommend pulm rehab after flu season subsides  Unfortunately not a candidate for rsv vaccine  Could be candidate for endobronchial valves. We discussed this.  This would require new PFT with lung volume measurements, ct chest with valve protocol and analysis, and pulmonary rehab.  Continue inhalers for copd with no changes  Continue oxygen, compliant and benefiting.    Follow Up   Return in about 3 months (around 3/27/2024).  Patient was given instructions and counseling regarding her condition or for health maintenance advice. Please see specific information pulled into the AVS if appropriate.    Electronically signed by Ar Kendrick MD, 12/27/2023, 20:50 CST

## 2024-02-05 ENCOUNTER — TRANSCRIBE ORDERS (OUTPATIENT)
Dept: ADMINISTRATIVE | Facility: HOSPITAL | Age: 74
End: 2024-02-05
Payer: MEDICARE

## 2024-02-05 DIAGNOSIS — Z78.0 ASYMPTOMATIC MENOPAUSAL STATE: Primary | ICD-10-CM

## 2024-02-16 ENCOUNTER — HOSPITAL ENCOUNTER (OUTPATIENT)
Dept: BONE DENSITY | Facility: HOSPITAL | Age: 74
Discharge: HOME | End: 2024-02-16
Payer: MEDICARE

## 2024-02-16 DIAGNOSIS — Z78.0 ASYMPTOMATIC MENOPAUSAL STATE: ICD-10-CM

## 2024-02-16 PROCEDURE — 77080 DXA BONE DENSITY AXIAL: CPT

## 2024-03-26 NOTE — PROGRESS NOTES
Chief Complaint  COPD    Subjective    History of Present Illness {CC  Problem List  Visit Diagnosis   Encounters  Notes  Medications  Labs  Result Review Imaging  Media     Joana Cuevas presents to [facility] PULMONARY & CRITICAL CARE MEDICINE for:    History of Present Illness  Ms. Cuevas is here for follow up and management of stage 4 copd. She continues on Symbicort and Spiriva. She continues on supplemental oxygen at 3l continuously for which she is complaint and benefiting. Inhalers have become very expensive. Insurance prefers Wixella now.  Her  is on dialysis now and she has been very busy with appointments.        Prior to Admission medications    Medication Sig Start Date End Date Taking? Authorizing Provider   acetaminophen (TYLENOL) 500 MG tablet Take 1 tablet by mouth Every 6 (Six) Hours As Needed for Mild Pain.    Mike Davidson MD   albuterol sulfate  (90 Base) MCG/ACT inhaler  6/26/23   Mike Davidson MD   atorvastatin (LIPITOR) 40 MG tablet Take 1 tablet by mouth Every Night. 9/23/22   Alexa Martinez APRN   azelastine (OPTIVAR) 0.05 % ophthalmic solution INSTILL 1 DROP INTO AFFECTED EYE TWICE A DAY 9/10/22   Mike Davidson MD   Baclofen 5 MG tablet  11/17/22   Mike Davidson MD   Biotin 01770 MCG tablet biotin   one daily 10,000mcg    Mike Davidson MD   cetirizine (zyrTEC) 10 MG tablet Take 1 tablet by mouth Daily.    Mike Davidson MD   chlorpheniramine (CHLOR-TRIMETON) 4 MG tablet Take 1 tablet by mouth Every 6 (Six) Hours As Needed for Allergies.    Mike Davidson MD   ELDERBERRY PO Take  by mouth.    Mike Davidson MD   guaiFENesin (MUCINEX) 600 MG 12 hr tablet Take 1 tablet by mouth Every 12 (Twelve) Hours. 8/12/22   Danii Mensah APRN   O2 (OXYGEN) oxygen   3 lpm    Mike Davidson MD   sertraline (ZOLOFT) 25 MG tablet Take 1 tablet by mouth Daily.    Mike Davidson MD  "  Spiriva HandiHaler 18 MCG per inhalation capsule INHALE THE CONTENTS OF ONE CAPSULE DAILY 23   Ar Kendrick MD   Symbicort 160-4.5 MCG/ACT inhaler USE 2 INHALATIONS ORALLY   TWICE DAILY 23   Ar Kendrick MD       Social History     Socioeconomic History    Marital status:    Tobacco Use    Smoking status: Former     Current packs/day: 0.00     Average packs/day: 2.0 packs/day for 35.0 years (70.0 ttl pk-yrs)     Types: Cigarettes     Start date:      Quit date:      Years since quittin.2     Passive exposure: Current    Smokeless tobacco: Never   Vaping Use    Vaping status: Never Used   Substance and Sexual Activity    Alcohol use: Yes     Alcohol/week: 5.0 standard drinks of alcohol     Types: 5 Cans of beer per week    Drug use: No    Sexual activity: Defer       Objective   Vital Signs:   /68   Pulse 99   Ht 160 cm (63\")   Wt 72.1 kg (159 lb)   SpO2 92% Comment: 3L  BMI 28.17 kg/m²     Physical Exam  Constitutional:       General: She is not in acute distress.     Interventions: Nasal cannula in place.      Comments: 3l   HENT:      Head: Normocephalic.      Nose: Nose normal.      Mouth/Throat:      Mouth: Mucous membranes are moist.   Eyes:      General: No scleral icterus.  Cardiovascular:      Rate and Rhythm: Normal rate.   Pulmonary:      Effort: No respiratory distress.      Breath sounds: Decreased breath sounds present.   Abdominal:      General: There is no distension.   Neurological:      Mental Status: She is alert and oriented to person, place, and time.   Psychiatric:         Mood and Affect: Mood normal.         Behavior: Behavior is cooperative.        Result Review :{ Labs  Result Review  Imaging  Med Tab  Media :    PFT Values          2023    11:30   Pre Drug PFT Results   FVC 59   FEV1 29   FEF 25-75% 12   FEV1/FVC 39   Other Tests PFT Results   DLCO 40   D/VAsb 59         Results for orders placed in visit on " 12/27/23    Spirometry with Diffusion Capacity    Narrative  Spirometry with Diffusion Capacity    Performed by: Ar Kendrick MD  Authorized by: Ar Kendrick MD  Pre Drug % Predicted  FVC: 59%  FEV1: 29%  FEF 25-75%: 12%  FEV1/FVC: 39%  DLCO: 40%  D/VAsb: 59%    Interpretation  Spirometry  Spirometry shows very severe obstruction. There is reduced midflow suggesting small airway/airflow obstruction.  Review of FVL curve  Patient's effort is normal.  Diffusion Capacity  The patient's diffusion capacity is moderately reduced.  Diffusion capacity is mildly reduced when corrected for alveolar volume.  Electronically signed by Ar Kendrick MD, 12/28/2023, 14:00 CST      Results for orders placed in visit on 09/23/21    Pulmonary Function Test    Narrative  Pulmonary Function Test  Performed by: John Mckeon CMA  Authorized by: Ar Kendrick MD    Pre Drug % Predicted  FVC: 68.88%  FEV1: 41.27%  FEF 25-75%: 18.12%  FEV1/FVC: 48%    Interpretation  Spirometry  Spirometry shows severe obstruction. There is reduced midflow suggesting small airway/airflow obstruction.  Review of FVL curve  Patient's effort is normal.      Results for orders placed in visit on 04/17/19    Pulmonary Function Test    Rest/Exercise Pulse Ox Values          4/26/2022    15:30   Rest/Exercise Pulse Ox Results   Rest on O2 @ Liters 4L cont   Rest on O2 SAT % 96   Exercise on O2 @ Liters 4L cont   Exercise on O2 SAT % 98                  Assessment and Plan {CC Problem List  Visit Diagnosis  ROS  Review (Popup)  Health Maintenance  Quality  BestPractice  Medications  SmartSets  SnapShot Encounters  Media      Diagnoses and all orders for this visit:    1. Stage 4 very severe COPD by GOLD classification (Primary)  -     Fluticasone-Salmeterol (ADVAIR/WIXELA) 250-50 MCG/ACT DISKUS; Inhale 1 puff 2 (Two) Times a Day.  Dispense: 60 each; Refill: 5    2. Chronic respiratory failure with hypoxia    3. History of  tobacco use  Comments:  quit in 2015  Orders:  -      CT Chest Low Dose Cancer Screening WO; Future        We reviewed pft from  showing very severe obstruction. Change Symbicort to Advair as this is now preferred on her insurance plan. Continue Spiriva. She needs triple therapy given her severe lung disease. She has tried and failed Breztri. Continue albuterol as needed. Continue to abstain from smoking. Schedule for LDCT. Continue supplemental oxygen for which she is complaint and benefiting. Office follow up in 4-5 months or sooner if needed.     Dina Peña, APRN  4/1/2024  14:38 CDT    Follow Up {Instructions Charge Capture  Follow-up Communications   Return in about 5 months (around 9/1/2024).    Patient was given instructions and counseling regarding her condition or for health maintenance advice. Please see specific information pulled into the AVS if appropriate.

## 2024-04-01 ENCOUNTER — OFFICE VISIT (OUTPATIENT)
Dept: PULMONOLOGY | Facility: CLINIC | Age: 74
End: 2024-04-01
Payer: MEDICARE

## 2024-04-01 VITALS
OXYGEN SATURATION: 92 % | DIASTOLIC BLOOD PRESSURE: 68 MMHG | BODY MASS INDEX: 28.17 KG/M2 | WEIGHT: 159 LBS | SYSTOLIC BLOOD PRESSURE: 128 MMHG | HEIGHT: 63 IN | HEART RATE: 99 BPM

## 2024-04-01 DIAGNOSIS — J96.11 CHRONIC RESPIRATORY FAILURE WITH HYPOXIA: Chronic | ICD-10-CM

## 2024-04-01 DIAGNOSIS — J44.9 STAGE 4 VERY SEVERE COPD BY GOLD CLASSIFICATION: Primary | Chronic | ICD-10-CM

## 2024-04-01 DIAGNOSIS — Z87.891 HISTORY OF TOBACCO USE: ICD-10-CM

## 2024-04-01 PROCEDURE — 99214 OFFICE O/P EST MOD 30 MIN: CPT | Performed by: NURSE PRACTITIONER

## 2024-04-01 PROCEDURE — 3074F SYST BP LT 130 MM HG: CPT | Performed by: NURSE PRACTITIONER

## 2024-04-01 PROCEDURE — 1159F MED LIST DOCD IN RCRD: CPT | Performed by: NURSE PRACTITIONER

## 2024-04-01 PROCEDURE — 3078F DIAST BP <80 MM HG: CPT | Performed by: NURSE PRACTITIONER

## 2024-04-01 PROCEDURE — 1160F RVW MEDS BY RX/DR IN RCRD: CPT | Performed by: NURSE PRACTITIONER

## 2024-04-01 RX ORDER — FLUTICASONE PROPIONATE AND SALMETEROL 250; 50 UG/1; UG/1
1 POWDER RESPIRATORY (INHALATION)
Qty: 60 EACH | Refills: 5 | Status: SHIPPED | OUTPATIENT
Start: 2024-04-01 | End: 2024-04-02 | Stop reason: SDUPTHER

## 2024-04-02 DIAGNOSIS — J44.9 STAGE 4 VERY SEVERE COPD BY GOLD CLASSIFICATION: Primary | ICD-10-CM

## 2024-04-02 RX ORDER — FLUTICASONE PROPIONATE AND SALMETEROL 250; 50 UG/1; UG/1
1 POWDER RESPIRATORY (INHALATION)
Qty: 180 EACH | Refills: 3 | Status: SHIPPED | OUTPATIENT
Start: 2024-04-02

## 2024-04-02 NOTE — TELEPHONE ENCOUNTER
Received a PA request for Advair Diskus 250-50, per office note patient states Wixela is covered.  Sending in a script for Wixela in place of Advair Diskus.    Rx Refill Note  Requested Prescriptions     Pending Prescriptions Disp Refills    Fluticasone-Salmeterol (Wixela Inhub) 250-50 MCG/ACT DISKUS 180 each 3     Sig: Inhale 1 puff 2 (Two) Times a Day.      Last office visit with prescribing clinician: 4/1/2024   Last telemedicine visit with prescribing clinician: Visit date not found   Next office visit with prescribing clinician: 9/9/2024                         Would you like a call back once the refill request has been completed: [] Yes [] No    If the office needs to give you a call back, can they leave a voicemail: [] Yes [] No    Magali Underwood MA  04/02/24, 15:33 CDT

## 2024-04-26 ENCOUNTER — HOSPITAL ENCOUNTER (OUTPATIENT)
Dept: CT IMAGING | Facility: HOSPITAL | Age: 74
Discharge: HOME | End: 2024-04-26
Payer: MEDICARE

## 2024-04-26 DIAGNOSIS — Z87.891 HISTORY OF TOBACCO USE: ICD-10-CM

## 2024-04-26 PROCEDURE — 71271 CT THORAX LUNG CANCER SCR C-: CPT

## 2024-06-04 DIAGNOSIS — J44.9 STAGE 4 VERY SEVERE COPD BY GOLD CLASSIFICATION: Primary | ICD-10-CM

## 2024-06-04 RX ORDER — ALBUTEROL SULFATE 90 UG/1
2 AEROSOL, METERED RESPIRATORY (INHALATION) EVERY 4 HOURS PRN
Qty: 54 G | Refills: 0 | Status: SHIPPED | OUTPATIENT
Start: 2024-06-04

## 2024-06-04 NOTE — TELEPHONE ENCOUNTER
Patient called requesting a refill on albuterol HFA be sent to [facility].    Per protocol no cosign required, script sent to pharmacy.    Rx Refill Note  Requested Prescriptions     Pending Prescriptions Disp Refills    albuterol sulfate  (90 Base) MCG/ACT inhaler 54 g 0     Sig: Inhale 2 puffs Every 4 (Four) Hours As Needed for Wheezing.      Last office visit with prescribing clinician: 4/1/2024   Last telemedicine visit with prescribing clinician: Visit date not found   Next office visit with prescribing clinician: 9/9/2024                         Would you like a call back once the refill request has been completed: [] Yes [] No    If the office needs to give you a call back, can they leave a voicemail: [] Yes [] No    Magali Underwood MA  06/04/24, 16:03 CDT

## 2024-06-10 RX ORDER — BUDESONIDE AND FORMOTEROL FUMARATE DIHYDRATE 160; 4.5 UG/1; UG/1
AEROSOL RESPIRATORY (INHALATION)
Qty: 30.6 G | Refills: 2 | Status: SHIPPED | OUTPATIENT
Start: 2024-06-10

## 2024-06-10 NOTE — TELEPHONE ENCOUNTER
Rx Refill Note  Requested Prescriptions     Pending Prescriptions Disp Refills    Symbicort 160-4.5 MCG/ACT inhaler [Pharmacy Med Name: SYMBICORT 160-4.5MCG/ACT AERO] 30.6 g 2     Sig: USE 2 INHALATIONS ORALLY   TWICE DAILY      Last office visit with prescribing clinician: 12/27/2023   Last telemedicine visit with prescribing clinician: Visit date not found   Next office visit with prescribing clinician:09/09/2024                        Would you like a call back once the refill request has been completed: [] Yes [] No    If the office needs to give you a call back, can they leave a voicemail: [] Yes [] No    Heaven Rosa CMA  06/10/24, 16:37 CDT

## 2024-07-24 ENCOUNTER — OFFICE VISIT (OUTPATIENT)
Dept: CARDIOLOGY | Facility: CLINIC | Age: 74
End: 2024-07-24
Payer: MEDICARE

## 2024-07-24 VITALS
OXYGEN SATURATION: 94 % | BODY MASS INDEX: 28 KG/M2 | WEIGHT: 158 LBS | DIASTOLIC BLOOD PRESSURE: 70 MMHG | HEIGHT: 63 IN | SYSTOLIC BLOOD PRESSURE: 127 MMHG | HEART RATE: 86 BPM

## 2024-07-24 DIAGNOSIS — J44.9 STAGE 4 VERY SEVERE COPD BY GOLD CLASSIFICATION: ICD-10-CM

## 2024-07-24 DIAGNOSIS — I10 ESSENTIAL HYPERTENSION: ICD-10-CM

## 2024-07-24 DIAGNOSIS — I51.81 TAKOTSUBO CARDIOMYOPATHY: Primary | ICD-10-CM

## 2024-07-24 RX ORDER — CLOBETASOL PROPIONATE 0.5 MG/G
CREAM TOPICAL
COMMUNITY
Start: 2024-05-02

## 2024-07-24 RX ORDER — BACLOFEN 10 MG/1
TABLET ORAL
COMMUNITY

## 2024-07-24 NOTE — PROGRESS NOTES
"    Subjective:     Encounter Date:07/24/2024      Patient ID: Joana Cuevas is a 73 y.o. female     Chief Complaint:\"no complaints\"  Shortness of Breath  This is a chronic problem. The current episode started more than 1 year ago. The problem occurs daily. The problem has been waxing and waning. Pertinent negatives include no chest pain, leg swelling, orthopnea, PND, rash, sputum production, syncope or wheezing.   Cardiomyopathy  This is a chronic problem. The current episode started more than 1 month ago. The problem occurs daily. The problem has been gradually improving. Pertinent negatives include no chest pain, coughing, rash or weakness.       Patient presents today for a follow up. She was initially seen as a hospital consult in 8/2021 for an elevated troponin. At that time, she had a 2D echo which revealed an EF of 40% therefore she underwent a [facility] with angiography revealing normal coronary arteries with presence of Takostubo cardiomyopathy. She was then started on Lisinopril and Toprol due to reduced EF however was unable to tolerate either medication due to issues with hypotension. She had a repeat 2D echo in Feb to see if EF had recovered which revealed EF 55%, borderline dilated RV, mildly dilated RA and presence of a trivial pericardial effusion.      She reports she has been well from a cardiac standpoint. Unfortunately her  passed away recently. Her dyspnea remains unchanged and she remains on 3L O2 via NC. She monitors her BP and notes it to be controlled with systolic readings in the 120s. She denies chest pain, palpitations and edema.     The following portions of the patient's history were reviewed and updated as appropriate: allergies, current medications, past family history, past medical history, past social history, past surgical history and problem list.    No Known Allergies    Current Outpatient Medications:     acetaminophen (TYLENOL) 500 MG tablet, Take 1 tablet by mouth Every " 6 (Six) Hours As Needed for Mild Pain., Disp: , Rfl:     albuterol sulfate  (90 Base) MCG/ACT inhaler, Inhale 2 puffs Every 4 (Four) Hours As Needed for Wheezing., Disp: 54 g, Rfl: 0    atorvastatin (LIPITOR) 40 MG tablet, Take 1 tablet by mouth Every Night., Disp: 90 tablet, Rfl: 3    azelastine (OPTIVAR) 0.05 % ophthalmic solution, INSTILL 1 DROP INTO AFFECTED EYE TWICE A DAY, Disp: , Rfl:     baclofen (LIORESAL) 10 MG tablet, Take 1 tablet 3 times a day by oral route as needed., Disp: , Rfl:     Biotin 88705 MCG tablet, biotin  one daily 10,000mcg, Disp: , Rfl:     cetirizine (zyrTEC) 10 MG tablet, Take 1 tablet by mouth Daily., Disp: , Rfl:     chlorpheniramine (CHLOR-TRIMETON) 4 MG tablet, Take 1 tablet by mouth Every 6 (Six) Hours As Needed for Allergies., Disp: , Rfl:     clobetasol propionate (TEMOVATE) 0.05 % cream, , Disp: , Rfl:     ELDERBERRY PO, Take  by mouth., Disp: , Rfl:     guaiFENesin (MUCINEX) 600 MG 12 hr tablet, Take 1 tablet by mouth Every 12 (Twelve) Hours., Disp: 20 tablet, Rfl: 0    O2 (OXYGEN), oxygen  3 lpm, Disp: , Rfl:     sertraline (ZOLOFT) 25 MG tablet, Take 1 tablet by mouth Daily., Disp: , Rfl:     Spiriva HandiHaler 18 MCG per inhalation capsule, INHALE THE CONTENTS OF ONE CAPSULE DAILY, Disp: 90 capsule, Rfl: 3    Symbicort 160-4.5 MCG/ACT inhaler, USE 2 INHALATIONS ORALLY   TWICE DAILY, Disp: 30.6 g, Rfl: 2  Past Medical History:   Diagnosis Date    Breast cancer     Cancer     Chronic bronchitis     Guillain Barré syndrome 2016    Laceration of head 2019    Pneumonia     Syncope 2019       Social History     Socioeconomic History    Marital status:    Tobacco Use    Smoking status: Former     Current packs/day: 0.00     Average packs/day: 2.0 packs/day for 35.0 years (70.0 ttl pk-yrs)     Types: Cigarettes     Start date:      Quit date: 2015     Years since quittin.5     Passive exposure: Current    Smokeless tobacco: Never   Vaping Use     Vaping status: Never Used   Substance and Sexual Activity    Alcohol use: Yes     Alcohol/week: 5.0 standard drinks of alcohol     Types: 5 Cans of beer per week    Drug use: No    Sexual activity: Defer       Review of Systems   Constitutional: Negative for malaise/fatigue, weight gain and weight loss.   Cardiovascular:  Negative for chest pain, dyspnea on exertion, irregular heartbeat, leg swelling, near-syncope, orthopnea, palpitations, paroxysmal nocturnal dyspnea and syncope.   Respiratory:  Positive for shortness of breath. Negative for cough, sleep disturbances due to breathing, sputum production and wheezing.    Skin:  Negative for dry skin, flushing, itching and rash.   Gastrointestinal:  Negative for hematemesis and hematochezia.   Neurological:  Negative for dizziness, light-headedness, loss of balance and weakness.   All other systems reviewed and are negative.         Objective:     Vitals reviewed.   Constitutional:       General: Not in acute distress.     Appearance: Healthy appearance. Well-developed. Not diaphoretic.      Interventions: Nasal cannula in place.   Eyes:      General: No scleral icterus.     Conjunctiva/sclera: Conjunctivae normal.      Pupils: Pupils are equal, round, and reactive to light.   HENT:      Head: Normocephalic.    Mouth/Throat:      Pharynx: No oropharyngeal exudate.   Neck:      Vascular: No JVR.   Pulmonary:      Effort: Pulmonary effort is normal. No respiratory distress.      Breath sounds: Examination of the right-upper field reveals rales. Examination of the left-upper field reveals rales. Examination of the right-lower field reveals rales. Examination of the left-lower field reveals rales. No wheezing. No rhonchi. Rales present.   Chest:      Chest wall: Not tender to palpatation.   Cardiovascular:      Normal rate. Regular rhythm.   Pulses:     Intact distal pulses.   Edema:     Peripheral edema absent.   Abdominal:      General: Bowel sounds are normal. There  "is no distension.      Palpations: Abdomen is soft.      Tenderness: There is no abdominal tenderness.   Musculoskeletal: Normal range of motion.      Cervical back: Normal range of motion and neck supple. Skin:     General: Skin is warm and dry.      Coloration: Skin is not pale.      Findings: No erythema or rash.   Neurological:      Mental Status: Alert, oriented to person, place, and time and oriented to person, place and time.      Deep Tendon Reflexes: Reflexes are normal and symmetric.   Psychiatric:         Behavior: Behavior normal.             ECG 12 Lead    Date/Time: 7/24/2024 2:24 PM  Performed by: Alexa Martinez APRN    Authorized by: Alexa Martinez APRN  Comparison: compared with previous ECG from 7/17/2023  Similar to previous ECG  Comparison to previous ECG: Ventricular rate has decreased by 25bpm  Rhythm: sinus rhythm  Rate: normal  BPM: 86  Conduction: conduction normal  ST Segments: ST segments normal  T Waves: T waves normal  QRS axis: normal  Other: no other findings    Clinical impression: normal ECG        /70 (BP Location: Right arm, Patient Position: Sitting, Cuff Size: Adult)   Pulse 86   Ht 160 cm (63\")   Wt 71.7 kg (158 lb)   LMP  (LMP Unknown)   SpO2 94%   BMI 27.99 kg/m²     Lab Review:   I have reviewed previous office notes, recent labs and recent cardiac testing.     Results for orders placed during the hospital encounter of 02/06/23    Adult Transthoracic Echo Complete w/ Color, Spectral and Contrast if necessary per protocol    Interpretation Summary    Left ventricular systolic function is normal. Estimated left ventricular EF = 55%    Left ventricular diastolic function is consistent with (grade I) impaired relaxation.    The right ventricular cavity is borderline dilated.    The right atrial cavity is mildly  dilated.    There is a trivial pericardial effusion.      Cath 8/10/2022:  Conclusion:  Normal coronary arteries  LVEF 40% with anteroapical dyskinesis " consistent with Takotsubo's myocarditis           Assessment:          Diagnosis Plan   1. Takotsubo cardiomyopathy        2. Essential hypertension        3. Stage 4 very severe COPD by GOLD classification             Plan:       1. Takotsubo Cardiomyopathy- normal coronary arteries per cath in 8/2022 with EF improved to 55% from previous 40% at the time of diagnosis. Unable to tolerate GDMT due to issues with hypotension.   2. HTN- controlled without use of antihypertensives.   3. COPD- stable. On 3L NC continuous. Follows with pulmonary.         Follow up in 1 year or sooner if symptoms worsen.     I spent 30 minutes caring for Joana on this date of service. This time includes time spent by me in the following activities:preparing for the visit, reviewing tests, obtaining and/or reviewing a separately obtained history, performing a medically appropriate examination and/or evaluation , counseling and educating the patient/family/caregiver, documenting information in the medical record, independently interpreting results and communicating that information with the patient/family/caregiver and care coordination     I spent 2 minutes on the separately reported service of EKG interpretation. This time is not included in the time used to support the E/M service also reported today.

## 2024-08-28 NOTE — PROGRESS NOTES
Chief Complaint  COPD    Subjective    History of Present Illness {CC  Problem List  Visit Diagnosis   Encounters  Notes  Medications  Labs  Result Review Imaging  Media     Joana Cuevas presents to [facility] for:    History of Present Illness  Ms. Cuevas is here for follow up and management of stage 4 copd. She continues on Breyna and Spiriva. She continues on supplemental oxygen at 3l continuously for which she is complaint and benefiting.  She feels like she has been using her rescue inhaler little bit more lately.  She lost her  a couple of months ago.  She has good days and bad days with her breathing.  LDCT was completed April 2024.       Prior to Admission medications    Medication Sig Start Date End Date Taking? Authorizing Provider   acetaminophen (TYLENOL) 500 MG tablet Take 1 tablet by mouth Every 6 (Six) Hours As Needed for Mild Pain.    Mike Davidson MD   albuterol sulfate  (90 Base) MCG/ACT inhaler Inhale 2 puffs Every 4 (Four) Hours As Needed for Wheezing. 6/4/24   Dina Peña APRN   atorvastatin (LIPITOR) 40 MG tablet Take 1 tablet by mouth Every Night. 9/23/22   Alexa Martinez APRN   azelastine (OPTIVAR) 0.05 % ophthalmic solution INSTILL 1 DROP INTO AFFECTED EYE TWICE A DAY 9/10/22   Mike Davidson MD   baclofen (LIORESAL) 10 MG tablet Take 1 tablet 3 times a day by oral route as needed.    Mike Davidson MD   Biotin 36210 MCG tablet biotin   one daily 10,000mcg    Mike Davidson MD   cetirizine (zyrTEC) 10 MG tablet Take 1 tablet by mouth Daily.    Mike Davidson MD   chlorpheniramine (CHLOR-TRIMETON) 4 MG tablet Take 1 tablet by mouth Every 6 (Six) Hours As Needed for Allergies.    Mike Davidson MD   clobetasol propionate (TEMOVATE) 0.05 % cream  5/2/24   Provider, Historical, MD   ELDERBERRY PO Take  by mouth.    Mike Davidson MD   guaiFENesin (MUCINEX) 600  "MG 12 hr tablet Take 1 tablet by mouth Every 12 (Twelve) Hours. 22   Danii Mensah APRN   O2 (OXYGEN) oxygen   3 lpm    Provider, MD Mike   sertraline (ZOLOFT) 25 MG tablet Take 1 tablet by mouth Daily.    Provider, MD Mike   Spiriva HandiHaler 18 MCG per inhalation capsule INHALE THE CONTENTS OF ONE CAPSULE DAILY 23   Ar Kendrick MD   Symbicort 160-4.5 MCG/ACT inhaler USE 2 INHALATIONS ORALLY   TWICE DAILY 6/10/24   Dina Peña APRN       Social History     Socioeconomic History    Marital status:    Tobacco Use    Smoking status: Former     Current packs/day: 0.00     Average packs/day: 2.0 packs/day for 35.0 years (70.0 ttl pk-yrs)     Types: Cigarettes     Start date:      Quit date:      Years since quittin.6     Passive exposure: Current    Smokeless tobacco: Never   Vaping Use    Vaping status: Never Used   Substance and Sexual Activity    Alcohol use: Yes     Alcohol/week: 5.0 standard drinks of alcohol     Types: 5 Cans of beer per week    Drug use: No    Sexual activity: Defer       Objective   Vital Signs:   /82   Pulse 103   Ht 160 cm (63\")   Wt 72.1 kg (159 lb)   SpO2 95% Comment: 3PD  BMI 28.17 kg/m²     Physical Exam  Constitutional:       General: She is not in acute distress.     Interventions: Nasal cannula in place.   HENT:      Head: Normocephalic.      Nose: Nose normal.      Mouth/Throat:      Mouth: Mucous membranes are moist.   Eyes:      General: No scleral icterus.  Cardiovascular:      Rate and Rhythm: Normal rate.   Pulmonary:      Effort: No respiratory distress.      Breath sounds: Decreased breath sounds present.   Abdominal:      General: There is no distension.   Musculoskeletal:      Comments: walker   Neurological:      Mental Status: She is alert and oriented to person, place, and time.   Psychiatric:         Mood and Affect: Mood normal.         Behavior: Behavior is cooperative.        Result Review :{ Labs "  Result Review  Imaging  Med Tab  Media :    PFT Values          12/27/2023    11:30   Pre Drug PFT Results   FVC 59   FEV1 29   FEF 25-75% 12   FEV1/FVC 39   Other Tests PFT Results   DLCO 40   D/VAsb 59         Results for orders placed in visit on 12/27/23    Spirometry with Diffusion Capacity    Narrative  Spirometry with Diffusion Capacity    Performed by: Ar Kendrick MD  Authorized by: Ar Kendrick MD  Pre Drug % Predicted  FVC: 59%  FEV1: 29%  FEF 25-75%: 12%  FEV1/FVC: 39%  DLCO: 40%  D/VAsb: 59%    Interpretation  Spirometry  Spirometry shows very severe obstruction. There is reduced midflow suggesting small airway/airflow obstruction.  Review of FVL curve  Patient's effort is normal.  Diffusion Capacity  The patient's diffusion capacity is moderately reduced.  Diffusion capacity is mildly reduced when corrected for alveolar volume.  Electronically signed by Ar Kendrick MD, 12/28/2023, 14:00 CST      Results for orders placed in visit on 09/23/21    Pulmonary Function Test    Narrative  Pulmonary Function Test  Performed by: John Mckeon CMA  Authorized by: Ar Kendrick MD    Pre Drug % Predicted  FVC: 68.88%  FEV1: 41.27%  FEF 25-75%: 18.12%  FEV1/FVC: 48%    Interpretation  Spirometry  Spirometry shows severe obstruction. There is reduced midflow suggesting small airway/airflow obstruction.  Review of FVL curve  Patient's effort is normal.      Results for orders placed in visit on 04/17/19    Pulmonary Function Test             CT Chest Low Dose Cancer Screening WO (04/26/2024 15:37)     My interpretation of imaging: Emphysema, no suspicious lung nodules        Assessment and Plan {CC Problem List  Visit Diagnosis  ROS  Review (Popup)  Health Maintenance  Quality  BestPractice  Medications  SmartSets  SnapShot Encounters  Media      Diagnoses and all orders for this visit:    1. Stage 4 very severe COPD by GOLD classification  (Primary)  Comments:  Continue current COPD regimen with Breyna and Spiriva.  Albuterol as needed.  Rx nebulizer as needed  Orders:  -     albuterol (PROVENTIL) (2.5 MG/3ML) 0.083% nebulizer solution; Take 2.5 mg by nebulization 4 (Four) Times a Day As Needed for Wheezing.  Dispense: 1 each; Refill: 3    2. Chronic respiratory failure with hypoxia  Comments:  Continue supplemental oxygen for which she is compliant and benefiting    3. History of tobacco use  Comments:  She quit smoking in 2015.  Yearly LDCT which will be due April 2025    4. Centrilobular emphysema  Comments:  Check alpha-1 antitrypsin  Orders:  -     Alpha - 1 - Antitrypsin; Future          Plan as above.  Office follow-up in a few months or sooner if needed.    Dina Peña, APRN  9/9/2024  14:30 CDT    Follow Up {Instructions Charge Capture  Follow-up Communications   Return in about 5 months (around 2/9/2025).    Patient was given instructions and counseling regarding her condition or for health maintenance advice. Please see specific information pulled into the AVS if appropriate.

## 2024-09-09 ENCOUNTER — OFFICE VISIT (OUTPATIENT)
Dept: PULMONOLOGY | Facility: CLINIC | Age: 74
End: 2024-09-09
Payer: MEDICARE

## 2024-09-09 VITALS
OXYGEN SATURATION: 95 % | DIASTOLIC BLOOD PRESSURE: 82 MMHG | SYSTOLIC BLOOD PRESSURE: 130 MMHG | HEART RATE: 103 BPM | HEIGHT: 63 IN | WEIGHT: 159 LBS | BODY MASS INDEX: 28.17 KG/M2

## 2024-09-09 DIAGNOSIS — J44.9 STAGE 4 VERY SEVERE COPD BY GOLD CLASSIFICATION: Primary | Chronic | ICD-10-CM

## 2024-09-09 DIAGNOSIS — Z87.891 HISTORY OF TOBACCO USE: Chronic | ICD-10-CM

## 2024-09-09 DIAGNOSIS — J43.2 CENTRILOBULAR EMPHYSEMA: Chronic | ICD-10-CM

## 2024-09-09 DIAGNOSIS — J96.11 CHRONIC RESPIRATORY FAILURE WITH HYPOXIA: Chronic | ICD-10-CM

## 2024-09-09 PROCEDURE — 99214 OFFICE O/P EST MOD 30 MIN: CPT | Performed by: NURSE PRACTITIONER

## 2024-09-09 PROCEDURE — 1160F RVW MEDS BY RX/DR IN RCRD: CPT | Performed by: NURSE PRACTITIONER

## 2024-09-09 PROCEDURE — 1159F MED LIST DOCD IN RCRD: CPT | Performed by: NURSE PRACTITIONER

## 2024-09-09 PROCEDURE — 3075F SYST BP GE 130 - 139MM HG: CPT | Performed by: NURSE PRACTITIONER

## 2024-09-09 PROCEDURE — 3079F DIAST BP 80-89 MM HG: CPT | Performed by: NURSE PRACTITIONER

## 2024-09-09 RX ORDER — PHENOL 1.4 %
600 AEROSOL, SPRAY (ML) MUCOUS MEMBRANE DAILY
COMMUNITY

## 2024-09-09 RX ORDER — ALBUTEROL SULFATE 0.83 MG/ML
2.5 SOLUTION RESPIRATORY (INHALATION) 4 TIMES DAILY PRN
Qty: 1 EACH | Refills: 3 | Status: SHIPPED | OUTPATIENT
Start: 2024-09-09

## 2024-09-10 DIAGNOSIS — J44.9 STAGE 4 VERY SEVERE COPD BY GOLD CLASSIFICATION: ICD-10-CM

## 2024-09-10 RX ORDER — TIOTROPIUM BROMIDE 18 UG/1
CAPSULE ORAL; RESPIRATORY (INHALATION)
Qty: 90 CAPSULE | Refills: 2 | Status: SHIPPED | OUTPATIENT
Start: 2024-09-10

## 2024-09-10 NOTE — TELEPHONE ENCOUNTER
Rx Refill Note  Requested Prescriptions     Pending Prescriptions Disp Refills    Spiriva HandiHaler 18 MCG per inhalation capsule [Pharmacy Med Name: SPIRIVA HANDIHALER 18MCG CAPS] 90 capsule 2     Sig: INHALE THE CONTENTS OF ONE CAPSULE DAILY      Last office visit with prescribing clinician: 12/27/2023   Last telemedicine visit with prescribing clinician: Visit date not found   Next office visit with prescribing clinician: 2/20/2025                         Would you like a call back once the refill request has been completed: [] Yes [] No    If the office needs to give you a call back, can they leave a voicemail: [] Yes [] No    Rafaela Mascorro MA  09/10/24, 14:08 CDT

## 2024-09-11 RX ORDER — ALBUTEROL SULFATE 90 UG/1
2 AEROSOL, METERED RESPIRATORY (INHALATION) EVERY 4 HOURS PRN
Qty: 54 G | Refills: 0 | Status: SHIPPED | OUTPATIENT
Start: 2024-09-11

## 2024-09-11 NOTE — TELEPHONE ENCOUNTER
Rx Refill Note  Requested Prescriptions     Pending Prescriptions Disp Refills    albuterol sulfate  (90 Base) MCG/ACT inhaler [Pharmacy Med Name: ALBUTEROL SUL HFA INH AER 18 GM 90] 54 g 0     Sig: INHALE TWO PUFFS BY MOUTH EVERY 4 HOURS AS NEEDED FOR WHEEZING.      Last office visit with prescribing clinician: 9/9/2024   Last telemedicine visit with prescribing clinician: Visit date not found   Next office visit with prescribing clinician: Visit date not found                         Would you like a call back once the refill request has been completed: [] Yes [] No    If the office needs to give you a call back, can they leave a voicemail: [] Yes [] No    Dalia Lam MA  09/11/24, 07:30 CDT

## 2024-09-17 ENCOUNTER — TELEPHONE (OUTPATIENT)
Dept: PULMONOLOGY | Facility: CLINIC | Age: 74
End: 2024-09-17
Payer: MEDICARE

## 2024-09-17 DIAGNOSIS — J43.2 CENTRILOBULAR EMPHYSEMA: Chronic | ICD-10-CM

## 2024-12-02 ENCOUNTER — TELEPHONE (OUTPATIENT)
Dept: CARDIOLOGY | Facility: CLINIC | Age: 74
End: 2024-12-02
Payer: MEDICARE

## 2024-12-02 NOTE — TELEPHONE ENCOUNTER
"----- Message from Alexa Martinez sent at 12/2/2024  4:10 PM CST -----  She is low risk for any cardiac event with cataract surgery. I am not sure what other \"risk\" they are referring to.  ----- Message -----  From: Obdulia Cleveland MA  Sent: 12/2/2024   3:21 PM CST  To: JOHANNE Ramires    Patient called in saying that she needs cataract surgery and her surgeon stated that she may be at risk for this surgery.  She is very worried and wants to know just how much she is at risk and what exactly she is at risk for.  Please advise.  I do not have a cardiac clearance for her yet.  Obdulia Cleveland MA  "

## 2025-01-10 DIAGNOSIS — J44.9 STAGE 4 VERY SEVERE COPD BY GOLD CLASSIFICATION: ICD-10-CM

## 2025-01-10 RX ORDER — ALBUTEROL SULFATE 90 UG/1
2 INHALANT RESPIRATORY (INHALATION) EVERY 4 HOURS PRN
Qty: 54 G | Refills: 0 | Status: SHIPPED | OUTPATIENT
Start: 2025-01-10

## 2025-01-10 NOTE — TELEPHONE ENCOUNTER
Please send refill to [facility] thank you   Requested Prescriptions     Pending Prescriptions Disp Refills    albuterol sulfate  (90 Base) MCG/ACT inhaler 54 g 0     Sig: Inhale 2 puffs Every 4 (Four) Hours As Needed for Wheezing.      Last office visit with prescribing clinician: 12/27/2023   Last telemedicine visit with prescribing clinician: Visit date not found   Next office visit with prescribing clinician: 2/20/2025                         Would you like a call back once the refill request has been completed: [] Yes [] No    If the office needs to give you a call back, can they leave a voicemail: [] Yes [] No    Dalia Lam MA  01/10/25, 06:59 CST

## 2025-02-05 DIAGNOSIS — J96.11 CHRONIC RESPIRATORY FAILURE WITH HYPOXIA: ICD-10-CM

## 2025-02-05 DIAGNOSIS — J84.10 PULMONARY FIBROSIS: ICD-10-CM

## 2025-02-05 DIAGNOSIS — J43.2 CENTRILOBULAR EMPHYSEMA: ICD-10-CM

## 2025-02-05 DIAGNOSIS — Z87.891 HISTORY OF TOBACCO USE: ICD-10-CM

## 2025-02-05 DIAGNOSIS — J44.9 STAGE 4 VERY SEVERE COPD BY GOLD CLASSIFICATION: Primary | ICD-10-CM

## 2025-02-05 RX ORDER — BUDESONIDE AND FORMOTEROL FUMARATE 160; 4.5 UG/1; UG/1
2 AEROSOL, METERED RESPIRATORY (INHALATION) 2 TIMES DAILY
Qty: 30.9 G | Refills: 2 | Status: SHIPPED | OUTPATIENT
Start: 2025-02-05

## 2025-02-05 NOTE — TELEPHONE ENCOUNTER
Rx Refill Note  Requested Prescriptions     Pending Prescriptions Disp Refills    Breyna 160-4.5 MCG/ACT inhaler [Pharmacy Med Name: BREYNA 160-4.5MCG/ACT AERO] 30.9 g 2     Sig: INHALE TWO PUFFS BY MOUTH TWICE A DAY      Last office visit with prescribing clinician: 9/9/2024   Last telemedicine visit with prescribing clinician: Visit date not found   Next office visit with prescribing clinician: Visit date not found                         Would you like a call back once the refill request has been completed: [] Yes [] No    If the office needs to give you a call back, can they leave a voicemail: [] Yes [] No    Renu Jackson MA  02/05/25, 08:41 CST

## 2025-04-11 DIAGNOSIS — J44.9 STAGE 4 VERY SEVERE COPD BY GOLD CLASSIFICATION: ICD-10-CM

## 2025-04-11 RX ORDER — ALBUTEROL SULFATE 90 UG/1
2 INHALANT RESPIRATORY (INHALATION) EVERY 4 HOURS PRN
Qty: 54 G | Refills: 0 | Status: SHIPPED | OUTPATIENT
Start: 2025-04-11

## 2025-04-11 NOTE — TELEPHONE ENCOUNTER
Passed protocol     Rx Refill Note  Requested Prescriptions     Pending Prescriptions Disp Refills    albuterol sulfate  (90 Base) MCG/ACT inhaler [Pharmacy Med Name: ALBUTEROL SUL HFA INH AER 18 GM 90] 54 g 0     Sig: INHALE TWO PUFFS BY MOUTH EVERY 4 HOURS AS NEEDED FOR WHEEZING      Last office visit with prescribing clinician: 12/27/2023   Last telemedicine visit with prescribing clinician: Visit date not found   Next office visit with prescribing clinician: 7/24/2025                         Would you like a call back once the refill request has been completed: [] Yes [] No    If the office needs to give you a call back, can they leave a voicemail: [] Yes [] No    Kobe Robbins MA  04/11/25, 15:30 CDT

## 2025-04-14 RX ORDER — TIOTROPIUM BROMIDE 18 UG/1
CAPSULE ORAL; RESPIRATORY (INHALATION)
Qty: 90 CAPSULE | Refills: 3 | Status: SHIPPED | OUTPATIENT
Start: 2025-04-14

## 2025-04-14 NOTE — TELEPHONE ENCOUNTER
Received a request from the pharmacy for refills on Spiriva handihaler.    Per protocol no cosign required, script sent to pharmacy.    Rx Refill Note  Requested Prescriptions     Pending Prescriptions Disp Refills    Spiriva HandiHaler 18 MCG per inhalation capsule [Pharmacy Med Name: SPIRIVA HANDIHALER 18MCG CAPS] 90 capsule 3     Sig: INHALE THE CONTENTS OF ONE CAPSULE DAILY      Last office visit with prescribing clinician: 12/27/2023   Last telemedicine visit with prescribing clinician: Visit date not found   Next office visit with prescribing clinician: 7/24/2025                         Would you like a call back once the refill request has been completed: [] Yes [] No    If the office needs to give you a call back, can they leave a voicemail: [] Yes [] No    Magali Underwood MA  04/14/25, 08:24 CDT

## 2025-07-22 DIAGNOSIS — J44.9 STAGE 4 VERY SEVERE COPD BY GOLD CLASSIFICATION: ICD-10-CM

## 2025-07-22 RX ORDER — ALBUTEROL SULFATE 90 UG/1
2 INHALANT RESPIRATORY (INHALATION) EVERY 4 HOURS PRN
Qty: 54 G | Refills: 3 | Status: SHIPPED | OUTPATIENT
Start: 2025-07-22

## 2025-07-22 NOTE — TELEPHONE ENCOUNTER
Received a request from the pharmacy for refills on albuterol HFA.    Per protocol no cosign required, script sent to pharmacy.    Rx Refill Note  Requested Prescriptions     Pending Prescriptions Disp Refills    albuterol sulfate  (90 Base) MCG/ACT inhaler [Pharmacy Med Name: ALBUTEROL SUL HFA INH AER 18 GM 90] 54 g 3     Sig: INHALE TWO PUFFS BY MOUTH EVERY 4 HOURS AS NEEDED FOR WHEEZING      Last office visit with prescribing clinician: 12/27/2023   Last telemedicine visit with prescribing clinician: Visit date not found   Next office visit with prescribing clinician: 7/24/2025                         Would you like a call back once the refill request has been completed: [] Yes [] No    If the office needs to give you a call back, can they leave a voicemail: [] Yes [] No    Magali Underwood MA  07/22/25, 16:25 CDT

## 2025-07-24 ENCOUNTER — OFFICE VISIT (OUTPATIENT)
Dept: PULMONOLOGY | Facility: CLINIC | Age: 75
End: 2025-07-24
Payer: MEDICARE

## 2025-07-24 VITALS
WEIGHT: 159 LBS | SYSTOLIC BLOOD PRESSURE: 124 MMHG | HEART RATE: 122 BPM | OXYGEN SATURATION: 94 % | HEIGHT: 63 IN | BODY MASS INDEX: 28.17 KG/M2 | DIASTOLIC BLOOD PRESSURE: 74 MMHG

## 2025-07-24 DIAGNOSIS — J44.9 STAGE 4 VERY SEVERE COPD BY GOLD CLASSIFICATION: Primary | ICD-10-CM

## 2025-07-24 DIAGNOSIS — Z87.891 HISTORY OF CIGARETTE SMOKING: ICD-10-CM

## 2025-07-24 DIAGNOSIS — J43.2 CENTRILOBULAR EMPHYSEMA: ICD-10-CM

## 2025-07-24 NOTE — PROGRESS NOTES
"Background:  Pt with copd focal fibrosis 2019.  hx gbs.   Chief Complaint  COPD    Subjective    History of Present Illness     Joana Cuevas is here for follow up with [facility].  History of Present Illness  She had catarct surgery and did well.  She was almost blind before that.  Breathing is ok.  Her shortness of breath happens in the heat and with exertion.  She uses brenya, Spiriva and albuterol.  She has cough sometimes which is bad and responds to vicks 44d when needed.       Tobacco Use: Medium Risk (7/24/2025)    Patient History     Smoking Tobacco Use: Former     Smokeless Tobacco Use: Never     Passive Exposure: Current      Current Outpatient Medications   Medication Instructions    acetaminophen (TYLENOL) 500 mg, Every 6 Hours PRN    albuterol (PROVENTIL) 2.5 mg, Nebulization, 4 Times Daily PRN    albuterol sulfate  (90 Base) MCG/ACT inhaler 2 puffs, Every 4 Hours PRN    atorvastatin (LIPITOR) 40 mg, Oral, Nightly    azelastine (OPTIVAR) 0.05 % ophthalmic solution INSTILL 1 DROP INTO AFFECTED EYE TWICE A DAY    baclofen (LIORESAL) 10 MG tablet Take 1 tablet 3 times a day by oral route as needed.    Biotin 20240 MCG tablet biotin   one daily 10,000mcg    Breyna 160-4.5 MCG/ACT inhaler 2 puffs, 2 Times Daily    calcium carbonate (OS-AILYN) 600 mg, Daily    cetirizine (ZYRTEC) 10 mg, Daily    chlorpheniramine (CHLOR-TRIMETON) 4 mg, Every 6 Hours PRN    clobetasol propionate (TEMOVATE) 0.05 % cream     ELDERBERRY PO Take  by mouth.    Mucus Relief  mg, Oral, Every 12 Hours Scheduled    multivitamin with minerals (CENTRUM SILVER ADULT 50+ PO) 1 tablet, Daily    O2 (OXYGEN) oxygen   3 lpm    sertraline (ZOLOFT) 25 mg, Daily    Spiriva HandiHaler 18 MCG per inhalation capsule INHALE THE CONTENTS OF ONE CAPSULE DAILY    vitamin D3 5,000 Units, Daily      Objective     Vital Signs:   /74   Pulse (!) 122   Ht 160 cm (63\")   Wt 72.1 kg " (159 lb)   SpO2 94% Comment: 3LPD  BMI 28.17 kg/m²   Physical Exam  Constitutional:       General: She is not in acute distress.     Appearance: She is ill-appearing. She is not toxic-appearing.      Interventions: Nasal cannula in place.   Pulmonary:      Effort: No respiratory distress.      Breath sounds: No wheezing.        Result Review  Data Reviewed:     CT Chest Low Dose Cancer Screening WO (04/26/2024 15:37)   No suspicious pulmonary nodule. Moderate  emphysematous changes. No consolidation, pleural effusion, or  pneumothorax. Mild scarring in the lung bases.       PFT Values          12/27/2023    11:30   Pre Drug PFT Results   FVC 59   FEV1 29   FEF 25-75% 12   FEV1/FVC 39   Other Tests PFT Results   DLCO 40   D/VAsb 59                Assessment and Plan    Diagnoses and all orders for this visit:    1. Stage 4 very severe COPD by GOLD classification (Primary)    2. Centrilobular emphysema    3. History of cigarette smoking  -      CT Chest Low Dose Cancer Screening WO; Future    Will plan screening ct  Continue triple inhaler tx  Plan ohtuvayre if symptoms worsen.    Follow Up   Return in about 6 months (around 1/24/2026).  Patient was given instructions and counseling regarding her condition or for health maintenance advice. Please see specific information pulled into the AVS if appropriate.    Electronically signed by Ar Kendrick MD, 7/25/2025, 09:04 CDT

## 2025-07-30 ENCOUNTER — OFFICE VISIT (OUTPATIENT)
Dept: CARDIOLOGY | Facility: CLINIC | Age: 75
End: 2025-07-30
Payer: MEDICARE

## 2025-07-30 VITALS
DIASTOLIC BLOOD PRESSURE: 64 MMHG | HEART RATE: 107 BPM | HEIGHT: 63 IN | OXYGEN SATURATION: 96 % | WEIGHT: 166 LBS | SYSTOLIC BLOOD PRESSURE: 136 MMHG | BODY MASS INDEX: 29.41 KG/M2

## 2025-07-30 DIAGNOSIS — R00.0 SINUS TACHYCARDIA: ICD-10-CM

## 2025-07-30 DIAGNOSIS — J44.9 STAGE 4 VERY SEVERE COPD BY GOLD CLASSIFICATION: ICD-10-CM

## 2025-07-30 DIAGNOSIS — I51.81 TAKOTSUBO CARDIOMYOPATHY: Primary | ICD-10-CM

## 2025-07-30 DIAGNOSIS — I10 ESSENTIAL HYPERTENSION: ICD-10-CM

## 2025-07-30 NOTE — PROGRESS NOTES
"    Subjective:     Encounter Date:07/30/2025      Patient ID: Joana Cuevas is a 74 y.o. female     Chief Complaint:\"no complaints\"  Shortness of Breath  This is a chronic problem. The current episode started more than 1 year ago. The problem occurs daily. The problem has been waxing and waning. Pertinent negatives include no chest pain, leg swelling, orthopnea, PND, rash, sputum production, syncope or wheezing.   Cardiomyopathy  This is a chronic problem. The current episode started more than 1 month ago. The problem occurs daily. The problem has been gradually improving. Pertinent negatives include no chest pain, coughing, rash or weakness.     Patient presents today for a follow up. She was initially seen as a hospital consult in 8/2021 for an elevated troponin. At that time, she had a 2D echo which revealed an EF of 40% therefore she underwent a [facility] with angiography revealing normal coronary arteries with presence of Takostubo cardiomyopathy. She was then started on Lisinopril and Toprol due to reduced EF however was unable to tolerate either medication due to issues with hypotension. She had a repeat 2D echo in Feb to see if EF had recovered which revealed EF 55%, borderline dilated RV, mildly dilated RA and presence of a trivial pericardial effusion.      She presents today with her daughter and reports she has been well from a cardiac standpoint. She has noticed her HR has been higher the past few day per her pulse oximeter however denies symptoms of palpitations. She also reports her breathing has been shorter the past few days as well. She remains on 3L O2 via NC. She monitors her BP and notes it to be controlled with systolic readings in the 120s. She denies chest pain and edema.     The following portions of the patient's history were reviewed and updated as appropriate: allergies, current medications, past family history, past medical history, past social history, past surgical history and problem " list.    No Known Allergies    Current Outpatient Medications:     acetaminophen (TYLENOL) 500 MG tablet, Take 1 tablet by mouth Every 6 (Six) Hours As Needed for Mild Pain., Disp: , Rfl:     albuterol (PROVENTIL) (2.5 MG/3ML) 0.083% nebulizer solution, Take 2.5 mg by nebulization 4 (Four) Times a Day As Needed for Wheezing., Disp: 1 each, Rfl: 3    albuterol sulfate  (90 Base) MCG/ACT inhaler, INHALE TWO PUFFS BY MOUTH EVERY 4 HOURS AS NEEDED FOR WHEEZING, Disp: 54 g, Rfl: 3    atorvastatin (LIPITOR) 40 MG tablet, Take 1 tablet by mouth Every Night., Disp: 90 tablet, Rfl: 3    Biotin 50688 MCG tablet, biotin  one daily 10,000mcg, Disp: , Rfl:     Breyna 160-4.5 MCG/ACT inhaler, INHALE TWO PUFFS BY MOUTH TWICE A DAY, Disp: 30.9 g, Rfl: 2    calcium carbonate (OS-AILYN) 600 MG tablet, Take 1 tablet by mouth Daily., Disp: , Rfl:     cetirizine (zyrTEC) 10 MG tablet, Take 1 tablet by mouth Daily., Disp: , Rfl:     chlorpheniramine (CHLOR-TRIMETON) 4 MG tablet, Take 1 tablet by mouth Every 6 (Six) Hours As Needed for Allergies., Disp: , Rfl:     clobetasol propionate (TEMOVATE) 0.05 % cream, , Disp: , Rfl:     ELDERBERRY PO, Take  by mouth., Disp: , Rfl:     guaiFENesin (MUCINEX) 600 MG 12 hr tablet, Take 1 tablet by mouth Every 12 (Twelve) Hours., Disp: 20 tablet, Rfl: 0    multivitamin with minerals (CENTRUM SILVER ADULT 50+ PO), Take 1 tablet by mouth Daily., Disp: , Rfl:     O2 (OXYGEN), oxygen  3 lpm, Disp: , Rfl:     sertraline (ZOLOFT) 25 MG tablet, Take 1 tablet by mouth Daily., Disp: , Rfl:     Spiriva HandiHaler 18 MCG per inhalation capsule, INHALE THE CONTENTS OF ONE CAPSULE DAILY, Disp: 90 capsule, Rfl: 3    vitamin D3 125 MCG (5000 UT) capsule capsule, Take 1 capsule by mouth Daily., Disp: , Rfl:   Past Medical History:   Diagnosis Date    Breast cancer     Cancer     Chronic bronchitis     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    Laceration of head 07/05/2019    Pneumonia     Syncope 07/05/2019        Social History     Socioeconomic History    Marital status:    Tobacco Use    Smoking status: Former     Current packs/day: 0.00     Average packs/day: 2.0 packs/day for 35.0 years (70.0 ttl pk-yrs)     Types: Cigarettes     Start date: 1980     Quit date: 2015     Years since quitting: 10.5     Passive exposure: Past    Smokeless tobacco: Never   Vaping Use    Vaping status: Never Used   Substance and Sexual Activity    Alcohol use: Yes     Alcohol/week: 5.0 standard drinks of alcohol     Types: 5 Cans of beer per week    Drug use: No    Sexual activity: Defer       Review of Systems   Constitutional: Negative for malaise/fatigue, weight gain and weight loss.   Cardiovascular:  Positive for dyspnea on exertion. Negative for chest pain, irregular heartbeat, leg swelling, near-syncope, orthopnea, palpitations, paroxysmal nocturnal dyspnea and syncope.   Respiratory:  Positive for shortness of breath. Negative for cough, sleep disturbances due to breathing, sputum production and wheezing.    Skin:  Negative for dry skin, flushing, itching and rash.   Gastrointestinal:  Negative for hematemesis and hematochezia.   Neurological:  Negative for dizziness, light-headedness, loss of balance and weakness.   All other systems reviewed and are negative.         Objective:     Vitals reviewed.   Constitutional:       General: Not in acute distress.     Appearance: Healthy appearance. Well-developed. Not diaphoretic.      Interventions: Nasal cannula in place.   Eyes:      General: No scleral icterus.     Conjunctiva/sclera: Conjunctivae normal.      Pupils: Pupils are equal, round, and reactive to light.   HENT:      Head: Normocephalic.    Mouth/Throat:      Pharynx: No oropharyngeal exudate.   Neck:      Vascular: No JVR.   Pulmonary:      Effort: Pulmonary effort is normal. No respiratory distress.      Breath sounds: Examination of the right-upper field reveals rales. Examination of the left-upper field reveals  "rales. Examination of the right-lower field reveals rales. Examination of the left-lower field reveals rales. No wheezing. No rhonchi. Rales present.   Chest:      Chest wall: Not tender to palpatation.   Cardiovascular:      Tachycardia present. Regular rhythm.   Pulses:     Intact distal pulses.   Edema:     Peripheral edema absent.   Abdominal:      General: Bowel sounds are normal. There is no distension.      Palpations: Abdomen is soft.      Tenderness: There is no abdominal tenderness.   Musculoskeletal: Normal range of motion.      Cervical back: Normal range of motion and neck supple. Skin:     General: Skin is warm and dry.      Coloration: Skin is not pale.      Findings: No erythema or rash.   Neurological:      Mental Status: Alert, oriented to person, place, and time and oriented to person, place and time.      Deep Tendon Reflexes: Reflexes are normal and symmetric.   Psychiatric:         Behavior: Behavior normal.             ECG 12 Lead    Date/Time: 7/30/2025 3:21 PM  Performed by: Alexa Martinez APRN    Authorized by: Alexa Martinez APRN  Comparison: compared with previous ECG from 7/24/2024  Similar to previous ECG  Comparison to previous ECG: Ventricular rate has increased by 21 bpm  Rhythm: sinus tachycardia  Rate: tachycardic  BPM: 107  Conduction: conduction normal  ST Segments: ST segments normal  T Waves: T waves normal  QRS axis: normal    Clinical impression: abnormal EKG        /64 (BP Location: Right arm, Patient Position: Sitting, Cuff Size: Adult)   Pulse 107   Ht 160 cm (63\")   Wt 75.3 kg (166 lb)   LMP  (LMP Unknown)   SpO2 96%   BMI 29.41 kg/m²     Lab Review:   I have reviewed previous office notes, recent labs and recent cardiac testing.     Results for orders placed during the hospital encounter of 02/06/23    Adult Transthoracic Echo Complete w/ Color, Spectral and Contrast if necessary per protocol 02/09/2023 10:10 AM    Interpretation Summary    Left " ventricular systolic function is normal. Estimated left ventricular EF = 55%    Left ventricular diastolic function is consistent with (grade I) impaired relaxation.    The right ventricular cavity is borderline dilated.    The right atrial cavity is mildly  dilated.    There is a trivial pericardial effusion.      Cath 8/10/2022:  Conclusion:  Normal coronary arteries  LVEF 40% with anteroapical dyskinesis consistent with Takotsubo's myocarditis           Assessment:          Diagnosis Plan   1. Takotsubo cardiomyopathy        2. Essential hypertension        3. Stage 4 very severe COPD by GOLD classification        4. Sinus tachycardia               Plan:       1. Takotsubo Cardiomyopathy- normal coronary arteries per cath in 8/2022 with EF improved to 55% from previous 40% at the time of diagnosis. Unable to tolerate GDMT due to issues with hypotension. Never had s/s volume overload.   2. HTN- controlled without use of antihypertensives.   3. COPD- stable. On 3L NC continuous. Follows with pulmonary.   4. Sinus tachycardia- asymptomatic. Likely secondary to pulmonary status. No medication adjustments needed at this time.       Follow up in 1 year or sooner if symptoms worsen.     I spent 30 minutes caring for Joana on this date of service. This time includes time spent by me in the following activities:preparing for the visit, reviewing tests, obtaining and/or reviewing a separately obtained history, performing a medically appropriate examination and/or evaluation , counseling and educating the patient/family/caregiver, documenting information in the medical record, independently interpreting results and communicating that information with the patient/family/caregiver and care coordination     I spent 2 minutes on the separately reported service of EKG interpretation. This time is not included in the time used to support the E/M service also reported today.

## (undated) DEVICE — CANN NASL ETCO2 LO/FLO A/

## (undated) DEVICE — PINNACLE INTRODUCER SHEATH: Brand: PINNACLE

## (undated) DEVICE — PK CATH CARD 30 CA/4

## (undated) DEVICE — SOLIDIFIER LIQUI LOC PLUS 2000CC

## (undated) DEVICE — PAD, DEFIB, ADULT, RADIOTRANS, PHYSIO: Brand: MEDLINE

## (undated) DEVICE — CATH DIAG IMPULSE M/ PK 145 5FR

## (undated) DEVICE — MODEL BT2000 P/N 700287-012KIT CONTENTS: MANIFOLD WITH SALINE AND CONTRAST PORTS, SALINE TUBING WITH SPIKE AND HAND SYRINGE, TRANSDUCER: Brand: BT2000 AUTOMATED MANIFOLD KIT

## (undated) DEVICE — MODEL AT P65, P/N 701554-001KIT CONTENTS: HAND CONTROLLER, 3-WAY HIGH-PRESSURE STOPCOCK WITH ROTATING END AND PREMIUM HIGH-PRESSURE TUBING: Brand: ANGIOTOUCH® KIT

## (undated) DEVICE — GW STARTER FXD CORE J .035 3X150CM 3MM

## (undated) DEVICE — SOL IRR NACL 0.9PCT BT 1000ML